# Patient Record
Sex: MALE | Race: WHITE | NOT HISPANIC OR LATINO | Employment: OTHER | URBAN - NONMETROPOLITAN AREA
[De-identification: names, ages, dates, MRNs, and addresses within clinical notes are randomized per-mention and may not be internally consistent; named-entity substitution may affect disease eponyms.]

---

## 2018-01-19 ENCOUNTER — HOSPITAL ENCOUNTER (INPATIENT)
Facility: HOSPITAL | Age: 53
LOS: 14 days | Discharge: HOME | End: 2018-02-02
Attending: PSYCHIATRY & NEUROLOGY | Admitting: PSYCHIATRY & NEUROLOGY

## 2018-01-19 ENCOUNTER — HOSPITAL ENCOUNTER (EMERGENCY)
Facility: HOSPITAL | Age: 53
Discharge: PSYCHIATRIC OR SUBSTANCE USE FACILITY | End: 2018-01-19
Attending: EMERGENCY MEDICINE | Admitting: EMERGENCY MEDICINE

## 2018-01-19 VITALS
WEIGHT: 200 LBS | OXYGEN SATURATION: 96 % | HEIGHT: 73 IN | TEMPERATURE: 98 F | SYSTOLIC BLOOD PRESSURE: 161 MMHG | RESPIRATION RATE: 18 BRPM | DIASTOLIC BLOOD PRESSURE: 87 MMHG | BODY MASS INDEX: 26.51 KG/M2 | HEART RATE: 97 BPM

## 2018-01-19 DIAGNOSIS — R45.851 SUICIDAL THOUGHTS: ICD-10-CM

## 2018-01-19 DIAGNOSIS — E86.0 DEHYDRATION: ICD-10-CM

## 2018-01-19 DIAGNOSIS — R44.3 HALLUCINATIONS: Primary | ICD-10-CM

## 2018-01-19 PROBLEM — F39 MOOD DISORDER (HCC): Status: ACTIVE | Noted: 2018-01-19

## 2018-01-19 PROBLEM — F29 PSYCHOTIC DISORDER (HCC): Status: ACTIVE | Noted: 2018-01-19

## 2018-01-19 PROBLEM — F02.80 DEMENTIA IN ALZHEIMER'S DISEASE WITH EARLY ONSET (HCC): Status: ACTIVE | Noted: 2018-01-19

## 2018-01-19 PROBLEM — G30.0 DEMENTIA IN ALZHEIMER'S DISEASE WITH EARLY ONSET (HCC): Status: ACTIVE | Noted: 2018-01-19

## 2018-01-19 LAB
ALBUMIN SERPL-MCNC: 4.1 G/DL (ref 3.4–4.8)
ALBUMIN/GLOB SERPL: 1.4 G/DL (ref 1.1–1.8)
ALP SERPL-CCNC: 85 U/L (ref 38–126)
ALT SERPL W P-5'-P-CCNC: 52 U/L (ref 21–72)
AMPHET+METHAMPHET UR QL: POSITIVE
ANION GAP SERPL CALCULATED.3IONS-SCNC: 11 MMOL/L (ref 5–15)
APAP SERPL-MCNC: <10 MCG/ML (ref 10–30)
AST SERPL-CCNC: 44 U/L (ref 17–59)
BARBITURATES UR QL SCN: NEGATIVE
BASOPHILS # BLD AUTO: 0 10*3/MM3 (ref 0–0.2)
BASOPHILS NFR BLD AUTO: 0 % (ref 0–2)
BENZODIAZ UR QL SCN: POSITIVE
BILIRUB SERPL-MCNC: 0.6 MG/DL (ref 0.2–1.3)
BILIRUB UR QL STRIP: ABNORMAL
BUN BLD-MCNC: 15 MG/DL (ref 7–21)
BUN/CREAT SERPL: 20.5 (ref 7–25)
CALCIUM SPEC-SCNC: 10.5 MG/DL (ref 8.4–10.2)
CANNABINOIDS SERPL QL: NEGATIVE
CHLORIDE SERPL-SCNC: 102 MMOL/L (ref 95–110)
CLARITY UR: CLEAR
CO2 SERPL-SCNC: 23 MMOL/L (ref 22–31)
COCAINE UR QL: NEGATIVE
COLOR UR: YELLOW
CREAT BLD-MCNC: 0.73 MG/DL (ref 0.7–1.3)
DEPRECATED RDW RBC AUTO: 39.7 FL (ref 35.1–43.9)
EOSINOPHIL # BLD AUTO: 0.03 10*3/MM3 (ref 0–0.7)
EOSINOPHIL NFR BLD AUTO: 0.4 % (ref 0–7)
ERYTHROCYTE [DISTWIDTH] IN BLOOD BY AUTOMATED COUNT: 12.6 % (ref 11.5–14.5)
ETHANOL BLD-MCNC: <10 MG/DL (ref 0–10)
ETHANOL UR QL: <0.01 %
GFR SERPL CREATININE-BSD FRML MDRD: 113 ML/MIN/1.73 (ref 56–130)
GLOBULIN UR ELPH-MCNC: 2.9 GM/DL (ref 2.3–3.5)
GLUCOSE BLD-MCNC: 104 MG/DL (ref 60–100)
GLUCOSE BLDC GLUCOMTR-MCNC: 100 MG/DL (ref 70–130)
GLUCOSE UR STRIP-MCNC: NEGATIVE MG/DL
HCT VFR BLD AUTO: 47.8 % (ref 39–49)
HGB BLD-MCNC: 17.3 G/DL (ref 13.7–17.3)
HGB UR QL STRIP.AUTO: NEGATIVE
HOLD SPECIMEN: NORMAL
HOLD SPECIMEN: NORMAL
IMM GRANULOCYTES # BLD: 0.02 10*3/MM3 (ref 0–0.02)
IMM GRANULOCYTES NFR BLD: 0.3 % (ref 0–0.5)
KETONES UR QL STRIP: ABNORMAL
LEUKOCYTE ESTERASE UR QL STRIP.AUTO: NEGATIVE
LYMPHOCYTES # BLD AUTO: 1.1 10*3/MM3 (ref 0.6–4.2)
LYMPHOCYTES NFR BLD AUTO: 13.9 % (ref 10–50)
MCH RBC QN AUTO: 32.1 PG (ref 26.5–34)
MCHC RBC AUTO-ENTMCNC: 36.2 G/DL (ref 31.5–36.3)
MCV RBC AUTO: 88.7 FL (ref 80–98)
METHADONE UR QL SCN: NEGATIVE
MONOCYTES # BLD AUTO: 0.45 10*3/MM3 (ref 0–0.9)
MONOCYTES NFR BLD AUTO: 5.7 % (ref 0–12)
NEUTROPHILS # BLD AUTO: 6.31 10*3/MM3 (ref 2–8.6)
NEUTROPHILS NFR BLD AUTO: 79.7 % (ref 37–80)
NITRITE UR QL STRIP: NEGATIVE
OPIATES UR QL: NEGATIVE
OXYCODONE UR QL SCN: NEGATIVE
PH UR STRIP.AUTO: 6 [PH] (ref 5–9)
PLATELET # BLD AUTO: 149 10*3/MM3 (ref 150–450)
PMV BLD AUTO: 10 FL (ref 8–12)
POTASSIUM BLD-SCNC: 3.3 MMOL/L (ref 3.5–5.1)
PROT SERPL-MCNC: 7 G/DL (ref 6.3–8.6)
PROT UR QL STRIP: ABNORMAL
RBC # BLD AUTO: 5.39 10*6/MM3 (ref 4.37–5.74)
SALICYLATES SERPL-MCNC: <1 MG/DL (ref 10–20)
SODIUM BLD-SCNC: 136 MMOL/L (ref 137–145)
SP GR UR STRIP: 1.02 (ref 1–1.03)
TSH SERPL DL<=0.05 MIU/L-ACNC: 0.66 MIU/ML (ref 0.46–4.68)
UROBILINOGEN UR QL STRIP: ABNORMAL
WBC NRBC COR # BLD: 7.91 10*3/MM3 (ref 3.2–9.8)
WHOLE BLOOD HOLD SPECIMEN: NORMAL
WHOLE BLOOD HOLD SPECIMEN: NORMAL

## 2018-01-19 PROCEDURE — 80053 COMPREHEN METABOLIC PANEL: CPT | Performed by: PHYSICIAN ASSISTANT

## 2018-01-19 PROCEDURE — 80307 DRUG TEST PRSMV CHEM ANLYZR: CPT | Performed by: PHYSICIAN ASSISTANT

## 2018-01-19 PROCEDURE — 96360 HYDRATION IV INFUSION INIT: CPT

## 2018-01-19 PROCEDURE — 81003 URINALYSIS AUTO W/O SCOPE: CPT | Performed by: PHYSICIAN ASSISTANT

## 2018-01-19 PROCEDURE — 93005 ELECTROCARDIOGRAM TRACING: CPT | Performed by: PHYSICIAN ASSISTANT

## 2018-01-19 PROCEDURE — 85025 COMPLETE CBC W/AUTO DIFF WBC: CPT | Performed by: PHYSICIAN ASSISTANT

## 2018-01-19 PROCEDURE — 82962 GLUCOSE BLOOD TEST: CPT

## 2018-01-19 PROCEDURE — 90791 PSYCH DIAGNOSTIC EVALUATION: CPT | Performed by: PSYCHIATRY & NEUROLOGY

## 2018-01-19 PROCEDURE — 84443 ASSAY THYROID STIM HORMONE: CPT | Performed by: PHYSICIAN ASSISTANT

## 2018-01-19 PROCEDURE — 93010 ELECTROCARDIOGRAM REPORT: CPT | Performed by: INTERNAL MEDICINE

## 2018-01-19 PROCEDURE — 99284 EMERGENCY DEPT VISIT MOD MDM: CPT

## 2018-01-19 RX ORDER — ATORVASTATIN CALCIUM 20 MG/1
20 TABLET, FILM COATED ORAL NIGHTLY
Status: ON HOLD | COMMUNITY
End: 2018-03-08

## 2018-01-19 RX ORDER — OLANZAPINE 5 MG/1
5 TABLET, ORALLY DISINTEGRATING ORAL 2 TIMES DAILY PRN
Status: DISCONTINUED | OUTPATIENT
Start: 2018-01-19 | End: 2018-01-29

## 2018-01-19 RX ORDER — MECLIZINE HCL 12.5 MG/1
12.5 TABLET ORAL 2 TIMES DAILY PRN
COMMUNITY
End: 2018-03-08 | Stop reason: HOSPADM

## 2018-01-19 RX ORDER — LOSARTAN POTASSIUM AND HYDROCHLOROTHIAZIDE 25; 100 MG/1; MG/1
1 TABLET ORAL DAILY
COMMUNITY
End: 2018-02-02 | Stop reason: HOSPADM

## 2018-01-19 RX ORDER — LORAZEPAM 1 MG/1
1 TABLET ORAL 3 TIMES DAILY
Status: COMPLETED | OUTPATIENT
Start: 2018-01-19 | End: 2018-01-20

## 2018-01-19 RX ORDER — HYDROXYZINE PAMOATE 50 MG/1
50 CAPSULE ORAL EVERY 6 HOURS PRN
Status: DISCONTINUED | OUTPATIENT
Start: 2018-01-19 | End: 2018-01-22

## 2018-01-19 RX ORDER — DONEPEZIL HYDROCHLORIDE 10 MG/1
10 TABLET, FILM COATED ORAL NIGHTLY
Status: ON HOLD | COMMUNITY
End: 2018-03-08

## 2018-01-19 RX ORDER — ACETAMINOPHEN 325 MG/1
650 TABLET ORAL EVERY 4 HOURS PRN
Status: DISCONTINUED | OUTPATIENT
Start: 2018-01-19 | End: 2018-02-02 | Stop reason: HOSPADM

## 2018-01-19 RX ORDER — ASPIRIN 81 MG/1
81 TABLET, CHEWABLE ORAL DAILY
Status: DISCONTINUED | OUTPATIENT
Start: 2018-01-19 | End: 2018-02-02 | Stop reason: HOSPADM

## 2018-01-19 RX ORDER — ASPIRIN 81 MG/1
81 TABLET, CHEWABLE ORAL DAILY
Status: ON HOLD | COMMUNITY
End: 2018-03-08

## 2018-01-19 RX ORDER — RISPERIDONE 1 MG/1
3 TABLET ORAL NIGHTLY
Status: DISCONTINUED | OUTPATIENT
Start: 2018-01-19 | End: 2018-01-20

## 2018-01-19 RX ORDER — LORAZEPAM 0.5 MG/1
0.5 TABLET ORAL 3 TIMES DAILY
Status: COMPLETED | OUTPATIENT
Start: 2018-01-20 | End: 2018-01-21

## 2018-01-19 RX ORDER — CLOPIDOGREL BISULFATE 75 MG/1
75 TABLET ORAL DAILY
Status: DISCONTINUED | OUTPATIENT
Start: 2018-01-19 | End: 2018-02-02 | Stop reason: HOSPADM

## 2018-01-19 RX ORDER — LORAZEPAM 0.5 MG/1
0.5 TABLET ORAL DAILY
Status: COMPLETED | OUTPATIENT
Start: 2018-01-23 | End: 2018-01-23

## 2018-01-19 RX ORDER — METOPROLOL SUCCINATE 50 MG/1
50 TABLET, EXTENDED RELEASE ORAL DAILY
Status: DISCONTINUED | OUTPATIENT
Start: 2018-01-19 | End: 2018-02-02 | Stop reason: HOSPADM

## 2018-01-19 RX ORDER — ALPRAZOLAM 2 MG/1
2 TABLET ORAL 3 TIMES DAILY PRN
Status: ON HOLD | COMMUNITY
End: 2018-01-26

## 2018-01-19 RX ORDER — CLOPIDOGREL BISULFATE 75 MG/1
75 TABLET ORAL DAILY
Status: ON HOLD | COMMUNITY
End: 2018-03-08

## 2018-01-19 RX ORDER — PREDNISONE 20 MG/1
20 TABLET ORAL DAILY
Status: ON HOLD | COMMUNITY
End: 2018-01-26

## 2018-01-19 RX ORDER — POTASSIUM CHLORIDE 1.5 G/1.77G
20 POWDER, FOR SOLUTION ORAL 2 TIMES DAILY WITH MEALS
COMMUNITY
End: 2018-02-02 | Stop reason: HOSPADM

## 2018-01-19 RX ORDER — TRAZODONE HYDROCHLORIDE 50 MG/1
50 TABLET ORAL NIGHTLY PRN
Status: DISCONTINUED | OUTPATIENT
Start: 2018-01-19 | End: 2018-01-20

## 2018-01-19 RX ORDER — TERAZOSIN 2 MG/1
2 CAPSULE ORAL NIGHTLY
Status: DISCONTINUED | OUTPATIENT
Start: 2018-01-19 | End: 2018-02-02 | Stop reason: HOSPADM

## 2018-01-19 RX ORDER — HYDROCHLOROTHIAZIDE 25 MG/1
25 TABLET ORAL DAILY
Status: DISCONTINUED | OUTPATIENT
Start: 2018-01-19 | End: 2018-02-02 | Stop reason: HOSPADM

## 2018-01-19 RX ORDER — PANTOPRAZOLE SODIUM 40 MG/1
40 TABLET, DELAYED RELEASE ORAL DAILY
Status: DISCONTINUED | OUTPATIENT
Start: 2018-01-19 | End: 2018-02-02 | Stop reason: HOSPADM

## 2018-01-19 RX ORDER — ONDANSETRON 4 MG/1
4 TABLET, FILM COATED ORAL EVERY 6 HOURS PRN
Status: DISCONTINUED | OUTPATIENT
Start: 2018-01-19 | End: 2018-02-02 | Stop reason: HOSPADM

## 2018-01-19 RX ORDER — LORAZEPAM 0.5 MG/1
0.5 TABLET ORAL 2 TIMES DAILY
Status: COMPLETED | OUTPATIENT
Start: 2018-01-21 | End: 2018-01-22

## 2018-01-19 RX ORDER — POTASSIUM CHLORIDE 1.5 G/1.77G
20 POWDER, FOR SOLUTION ORAL 2 TIMES DAILY WITH MEALS
Status: DISCONTINUED | OUTPATIENT
Start: 2018-01-19 | End: 2018-01-20

## 2018-01-19 RX ORDER — HYDROCODONE BITARTRATE AND ACETAMINOPHEN 7.5; 325 MG/1; MG/1
1 TABLET ORAL 3 TIMES DAILY PRN
Status: ON HOLD | COMMUNITY
End: 2018-01-19

## 2018-01-19 RX ORDER — ESCITALOPRAM OXALATE 10 MG/1
20 TABLET ORAL DAILY
Status: DISCONTINUED | OUTPATIENT
Start: 2018-01-19 | End: 2018-01-24

## 2018-01-19 RX ORDER — DOXAZOSIN 2 MG/1
2 TABLET ORAL NIGHTLY
COMMUNITY
End: 2018-03-08 | Stop reason: HOSPADM

## 2018-01-19 RX ORDER — DONEPEZIL HYDROCHLORIDE 10 MG/1
10 TABLET, FILM COATED ORAL NIGHTLY
Status: DISCONTINUED | OUTPATIENT
Start: 2018-01-19 | End: 2018-02-02 | Stop reason: HOSPADM

## 2018-01-19 RX ORDER — SODIUM CHLORIDE 0.9 % (FLUSH) 0.9 %
10 SYRINGE (ML) INJECTION AS NEEDED
Status: DISCONTINUED | OUTPATIENT
Start: 2018-01-19 | End: 2018-01-19 | Stop reason: HOSPADM

## 2018-01-19 RX ORDER — TEMAZEPAM 30 MG/1
30 CAPSULE ORAL NIGHTLY
Status: ON HOLD | COMMUNITY
End: 2018-01-26

## 2018-01-19 RX ORDER — MECLIZINE HCL 12.5 MG/1
12.5 TABLET ORAL 2 TIMES DAILY PRN
Status: DISCONTINUED | OUTPATIENT
Start: 2018-01-19 | End: 2018-02-02 | Stop reason: HOSPADM

## 2018-01-19 RX ORDER — METOPROLOL SUCCINATE 50 MG/1
50 TABLET, EXTENDED RELEASE ORAL DAILY
Status: ON HOLD | COMMUNITY
End: 2018-03-08

## 2018-01-19 RX ORDER — CLONIDINE HYDROCHLORIDE 0.1 MG/1
0.1 TABLET ORAL EVERY 4 HOURS PRN
Status: DISCONTINUED | OUTPATIENT
Start: 2018-01-19 | End: 2018-02-02 | Stop reason: HOSPADM

## 2018-01-19 RX ORDER — PANTOPRAZOLE SODIUM 40 MG/1
40 TABLET, DELAYED RELEASE ORAL DAILY
Status: ON HOLD | COMMUNITY
End: 2018-03-08

## 2018-01-19 RX ORDER — CITALOPRAM 40 MG/1
40 TABLET ORAL DAILY
Status: ON HOLD | COMMUNITY
End: 2018-01-26

## 2018-01-19 RX ORDER — LOPERAMIDE HYDROCHLORIDE 2 MG/1
2 CAPSULE ORAL 4 TIMES DAILY PRN
Status: DISCONTINUED | OUTPATIENT
Start: 2018-01-19 | End: 2018-02-02 | Stop reason: HOSPADM

## 2018-01-19 RX ORDER — LOSARTAN POTASSIUM 50 MG/1
100 TABLET ORAL
Status: DISCONTINUED | OUTPATIENT
Start: 2018-01-19 | End: 2018-01-22

## 2018-01-19 RX ORDER — ATORVASTATIN CALCIUM 20 MG/1
20 TABLET, FILM COATED ORAL NIGHTLY
Status: DISCONTINUED | OUTPATIENT
Start: 2018-01-19 | End: 2018-02-02 | Stop reason: HOSPADM

## 2018-01-19 RX ORDER — DOCUSATE SODIUM 100 MG/1
100 CAPSULE, LIQUID FILLED ORAL 2 TIMES DAILY PRN
Status: DISCONTINUED | OUTPATIENT
Start: 2018-01-19 | End: 2018-02-02 | Stop reason: HOSPADM

## 2018-01-19 RX ADMIN — RISPERIDONE 3 MG: 1 TABLET ORAL at 20:53

## 2018-01-19 RX ADMIN — ATORVASTATIN CALCIUM 20 MG: 20 TABLET, FILM COATED ORAL at 20:53

## 2018-01-19 RX ADMIN — CLOPIDOGREL BISULFATE 75 MG: 75 TABLET ORAL at 17:21

## 2018-01-19 RX ADMIN — POTASSIUM CHLORIDE 20 MEQ: 1.5 POWDER, FOR SOLUTION ORAL at 17:39

## 2018-01-19 RX ADMIN — DONEPEZIL HYDROCHLORIDE 10 MG: 10 TABLET, FILM COATED ORAL at 20:53

## 2018-01-19 RX ADMIN — PANTOPRAZOLE SODIUM 40 MG: 40 TABLET, DELAYED RELEASE ORAL at 17:39

## 2018-01-19 RX ADMIN — ASPIRIN 81 MG 81 MG: 81 TABLET ORAL at 17:21

## 2018-01-19 RX ADMIN — Medication 10 ML: at 12:02

## 2018-01-19 RX ADMIN — SODIUM CHLORIDE 1000 ML: 900 INJECTION, SOLUTION INTRAVENOUS at 12:04

## 2018-01-19 RX ADMIN — LORAZEPAM 1 MG: 1 TABLET ORAL at 20:53

## 2018-01-19 RX ADMIN — METOPROLOL SUCCINATE 50 MG: 50 TABLET, EXTENDED RELEASE ORAL at 17:40

## 2018-01-20 PROCEDURE — 99232 SBSQ HOSP IP/OBS MODERATE 35: CPT | Performed by: PSYCHIATRY & NEUROLOGY

## 2018-01-20 PROCEDURE — 99232 SBSQ HOSP IP/OBS MODERATE 35: CPT | Performed by: FAMILY MEDICINE

## 2018-01-20 RX ORDER — QUETIAPINE FUMARATE 300 MG/1
300 TABLET, FILM COATED ORAL NIGHTLY
Status: DISCONTINUED | OUTPATIENT
Start: 2018-01-20 | End: 2018-01-21

## 2018-01-20 RX ORDER — RISPERIDONE 1 MG/1
3 TABLET ORAL ONCE
Status: COMPLETED | OUTPATIENT
Start: 2018-01-20 | End: 2018-01-20

## 2018-01-20 RX ORDER — POTASSIUM CHLORIDE 750 MG/1
30 CAPSULE, EXTENDED RELEASE ORAL
Status: COMPLETED | OUTPATIENT
Start: 2018-01-20 | End: 2018-01-22

## 2018-01-20 RX ADMIN — ONDANSETRON 4 MG: 4 TABLET, FILM COATED ORAL at 08:28

## 2018-01-20 RX ADMIN — ATORVASTATIN CALCIUM 20 MG: 20 TABLET, FILM COATED ORAL at 20:21

## 2018-01-20 RX ADMIN — POTASSIUM CHLORIDE 30 MEQ: 750 CAPSULE, EXTENDED RELEASE ORAL at 18:11

## 2018-01-20 RX ADMIN — ESCITALOPRAM OXALATE 20 MG: 10 TABLET ORAL at 09:19

## 2018-01-20 RX ADMIN — RISPERIDONE 3 MG: 1 TABLET ORAL at 16:41

## 2018-01-20 RX ADMIN — ASPIRIN 81 MG 81 MG: 81 TABLET ORAL at 09:19

## 2018-01-20 RX ADMIN — PANTOPRAZOLE SODIUM 40 MG: 40 TABLET, DELAYED RELEASE ORAL at 09:20

## 2018-01-20 RX ADMIN — TERAZOSIN HYDROCHLORIDE 2 MG: 1 CAPSULE ORAL at 20:21

## 2018-01-20 RX ADMIN — LOSARTAN POTASSIUM 100 MG: 50 TABLET, FILM COATED ORAL at 09:19

## 2018-01-20 RX ADMIN — CLOPIDOGREL BISULFATE 75 MG: 75 TABLET ORAL at 09:19

## 2018-01-20 RX ADMIN — POTASSIUM CHLORIDE 30 MEQ: 750 CAPSULE, EXTENDED RELEASE ORAL at 09:00

## 2018-01-20 RX ADMIN — LORAZEPAM 1 MG: 1 TABLET ORAL at 16:38

## 2018-01-20 RX ADMIN — QUETIAPINE FUMARATE 300 MG: 300 TABLET ORAL at 20:21

## 2018-01-20 RX ADMIN — POTASSIUM CHLORIDE 30 MEQ: 750 CAPSULE, EXTENDED RELEASE ORAL at 12:43

## 2018-01-20 RX ADMIN — METOPROLOL SUCCINATE 50 MG: 50 TABLET, EXTENDED RELEASE ORAL at 09:18

## 2018-01-20 RX ADMIN — LORAZEPAM 0.5 MG: 0.5 TABLET ORAL at 20:23

## 2018-01-20 RX ADMIN — LORAZEPAM 1 MG: 1 TABLET ORAL at 09:20

## 2018-01-20 RX ADMIN — DONEPEZIL HYDROCHLORIDE 10 MG: 10 TABLET, FILM COATED ORAL at 20:21

## 2018-01-20 RX ADMIN — ONDANSETRON 4 MG: 4 TABLET, FILM COATED ORAL at 15:12

## 2018-01-20 RX ADMIN — HYDROCHLOROTHIAZIDE 25 MG: 25 TABLET ORAL at 09:19

## 2018-01-21 PROCEDURE — 99232 SBSQ HOSP IP/OBS MODERATE 35: CPT | Performed by: PSYCHIATRY & NEUROLOGY

## 2018-01-21 PROCEDURE — 99232 SBSQ HOSP IP/OBS MODERATE 35: CPT | Performed by: FAMILY MEDICINE

## 2018-01-21 RX ORDER — QUETIAPINE FUMARATE 300 MG/1
600 TABLET, FILM COATED ORAL NIGHTLY
Status: DISCONTINUED | OUTPATIENT
Start: 2018-01-21 | End: 2018-01-22

## 2018-01-21 RX ADMIN — CLOPIDOGREL BISULFATE 75 MG: 75 TABLET ORAL at 08:17

## 2018-01-21 RX ADMIN — QUETIAPINE FUMARATE 600 MG: 300 TABLET ORAL at 22:18

## 2018-01-21 RX ADMIN — ATORVASTATIN CALCIUM 20 MG: 20 TABLET, FILM COATED ORAL at 22:17

## 2018-01-21 RX ADMIN — POTASSIUM CHLORIDE 30 MEQ: 750 CAPSULE, EXTENDED RELEASE ORAL at 08:17

## 2018-01-21 RX ADMIN — ESCITALOPRAM OXALATE 20 MG: 10 TABLET ORAL at 08:17

## 2018-01-21 RX ADMIN — LORAZEPAM 0.5 MG: 0.5 TABLET ORAL at 08:17

## 2018-01-21 RX ADMIN — POTASSIUM CHLORIDE 30 MEQ: 750 CAPSULE, EXTENDED RELEASE ORAL at 11:34

## 2018-01-21 RX ADMIN — DONEPEZIL HYDROCHLORIDE 10 MG: 10 TABLET, FILM COATED ORAL at 22:18

## 2018-01-21 RX ADMIN — POTASSIUM CHLORIDE 30 MEQ: 750 CAPSULE, EXTENDED RELEASE ORAL at 17:07

## 2018-01-21 RX ADMIN — ASPIRIN 81 MG 81 MG: 81 TABLET ORAL at 08:17

## 2018-01-21 RX ADMIN — PANTOPRAZOLE SODIUM 40 MG: 40 TABLET, DELAYED RELEASE ORAL at 08:17

## 2018-01-21 RX ADMIN — LORAZEPAM 0.5 MG: 0.5 TABLET ORAL at 16:44

## 2018-01-21 RX ADMIN — LORAZEPAM 0.5 MG: 0.5 TABLET ORAL at 22:18

## 2018-01-21 RX ADMIN — TERAZOSIN HYDROCHLORIDE 2 MG: 1 CAPSULE ORAL at 22:18

## 2018-01-22 PROCEDURE — 99232 SBSQ HOSP IP/OBS MODERATE 35: CPT | Performed by: PSYCHIATRY & NEUROLOGY

## 2018-01-22 RX ORDER — DIAPER,BRIEF,INFANT-TODD,DISP
EACH MISCELLANEOUS 4 TIMES DAILY PRN
Status: DISCONTINUED | OUTPATIENT
Start: 2018-01-22 | End: 2018-02-02 | Stop reason: HOSPADM

## 2018-01-22 RX ORDER — LURASIDONE HYDROCHLORIDE 40 MG/1
60 TABLET, FILM COATED ORAL
Status: DISCONTINUED | OUTPATIENT
Start: 2018-01-22 | End: 2018-01-23

## 2018-01-22 RX ORDER — TRAZODONE HYDROCHLORIDE 100 MG/1
100 TABLET ORAL NIGHTLY PRN
Status: DISCONTINUED | OUTPATIENT
Start: 2018-01-22 | End: 2018-01-27

## 2018-01-22 RX ORDER — HYDROXYZINE PAMOATE 25 MG/1
25 CAPSULE ORAL 4 TIMES DAILY PRN
Status: DISCONTINUED | OUTPATIENT
Start: 2018-01-22 | End: 2018-01-30

## 2018-01-22 RX ADMIN — METOPROLOL SUCCINATE 50 MG: 50 TABLET, EXTENDED RELEASE ORAL at 08:12

## 2018-01-22 RX ADMIN — TERAZOSIN HYDROCHLORIDE 2 MG: 1 CAPSULE ORAL at 21:02

## 2018-01-22 RX ADMIN — POTASSIUM CHLORIDE 30 MEQ: 750 CAPSULE, EXTENDED RELEASE ORAL at 08:11

## 2018-01-22 RX ADMIN — LOSARTAN POTASSIUM 100 MG: 50 TABLET, FILM COATED ORAL at 08:11

## 2018-01-22 RX ADMIN — CLOPIDOGREL BISULFATE 75 MG: 75 TABLET ORAL at 08:12

## 2018-01-22 RX ADMIN — HYDROCORTISONE: 1 CREAM TOPICAL at 11:36

## 2018-01-22 RX ADMIN — POTASSIUM CHLORIDE 30 MEQ: 750 CAPSULE, EXTENDED RELEASE ORAL at 11:48

## 2018-01-22 RX ADMIN — TRAZODONE HYDROCHLORIDE 100 MG: 100 TABLET ORAL at 21:06

## 2018-01-22 RX ADMIN — ASPIRIN 81 MG 81 MG: 81 TABLET ORAL at 08:11

## 2018-01-22 RX ADMIN — ATORVASTATIN CALCIUM 20 MG: 20 TABLET, FILM COATED ORAL at 21:02

## 2018-01-22 RX ADMIN — POTASSIUM CHLORIDE 30 MEQ: 750 CAPSULE, EXTENDED RELEASE ORAL at 18:32

## 2018-01-22 RX ADMIN — HYDROXYZINE PAMOATE 25 MG: 25 CAPSULE ORAL at 18:32

## 2018-01-22 RX ADMIN — HYDROXYZINE PAMOATE 25 MG: 25 CAPSULE ORAL at 11:36

## 2018-01-22 RX ADMIN — HYDROCHLOROTHIAZIDE 25 MG: 25 TABLET ORAL at 08:12

## 2018-01-22 RX ADMIN — ESCITALOPRAM OXALATE 20 MG: 10 TABLET ORAL at 08:11

## 2018-01-22 RX ADMIN — LORAZEPAM 0.5 MG: 0.5 TABLET ORAL at 08:11

## 2018-01-22 RX ADMIN — LURASIDONE HYDROCHLORIDE 60 MG: 40 TABLET, FILM COATED ORAL at 18:31

## 2018-01-22 RX ADMIN — DONEPEZIL HYDROCHLORIDE 10 MG: 10 TABLET, FILM COATED ORAL at 21:02

## 2018-01-22 RX ADMIN — PANTOPRAZOLE SODIUM 40 MG: 40 TABLET, DELAYED RELEASE ORAL at 08:12

## 2018-01-23 PROCEDURE — G0480 DRUG TEST DEF 1-7 CLASSES: HCPCS | Performed by: PSYCHIATRY & NEUROLOGY

## 2018-01-23 PROCEDURE — 99232 SBSQ HOSP IP/OBS MODERATE 35: CPT | Performed by: PSYCHIATRY & NEUROLOGY

## 2018-01-23 RX ORDER — LURASIDONE HYDROCHLORIDE 40 MG/1
80 TABLET, FILM COATED ORAL
Status: DISCONTINUED | OUTPATIENT
Start: 2018-01-23 | End: 2018-01-24

## 2018-01-23 RX ADMIN — PANTOPRAZOLE SODIUM 40 MG: 40 TABLET, DELAYED RELEASE ORAL at 08:30

## 2018-01-23 RX ADMIN — TERAZOSIN HYDROCHLORIDE 2 MG: 1 CAPSULE ORAL at 20:58

## 2018-01-23 RX ADMIN — LURASIDONE HYDROCHLORIDE 80 MG: 40 TABLET, FILM COATED ORAL at 17:14

## 2018-01-23 RX ADMIN — DONEPEZIL HYDROCHLORIDE 10 MG: 10 TABLET, FILM COATED ORAL at 20:58

## 2018-01-23 RX ADMIN — HYDROCHLOROTHIAZIDE 25 MG: 25 TABLET ORAL at 08:30

## 2018-01-23 RX ADMIN — HYDROCORTISONE: 1 CREAM TOPICAL at 08:30

## 2018-01-23 RX ADMIN — ATORVASTATIN CALCIUM 20 MG: 20 TABLET, FILM COATED ORAL at 20:58

## 2018-01-23 RX ADMIN — LORAZEPAM 0.5 MG: 0.5 TABLET ORAL at 10:04

## 2018-01-23 RX ADMIN — TRAZODONE HYDROCHLORIDE 100 MG: 100 TABLET ORAL at 20:58

## 2018-01-23 RX ADMIN — ASPIRIN 81 MG 81 MG: 81 TABLET ORAL at 08:30

## 2018-01-23 RX ADMIN — METOPROLOL SUCCINATE 50 MG: 50 TABLET, EXTENDED RELEASE ORAL at 08:30

## 2018-01-23 RX ADMIN — CLOPIDOGREL BISULFATE 75 MG: 75 TABLET ORAL at 08:30

## 2018-01-23 RX ADMIN — ESCITALOPRAM OXALATE 20 MG: 10 TABLET ORAL at 08:30

## 2018-01-24 LAB
ARTICHOKE IGE QN: 62 MG/DL (ref 1–129)
CHOLEST SERPL-MCNC: 121 MG/DL (ref 0–199)
GLUCOSE P FAST SERPL-MCNC: 101 MG/DL (ref 60–110)
HDLC SERPL-MCNC: 42 MG/DL (ref 60–200)
LDLC/HDLC SERPL: 1.36 {RATIO} (ref 0–3.55)
TRIGL SERPL-MCNC: 109 MG/DL (ref 20–199)
WHOLE BLOOD HOLD SPECIMEN: NORMAL

## 2018-01-24 PROCEDURE — 80061 LIPID PANEL: CPT | Performed by: PSYCHIATRY & NEUROLOGY

## 2018-01-24 PROCEDURE — 99232 SBSQ HOSP IP/OBS MODERATE 35: CPT | Performed by: PSYCHIATRY & NEUROLOGY

## 2018-01-24 PROCEDURE — 82947 ASSAY GLUCOSE BLOOD QUANT: CPT | Performed by: PSYCHIATRY & NEUROLOGY

## 2018-01-24 RX ORDER — DULOXETIN HYDROCHLORIDE 60 MG/1
60 CAPSULE, DELAYED RELEASE ORAL DAILY
Status: DISCONTINUED | OUTPATIENT
Start: 2018-01-25 | End: 2018-02-02 | Stop reason: HOSPADM

## 2018-01-24 RX ORDER — DULOXETIN HYDROCHLORIDE 30 MG/1
30 CAPSULE, DELAYED RELEASE ORAL DAILY
Status: COMPLETED | OUTPATIENT
Start: 2018-01-24 | End: 2018-01-24

## 2018-01-24 RX ORDER — ESCITALOPRAM OXALATE 10 MG/1
10 TABLET ORAL DAILY
Status: COMPLETED | OUTPATIENT
Start: 2018-01-25 | End: 2018-01-25

## 2018-01-24 RX ORDER — LURASIDONE HYDROCHLORIDE 40 MG/1
120 TABLET, FILM COATED ORAL
Status: DISCONTINUED | OUTPATIENT
Start: 2018-01-24 | End: 2018-01-25

## 2018-01-24 RX ADMIN — ASPIRIN 81 MG 81 MG: 81 TABLET ORAL at 08:15

## 2018-01-24 RX ADMIN — TRAZODONE HYDROCHLORIDE 100 MG: 100 TABLET ORAL at 20:00

## 2018-01-24 RX ADMIN — PANTOPRAZOLE SODIUM 40 MG: 40 TABLET, DELAYED RELEASE ORAL at 08:14

## 2018-01-24 RX ADMIN — HYDROCHLOROTHIAZIDE 25 MG: 25 TABLET ORAL at 08:14

## 2018-01-24 RX ADMIN — CLOPIDOGREL BISULFATE 75 MG: 75 TABLET ORAL at 08:14

## 2018-01-24 RX ADMIN — DULOXETINE HYDROCHLORIDE 30 MG: 30 CAPSULE, DELAYED RELEASE ORAL at 15:06

## 2018-01-24 RX ADMIN — LURASIDONE HYDROCHLORIDE 120 MG: 40 TABLET, FILM COATED ORAL at 17:27

## 2018-01-24 RX ADMIN — TERAZOSIN HYDROCHLORIDE 2 MG: 1 CAPSULE ORAL at 20:00

## 2018-01-24 RX ADMIN — DONEPEZIL HYDROCHLORIDE 10 MG: 10 TABLET, FILM COATED ORAL at 20:00

## 2018-01-24 RX ADMIN — METOPROLOL SUCCINATE 50 MG: 50 TABLET, EXTENDED RELEASE ORAL at 08:15

## 2018-01-24 RX ADMIN — ESCITALOPRAM OXALATE 20 MG: 10 TABLET ORAL at 08:14

## 2018-01-24 RX ADMIN — ATORVASTATIN CALCIUM 20 MG: 20 TABLET, FILM COATED ORAL at 20:00

## 2018-01-25 PROCEDURE — 99232 SBSQ HOSP IP/OBS MODERATE 35: CPT | Performed by: PSYCHIATRY & NEUROLOGY

## 2018-01-25 RX ORDER — RISPERIDONE 1 MG/1
2 TABLET ORAL EVERY 12 HOURS SCHEDULED
Status: DISCONTINUED | OUTPATIENT
Start: 2018-01-25 | End: 2018-01-26

## 2018-01-25 RX ORDER — LITHIUM CARBONATE 300 MG
300 TABLET ORAL 2 TIMES DAILY WITH MEALS
Status: DISCONTINUED | OUTPATIENT
Start: 2018-01-25 | End: 2018-01-27

## 2018-01-25 RX ADMIN — HYDROXYZINE PAMOATE 25 MG: 25 CAPSULE ORAL at 17:27

## 2018-01-25 RX ADMIN — ATORVASTATIN CALCIUM 20 MG: 20 TABLET, FILM COATED ORAL at 21:25

## 2018-01-25 RX ADMIN — ASPIRIN 81 MG 81 MG: 81 TABLET ORAL at 08:33

## 2018-01-25 RX ADMIN — DONEPEZIL HYDROCHLORIDE 10 MG: 10 TABLET, FILM COATED ORAL at 21:25

## 2018-01-25 RX ADMIN — RISPERIDONE 2 MG: 1 TABLET ORAL at 21:25

## 2018-01-25 RX ADMIN — LITHIUM CARBONATE 300 MG: 300 TABLET ORAL at 21:25

## 2018-01-25 RX ADMIN — PANTOPRAZOLE SODIUM 40 MG: 40 TABLET, DELAYED RELEASE ORAL at 08:34

## 2018-01-25 RX ADMIN — METOPROLOL SUCCINATE 50 MG: 50 TABLET, EXTENDED RELEASE ORAL at 08:33

## 2018-01-25 RX ADMIN — TERAZOSIN HYDROCHLORIDE 2 MG: 1 CAPSULE ORAL at 21:25

## 2018-01-25 RX ADMIN — CLOPIDOGREL BISULFATE 75 MG: 75 TABLET ORAL at 08:34

## 2018-01-25 RX ADMIN — ESCITALOPRAM OXALATE 10 MG: 10 TABLET ORAL at 08:34

## 2018-01-25 RX ADMIN — RISPERIDONE 2 MG: 1 TABLET ORAL at 14:47

## 2018-01-25 RX ADMIN — TRAZODONE HYDROCHLORIDE 100 MG: 100 TABLET ORAL at 21:25

## 2018-01-25 RX ADMIN — HYDROXYZINE PAMOATE 25 MG: 25 CAPSULE ORAL at 09:43

## 2018-01-25 RX ADMIN — DULOXETINE 60 MG: 60 CAPSULE, DELAYED RELEASE ORAL at 08:34

## 2018-01-25 RX ADMIN — HYDROCHLOROTHIAZIDE 25 MG: 25 TABLET ORAL at 08:34

## 2018-01-26 PROCEDURE — 99232 SBSQ HOSP IP/OBS MODERATE 35: CPT | Performed by: PSYCHIATRY & NEUROLOGY

## 2018-01-26 RX ORDER — RISPERIDONE 1 MG/1
4 TABLET ORAL NIGHTLY
Status: DISCONTINUED | OUTPATIENT
Start: 2018-01-26 | End: 2018-01-29

## 2018-01-26 RX ORDER — GUAIFENESIN 600 MG/1
600 TABLET, EXTENDED RELEASE ORAL EVERY 12 HOURS SCHEDULED
Status: DISCONTINUED | OUTPATIENT
Start: 2018-01-26 | End: 2018-01-29

## 2018-01-26 RX ADMIN — TRAZODONE HYDROCHLORIDE 100 MG: 100 TABLET ORAL at 20:38

## 2018-01-26 RX ADMIN — HYDROCORTISONE: 1 CREAM TOPICAL at 08:23

## 2018-01-26 RX ADMIN — LITHIUM CARBONATE 300 MG: 300 TABLET ORAL at 08:23

## 2018-01-26 RX ADMIN — PANTOPRAZOLE SODIUM 40 MG: 40 TABLET, DELAYED RELEASE ORAL at 08:22

## 2018-01-26 RX ADMIN — GUAIFENESIN 600 MG: 600 TABLET, EXTENDED RELEASE ORAL at 20:38

## 2018-01-26 RX ADMIN — CLOPIDOGREL BISULFATE 75 MG: 75 TABLET ORAL at 08:22

## 2018-01-26 RX ADMIN — RISPERIDONE 2 MG: 1 TABLET ORAL at 08:22

## 2018-01-26 RX ADMIN — ATORVASTATIN CALCIUM 20 MG: 20 TABLET, FILM COATED ORAL at 20:38

## 2018-01-26 RX ADMIN — RISPERIDONE 4 MG: 1 TABLET ORAL at 20:38

## 2018-01-26 RX ADMIN — GUAIFENESIN 600 MG: 600 TABLET, EXTENDED RELEASE ORAL at 16:14

## 2018-01-26 RX ADMIN — METOPROLOL SUCCINATE 50 MG: 50 TABLET, EXTENDED RELEASE ORAL at 08:22

## 2018-01-26 RX ADMIN — HYDROCHLOROTHIAZIDE 25 MG: 25 TABLET ORAL at 08:22

## 2018-01-26 RX ADMIN — DULOXETINE 60 MG: 60 CAPSULE, DELAYED RELEASE ORAL at 08:22

## 2018-01-26 RX ADMIN — ASPIRIN 81 MG 81 MG: 81 TABLET ORAL at 08:22

## 2018-01-26 RX ADMIN — LITHIUM CARBONATE 300 MG: 300 TABLET ORAL at 17:35

## 2018-01-26 RX ADMIN — OLANZAPINE 5 MG: 5 TABLET, ORALLY DISINTEGRATING ORAL at 22:29

## 2018-01-26 RX ADMIN — DONEPEZIL HYDROCHLORIDE 10 MG: 10 TABLET, FILM COATED ORAL at 20:37

## 2018-01-26 RX ADMIN — TERAZOSIN HYDROCHLORIDE 2 MG: 1 CAPSULE ORAL at 20:38

## 2018-01-27 PROCEDURE — 99232 SBSQ HOSP IP/OBS MODERATE 35: CPT | Performed by: PSYCHIATRY & NEUROLOGY

## 2018-01-27 RX ORDER — TRAZODONE HYDROCHLORIDE 150 MG/1
150 TABLET ORAL NIGHTLY PRN
Status: DISCONTINUED | OUTPATIENT
Start: 2018-01-27 | End: 2018-02-01

## 2018-01-27 RX ORDER — LITHIUM CARBONATE 300 MG
600 TABLET ORAL NIGHTLY
Status: DISCONTINUED | OUTPATIENT
Start: 2018-01-27 | End: 2018-02-02 | Stop reason: HOSPADM

## 2018-01-27 RX ORDER — LITHIUM CARBONATE 300 MG
300 TABLET ORAL DAILY
Status: DISCONTINUED | OUTPATIENT
Start: 2018-01-28 | End: 2018-02-02 | Stop reason: HOSPADM

## 2018-01-27 RX ADMIN — ATORVASTATIN CALCIUM 20 MG: 20 TABLET, FILM COATED ORAL at 20:35

## 2018-01-27 RX ADMIN — PANTOPRAZOLE SODIUM 40 MG: 40 TABLET, DELAYED RELEASE ORAL at 08:56

## 2018-01-27 RX ADMIN — TERAZOSIN HYDROCHLORIDE 2 MG: 1 CAPSULE ORAL at 20:35

## 2018-01-27 RX ADMIN — GUAIFENESIN 600 MG: 600 TABLET, EXTENDED RELEASE ORAL at 08:56

## 2018-01-27 RX ADMIN — DULOXETINE 60 MG: 60 CAPSULE, DELAYED RELEASE ORAL at 08:56

## 2018-01-27 RX ADMIN — DONEPEZIL HYDROCHLORIDE 10 MG: 10 TABLET, FILM COATED ORAL at 20:35

## 2018-01-27 RX ADMIN — TRAZODONE HYDROCHLORIDE 150 MG: 150 TABLET ORAL at 20:35

## 2018-01-27 RX ADMIN — CLOPIDOGREL BISULFATE 75 MG: 75 TABLET ORAL at 08:56

## 2018-01-27 RX ADMIN — GUAIFENESIN 600 MG: 600 TABLET, EXTENDED RELEASE ORAL at 20:36

## 2018-01-27 RX ADMIN — RISPERIDONE 4 MG: 1 TABLET ORAL at 20:35

## 2018-01-27 RX ADMIN — LITHIUM CARBONATE 300 MG: 300 TABLET ORAL at 08:55

## 2018-01-27 RX ADMIN — LITHIUM CARBONATE 600 MG: 300 TABLET ORAL at 20:36

## 2018-01-27 RX ADMIN — OLANZAPINE 5 MG: 5 TABLET, ORALLY DISINTEGRATING ORAL at 20:35

## 2018-01-27 RX ADMIN — HYDROCHLOROTHIAZIDE 25 MG: 25 TABLET ORAL at 08:55

## 2018-01-27 RX ADMIN — ASPIRIN 81 MG 81 MG: 81 TABLET ORAL at 08:55

## 2018-01-27 RX ADMIN — METOPROLOL SUCCINATE 50 MG: 50 TABLET, EXTENDED RELEASE ORAL at 08:55

## 2018-01-28 LAB
ALBUMIN SERPL-MCNC: 4 G/DL (ref 3.4–4.8)
ALBUMIN/GLOB SERPL: 1.4 G/DL (ref 1.1–1.8)
ALP SERPL-CCNC: 90 U/L (ref 38–126)
ALT SERPL W P-5'-P-CCNC: 33 U/L (ref 21–72)
ANION GAP SERPL CALCULATED.3IONS-SCNC: 9 MMOL/L (ref 5–15)
AST SERPL-CCNC: 21 U/L (ref 17–59)
BILIRUB SERPL-MCNC: 1 MG/DL (ref 0.2–1.3)
BUN BLD-MCNC: 20 MG/DL (ref 7–21)
BUN/CREAT SERPL: 20.2 (ref 7–25)
CALCIUM SPEC-SCNC: 11 MG/DL (ref 8.4–10.2)
CHLORIDE SERPL-SCNC: 102 MMOL/L (ref 95–110)
CO2 SERPL-SCNC: 27 MMOL/L (ref 22–31)
CREAT BLD-MCNC: 0.99 MG/DL (ref 0.7–1.3)
DEPRECATED RDW RBC AUTO: 41.6 FL (ref 35.1–43.9)
ERYTHROCYTE [DISTWIDTH] IN BLOOD BY AUTOMATED COUNT: 12.6 % (ref 11.5–14.5)
GFR SERPL CREATININE-BSD FRML MDRD: 79 ML/MIN/1.73 (ref 56–130)
GLOBULIN UR ELPH-MCNC: 2.9 GM/DL (ref 2.3–3.5)
GLUCOSE BLD-MCNC: 92 MG/DL (ref 60–100)
HCT VFR BLD AUTO: 49 % (ref 39–49)
HGB BLD-MCNC: 17.3 G/DL (ref 13.7–17.3)
LITHIUM SERPL-SCNC: 0.5 MMOL/L (ref 0.6–1.2)
MCH RBC QN AUTO: 32 PG (ref 26.5–34)
MCHC RBC AUTO-ENTMCNC: 35.3 G/DL (ref 31.5–36.3)
MCV RBC AUTO: 90.7 FL (ref 80–98)
PLATELET # BLD AUTO: 170 10*3/MM3 (ref 150–450)
PMV BLD AUTO: 10.8 FL (ref 8–12)
POTASSIUM BLD-SCNC: 3.6 MMOL/L (ref 3.5–5.1)
PROT SERPL-MCNC: 6.9 G/DL (ref 6.3–8.6)
RBC # BLD AUTO: 5.4 10*6/MM3 (ref 4.37–5.74)
SODIUM BLD-SCNC: 138 MMOL/L (ref 137–145)
WBC NRBC COR # BLD: 8.52 10*3/MM3 (ref 3.2–9.8)

## 2018-01-28 PROCEDURE — 99232 SBSQ HOSP IP/OBS MODERATE 35: CPT | Performed by: PSYCHIATRY & NEUROLOGY

## 2018-01-28 PROCEDURE — 80053 COMPREHEN METABOLIC PANEL: CPT | Performed by: PSYCHIATRY & NEUROLOGY

## 2018-01-28 PROCEDURE — 85027 COMPLETE CBC AUTOMATED: CPT | Performed by: PSYCHIATRY & NEUROLOGY

## 2018-01-28 PROCEDURE — 80178 ASSAY OF LITHIUM: CPT | Performed by: PSYCHIATRY & NEUROLOGY

## 2018-01-28 RX ADMIN — HYDROXYZINE PAMOATE 25 MG: 25 CAPSULE ORAL at 08:43

## 2018-01-28 RX ADMIN — DONEPEZIL HYDROCHLORIDE 10 MG: 10 TABLET, FILM COATED ORAL at 20:29

## 2018-01-28 RX ADMIN — DULOXETINE 60 MG: 60 CAPSULE, DELAYED RELEASE ORAL at 08:45

## 2018-01-28 RX ADMIN — GUAIFENESIN 600 MG: 600 TABLET, EXTENDED RELEASE ORAL at 20:29

## 2018-01-28 RX ADMIN — CLOPIDOGREL BISULFATE 75 MG: 75 TABLET ORAL at 08:43

## 2018-01-28 RX ADMIN — ASPIRIN 81 MG 81 MG: 81 TABLET ORAL at 08:45

## 2018-01-28 RX ADMIN — RISPERIDONE 4 MG: 1 TABLET ORAL at 20:29

## 2018-01-28 RX ADMIN — PANTOPRAZOLE SODIUM 40 MG: 40 TABLET, DELAYED RELEASE ORAL at 08:45

## 2018-01-28 RX ADMIN — OLANZAPINE 5 MG: 5 TABLET, ORALLY DISINTEGRATING ORAL at 20:39

## 2018-01-28 RX ADMIN — HYDROCHLOROTHIAZIDE 25 MG: 25 TABLET ORAL at 08:43

## 2018-01-28 RX ADMIN — ATORVASTATIN CALCIUM 20 MG: 20 TABLET, FILM COATED ORAL at 20:29

## 2018-01-28 RX ADMIN — GUAIFENESIN 600 MG: 600 TABLET, EXTENDED RELEASE ORAL at 08:45

## 2018-01-28 RX ADMIN — LITHIUM CARBONATE 600 MG: 300 TABLET ORAL at 20:29

## 2018-01-28 RX ADMIN — LITHIUM CARBONATE 300 MG: 300 TABLET ORAL at 08:44

## 2018-01-28 RX ADMIN — METOPROLOL SUCCINATE 50 MG: 50 TABLET, EXTENDED RELEASE ORAL at 08:44

## 2018-01-28 RX ADMIN — TERAZOSIN HYDROCHLORIDE 2 MG: 1 CAPSULE ORAL at 20:29

## 2018-01-29 PROCEDURE — 99232 SBSQ HOSP IP/OBS MODERATE 35: CPT | Performed by: PSYCHIATRY & NEUROLOGY

## 2018-01-29 RX ORDER — OLANZAPINE 20 MG/1
20 TABLET, ORALLY DISINTEGRATING ORAL NIGHTLY
Status: DISCONTINUED | OUTPATIENT
Start: 2018-01-29 | End: 2018-01-30

## 2018-01-29 RX ORDER — HYDROCORTISONE 5 MG/1
5 TABLET ORAL 2 TIMES DAILY WITH MEALS
Status: DISCONTINUED | OUTPATIENT
Start: 2018-01-29 | End: 2018-01-30

## 2018-01-29 RX ADMIN — METOPROLOL SUCCINATE 50 MG: 50 TABLET, EXTENDED RELEASE ORAL at 08:13

## 2018-01-29 RX ADMIN — DONEPEZIL HYDROCHLORIDE 10 MG: 10 TABLET, FILM COATED ORAL at 21:07

## 2018-01-29 RX ADMIN — LITHIUM CARBONATE 300 MG: 300 TABLET ORAL at 08:12

## 2018-01-29 RX ADMIN — TRAZODONE HYDROCHLORIDE 150 MG: 150 TABLET ORAL at 21:07

## 2018-01-29 RX ADMIN — OLANZAPINE 20 MG: 20 TABLET, ORALLY DISINTEGRATING ORAL at 21:06

## 2018-01-29 RX ADMIN — DULOXETINE 60 MG: 60 CAPSULE, DELAYED RELEASE ORAL at 08:12

## 2018-01-29 RX ADMIN — CLOPIDOGREL BISULFATE 75 MG: 75 TABLET ORAL at 08:12

## 2018-01-29 RX ADMIN — ATORVASTATIN CALCIUM 20 MG: 20 TABLET, FILM COATED ORAL at 21:11

## 2018-01-29 RX ADMIN — HYDROCHLOROTHIAZIDE 25 MG: 25 TABLET ORAL at 08:12

## 2018-01-29 RX ADMIN — LITHIUM CARBONATE 600 MG: 300 TABLET ORAL at 21:06

## 2018-01-29 RX ADMIN — PANTOPRAZOLE SODIUM 40 MG: 40 TABLET, DELAYED RELEASE ORAL at 08:12

## 2018-01-29 RX ADMIN — HYDROXYZINE PAMOATE 25 MG: 25 CAPSULE ORAL at 15:23

## 2018-01-29 RX ADMIN — HYDROCORTISONE 5 MG: 5 TABLET ORAL at 17:01

## 2018-01-29 RX ADMIN — GUAIFENESIN 600 MG: 600 TABLET, EXTENDED RELEASE ORAL at 08:12

## 2018-01-29 RX ADMIN — ASPIRIN 81 MG 81 MG: 81 TABLET ORAL at 08:12

## 2018-01-29 RX ADMIN — TERAZOSIN HYDROCHLORIDE 2 MG: 1 CAPSULE ORAL at 21:07

## 2018-01-30 LAB
AMPHETAMINES UR QL: NEGATIVE
LITHIUM SERPL-SCNC: 0.7 MMOL/L (ref 0.6–1.2)
Lab: NORMAL

## 2018-01-30 PROCEDURE — 63710000001 DIPHENHYDRAMINE PER 50 MG: Performed by: PSYCHIATRY & NEUROLOGY

## 2018-01-30 PROCEDURE — 99232 SBSQ HOSP IP/OBS MODERATE 35: CPT | Performed by: PSYCHIATRY & NEUROLOGY

## 2018-01-30 PROCEDURE — 80178 ASSAY OF LITHIUM: CPT | Performed by: PSYCHIATRY & NEUROLOGY

## 2018-01-30 PROCEDURE — 25010000002 ZIPRASIDONE MESYLATE PER 10 MG: Performed by: PSYCHIATRY & NEUROLOGY

## 2018-01-30 RX ORDER — HYDROCORTISONE 10 MG/1
10 TABLET ORAL 2 TIMES DAILY WITH MEALS
Status: DISCONTINUED | OUTPATIENT
Start: 2018-01-30 | End: 2018-01-31

## 2018-01-30 RX ORDER — ZIPRASIDONE MESYLATE 20 MG/ML
10 INJECTION, POWDER, LYOPHILIZED, FOR SOLUTION INTRAMUSCULAR ONCE
Status: COMPLETED | OUTPATIENT
Start: 2018-01-30 | End: 2018-01-30

## 2018-01-30 RX ORDER — DIPHENHYDRAMINE HCL 50 MG
50 CAPSULE ORAL EVERY 6 HOURS
Status: DISCONTINUED | OUTPATIENT
Start: 2018-01-30 | End: 2018-02-02 | Stop reason: HOSPADM

## 2018-01-30 RX ADMIN — TERAZOSIN HYDROCHLORIDE 2 MG: 1 CAPSULE ORAL at 20:36

## 2018-01-30 RX ADMIN — METOPROLOL SUCCINATE 50 MG: 50 TABLET, EXTENDED RELEASE ORAL at 08:14

## 2018-01-30 RX ADMIN — DULOXETINE 60 MG: 60 CAPSULE, DELAYED RELEASE ORAL at 08:13

## 2018-01-30 RX ADMIN — HYDROXYZINE PAMOATE 25 MG: 25 CAPSULE ORAL at 04:46

## 2018-01-30 RX ADMIN — HYDROCHLOROTHIAZIDE 25 MG: 25 TABLET ORAL at 08:47

## 2018-01-30 RX ADMIN — HYDROCORTISONE 5 MG: 5 TABLET ORAL at 08:13

## 2018-01-30 RX ADMIN — HYDROCORTISONE: 1 CREAM TOPICAL at 07:51

## 2018-01-30 RX ADMIN — LITHIUM CARBONATE 300 MG: 300 TABLET ORAL at 08:14

## 2018-01-30 RX ADMIN — ATORVASTATIN CALCIUM 20 MG: 20 TABLET, FILM COATED ORAL at 20:36

## 2018-01-30 RX ADMIN — LITHIUM CARBONATE 600 MG: 300 TABLET ORAL at 20:37

## 2018-01-30 RX ADMIN — DONEPEZIL HYDROCHLORIDE 10 MG: 10 TABLET, FILM COATED ORAL at 20:36

## 2018-01-30 RX ADMIN — PANTOPRAZOLE SODIUM 40 MG: 40 TABLET, DELAYED RELEASE ORAL at 08:14

## 2018-01-30 RX ADMIN — CLOPIDOGREL BISULFATE 75 MG: 75 TABLET ORAL at 08:13

## 2018-01-30 RX ADMIN — DIPHENHYDRAMINE HYDROCHLORIDE 50 MG: 50 CAPSULE ORAL at 12:53

## 2018-01-30 RX ADMIN — DIPHENHYDRAMINE HYDROCHLORIDE 50 MG: 50 CAPSULE ORAL at 20:37

## 2018-01-30 RX ADMIN — QUETIAPINE 800 MG: 100 TABLET ORAL at 20:36

## 2018-01-30 RX ADMIN — TRAZODONE HYDROCHLORIDE 150 MG: 150 TABLET ORAL at 20:37

## 2018-01-30 RX ADMIN — ZIPRASIDONE MESYLATE 10 MG: 20 INJECTION, POWDER, LYOPHILIZED, FOR SOLUTION INTRAMUSCULAR at 16:59

## 2018-01-30 RX ADMIN — ASPIRIN 81 MG 81 MG: 81 TABLET ORAL at 08:14

## 2018-01-31 PROCEDURE — 99232 SBSQ HOSP IP/OBS MODERATE 35: CPT | Performed by: PSYCHIATRY & NEUROLOGY

## 2018-01-31 PROCEDURE — 63710000001 DIPHENHYDRAMINE PER 50 MG: Performed by: PSYCHIATRY & NEUROLOGY

## 2018-01-31 RX ORDER — HALOPERIDOL 5 MG/1
10 TABLET ORAL 2 TIMES DAILY
Status: DISCONTINUED | OUTPATIENT
Start: 2018-01-31 | End: 2018-02-02 | Stop reason: HOSPADM

## 2018-01-31 RX ADMIN — HYDROCHLOROTHIAZIDE 25 MG: 25 TABLET ORAL at 08:52

## 2018-01-31 RX ADMIN — LITHIUM CARBONATE 600 MG: 300 TABLET ORAL at 20:52

## 2018-01-31 RX ADMIN — TERAZOSIN HYDROCHLORIDE 2 MG: 1 CAPSULE ORAL at 20:52

## 2018-01-31 RX ADMIN — METOPROLOL SUCCINATE 50 MG: 50 TABLET, EXTENDED RELEASE ORAL at 08:52

## 2018-01-31 RX ADMIN — PANTOPRAZOLE SODIUM 40 MG: 40 TABLET, DELAYED RELEASE ORAL at 08:52

## 2018-01-31 RX ADMIN — DIPHENHYDRAMINE HYDROCHLORIDE 50 MG: 50 CAPSULE ORAL at 18:28

## 2018-01-31 RX ADMIN — TRAZODONE HYDROCHLORIDE 150 MG: 150 TABLET ORAL at 20:52

## 2018-01-31 RX ADMIN — DONEPEZIL HYDROCHLORIDE 10 MG: 10 TABLET, FILM COATED ORAL at 20:52

## 2018-01-31 RX ADMIN — ATORVASTATIN CALCIUM 20 MG: 20 TABLET, FILM COATED ORAL at 20:52

## 2018-01-31 RX ADMIN — DIPHENHYDRAMINE HYDROCHLORIDE 50 MG: 50 CAPSULE ORAL at 04:25

## 2018-01-31 RX ADMIN — LITHIUM CARBONATE 300 MG: 300 TABLET ORAL at 08:52

## 2018-01-31 RX ADMIN — HALOPERIDOL 10 MG: 5 TABLET ORAL at 11:09

## 2018-01-31 RX ADMIN — HYDROCORTISONE: 1 CREAM TOPICAL at 10:20

## 2018-01-31 RX ADMIN — HALOPERIDOL 10 MG: 5 TABLET ORAL at 20:52

## 2018-01-31 RX ADMIN — HYDROCORTISONE: 1 CREAM TOPICAL at 13:08

## 2018-01-31 RX ADMIN — HYDROCORTISONE 10 MG: 10 TABLET ORAL at 08:52

## 2018-01-31 RX ADMIN — DIPHENHYDRAMINE HYDROCHLORIDE 50 MG: 50 CAPSULE ORAL at 11:16

## 2018-01-31 RX ADMIN — CLOPIDOGREL BISULFATE 75 MG: 75 TABLET ORAL at 08:51

## 2018-01-31 RX ADMIN — ASPIRIN 81 MG 81 MG: 81 TABLET ORAL at 08:51

## 2018-01-31 RX ADMIN — DULOXETINE 60 MG: 60 CAPSULE, DELAYED RELEASE ORAL at 08:51

## 2018-02-01 PROCEDURE — 63710000001 DIPHENHYDRAMINE PER 50 MG: Performed by: PSYCHIATRY & NEUROLOGY

## 2018-02-01 PROCEDURE — 99232 SBSQ HOSP IP/OBS MODERATE 35: CPT | Performed by: PSYCHIATRY & NEUROLOGY

## 2018-02-01 RX ORDER — TEMAZEPAM 15 MG/1
15 CAPSULE ORAL NIGHTLY PRN
Status: DISCONTINUED | OUTPATIENT
Start: 2018-02-01 | End: 2018-02-02 | Stop reason: HOSPADM

## 2018-02-01 RX ADMIN — DIPHENHYDRAMINE HYDROCHLORIDE 50 MG: 50 CAPSULE ORAL at 06:16

## 2018-02-01 RX ADMIN — HALOPERIDOL 10 MG: 5 TABLET ORAL at 08:05

## 2018-02-01 RX ADMIN — ASPIRIN 81 MG 81 MG: 81 TABLET ORAL at 08:04

## 2018-02-01 RX ADMIN — DIPHENHYDRAMINE HYDROCHLORIDE 50 MG: 50 CAPSULE ORAL at 01:02

## 2018-02-01 RX ADMIN — DULOXETINE 60 MG: 60 CAPSULE, DELAYED RELEASE ORAL at 08:05

## 2018-02-01 RX ADMIN — TEMAZEPAM 15 MG: 15 CAPSULE ORAL at 21:05

## 2018-02-01 RX ADMIN — DONEPEZIL HYDROCHLORIDE 10 MG: 10 TABLET, FILM COATED ORAL at 21:05

## 2018-02-01 RX ADMIN — PANTOPRAZOLE SODIUM 40 MG: 40 TABLET, DELAYED RELEASE ORAL at 08:05

## 2018-02-01 RX ADMIN — HALOPERIDOL 10 MG: 5 TABLET ORAL at 21:05

## 2018-02-01 RX ADMIN — METOPROLOL SUCCINATE 50 MG: 50 TABLET, EXTENDED RELEASE ORAL at 08:05

## 2018-02-01 RX ADMIN — TERAZOSIN HYDROCHLORIDE 2 MG: 1 CAPSULE ORAL at 21:05

## 2018-02-01 RX ADMIN — DIPHENHYDRAMINE HYDROCHLORIDE 50 MG: 50 CAPSULE ORAL at 12:15

## 2018-02-01 RX ADMIN — LITHIUM CARBONATE 300 MG: 300 TABLET ORAL at 08:05

## 2018-02-01 RX ADMIN — DIPHENHYDRAMINE HYDROCHLORIDE 50 MG: 50 CAPSULE ORAL at 17:16

## 2018-02-01 RX ADMIN — ATORVASTATIN CALCIUM 20 MG: 20 TABLET, FILM COATED ORAL at 21:05

## 2018-02-01 RX ADMIN — HYDROCHLOROTHIAZIDE 25 MG: 25 TABLET ORAL at 08:05

## 2018-02-01 RX ADMIN — LITHIUM CARBONATE 600 MG: 300 TABLET ORAL at 21:05

## 2018-02-01 RX ADMIN — CLOPIDOGREL BISULFATE 75 MG: 75 TABLET ORAL at 08:04

## 2018-02-01 NOTE — NURSING NOTE
"Behavior   Anxiety: Feeling anxious  Depression: anxiety  Pain  0  AVH   no  S/I   no  H/I   no    Affect   flat    Noted:  Pt is alert, oriented x3 verbal and ambulatory. He reports not sleeping well last night and being up since \"midnight\". He reports some anxiety and depression. Appropriate interaction with staff and peers.       Intervention  Instructed in medication usage and effects  Medications administered as ordered  Encouraged to verbalize needs      Response  Verbalized understanding   Did patient take medications as ordered yes   Did patient interact with assessment?  yes     Plan  Will monitor for safety  Will monitor every 15 minutes as ordered  Will evaluate and promote the plan of care  "

## 2018-02-01 NOTE — PLAN OF CARE
Problem: BH Overarching Goals  Goal: Optimized Coping Skills in Response to Life Stressors  Outcome: Ongoing (interventions implemented as appropriate)    Goal: Develops/Participates in Therapeutic [place] to Support Successful Transition  Outcome: Ongoing (interventions implemented as appropriate)      Problem: BH Patient Care Overview (Adult)  Goal: Plan of Care Review  Outcome: Ongoing (interventions implemented as appropriate)    Goal: Interdisciplinary Rounds/Family Conference  Outcome: Ongoing (interventions implemented as appropriate)    Goal: Individualization and Mutuality  Outcome: Ongoing (interventions implemented as appropriate)    Goal: Discharge Needs Assessment  Outcome: Ongoing (interventions implemented as appropriate)

## 2018-02-01 NOTE — NURSING NOTE
"Pt came to desk and appeared frightened and was staring at the floor while he walked. Pt stated \"Im going crazy.\" Pt complains that \"the voices\" are keeping him from going back to sleep, but stated \"they aren't saying anything bad. They just won't shut up.\" Pt got a drink and returned to his room.   "

## 2018-02-01 NOTE — NURSING NOTE
"Pt has been up multiple times throughout the night and pacing the halls. Pt appears anxious and restless. Pt has complained of lack of sleep and stated \"the Seroquel helped me sleep better, but still not how I needed to.\" Pt has been complaining about \"the voices\". Pt has returned to his room.     "

## 2018-02-01 NOTE — NURSING NOTE
Behavior   Anxiety: Feeling anxious  Depression: depressed mood and anxiety  Pain  0  AVH   yes   S/I   no  H/I   no    Affect   anxious    Note:  Pt has appeared more relaxed than last night. Pt does still appear anxious. Pt appears to be hopeful with discontinuation of Seroquel and beginning of new medication, Haldol. Pt has been interacting well tonight and sat at table with other patients. Pt is currently resting at this time.   Intervention  Instructed in medication usage and effects  Medications administered as ordered  Encouraged to verbalize needs      Response  Verbalized understanding   Did patient take medications as ordered yes   Did patient interact with assessment?  yes     Plan  Will monitor for safety  Will monitor every 15 minutes as ordered  Will evaluate and promote the plan of care

## 2018-02-01 NOTE — PROGRESS NOTES
2/1/2018    Chief Complaint: suicidal ideation, hallucinations and depression    Subjective:  Patient is a 52 y.o. male who was hospitalized for suicidal ideation, hallucinations and depression.   Since yesterday the patient has been less anxious and agitated.  He notes that haldol has been more helpful for his AH.  He notes his SI is now becoming intermittent instead of constant.  He seems calmer.  His rash is getting better on the benadryl.  The hydrocortisone tablet appears to have made him more agitated.  He continues to complain that he could not sleep at all last night.  He has failed trazodone, seroquel and remeron for sleep.  He has found ambien helpful in the past but had some sleep behaviors with finding himself with potato chip bags.  He has been on temazepam and that did help.    Objective     Vital Signs    Temp:  [97.8 °F (36.6 °C)-98.7 °F (37.1 °C)] 97.8 °F (36.6 °C)  Heart Rate:  [76-87] 76  Resp:  [16-18] 16  BP: (100-128)/(65-74) 128/74    Physical Exam:   General Appearance: alert, appears stated age and cooperative,  Hygiene:   good  Gait & Station: Normal  Musculoskeletal: No tremors or abnormal involuntary movements    Mental Status Exam:   Cooperation:  Cooperative  Eye Contact:  Good  Psychomotor Behavior:  Appropriate  Mood: Sad/Depressed  Affect:  flat  Speech:  Normal  Thought Process:  Coherent  Associations: Goal Directed  Thought Content:     Normal   Suicidal:  intermittent passive SI   Homicidal:  None   Hallucinations:  None   Delusion:  None  Cognitive Functioning:   Consciousness: awake, alert and oriented  Reliability:  fair  Insight:  Fair  Judgement:  Fair  Impulse Control:  Fair    Lab Results (last 24 hours)     ** No results found for the last 24 hours. **        Imaging Results (last 24 hours)     ** No results found for the last 24 hours. **          Medicine:   Current Facility-Administered Medications:   •  acetaminophen (TYLENOL) tablet 650 mg, 650 mg, Oral, Q4H PRN,  Gerry Trinidad MD  •  aspirin chewable tablet 81 mg, 81 mg, Oral, Daily, Gerry Trinidad MD, 81 mg at 02/01/18 0804  •  atorvastatin (LIPITOR) tablet 20 mg, 20 mg, Oral, Nightly, Gerry Trinidad MD, 20 mg at 01/31/18 2052  •  CloNIDine (CATAPRES) tablet 0.1 mg, 0.1 mg, Oral, Q4H PRN, Gerry Trinidad MD  •  clopidogrel (PLAVIX) tablet 75 mg, 75 mg, Oral, Daily, Gerry Trinidad MD, 75 mg at 02/01/18 0804  •  diphenhydrAMINE (BENADRYL) capsule 50 mg, 50 mg, Oral, Q6H, Gerry Trinidad MD, 50 mg at 02/01/18 1215  •  docusate sodium (COLACE) capsule 100 mg, 100 mg, Oral, BID PRN, Gerry Trinidad MD  •  donepezil (ARICEPT) tablet 10 mg, 10 mg, Oral, Nightly, Gerry Trinidad MD, 10 mg at 01/31/18 2052  •  [COMPLETED] DULoxetine (CYMBALTA) DR capsule 30 mg, 30 mg, Oral, Daily, 30 mg at 01/24/18 1506 **FOLLOWED BY** DULoxetine (CYMBALTA) DR capsule 60 mg, 60 mg, Oral, Daily, Gerry Trinidad MD, 60 mg at 02/01/18 0805  •  haloperidol (HALDOL) tablet 10 mg, 10 mg, Oral, BID, Gerry Trinidad MD, 10 mg at 02/01/18 0805  •  [DISCONTINUED] losartan (COZAAR) tablet 100 mg, 100 mg, Oral, Q24H, 100 mg at 01/22/18 0811 **AND** hydrochlorothiazide (HYDRODIURIL) tablet 25 mg, 25 mg, Oral, Daily, Gerry Trinidad MD, 25 mg at 02/01/18 0805  •  hydrocortisone 1 % cream, , Topical, 4x Daily PRN, Isidro Murdock MD  •  lithium tablet 300 mg, 300 mg, Oral, Daily, Johan Casas III, MD, 300 mg at 02/01/18 0805  •  lithium tablet 600 mg, 600 mg, Oral, Nightly, Johan Casas III, MD, 600 mg at 01/31/18 2052  •  loperamide (IMODIUM) capsule 2 mg, 2 mg, Oral, 4x Daily PRN, Gerry Trinidad MD  •  magnesium hydroxide (MILK OF MAGNESIA) suspension 2400 mg/10mL 10 mL, 10 mL, Oral, Daily PRN, Gerry Trinidad MD  •  meclizine (ANTIVERT) tablet 12.5 mg, 12.5 mg, Oral, BID PRN, Gerry Trinidad MD  •  metoprolol succinate XL (TOPROL-XL) 24 hr tablet 50 mg, 50 mg, Oral, Daily, Gerry Trinidad MD, 50 mg at  02/01/18 0805  •  ondansetron (ZOFRAN) tablet 4 mg, 4 mg, Oral, Q6H PRN, Gerry Trinidad MD, 4 mg at 01/20/18 1512  •  pantoprazole (PROTONIX) EC tablet 40 mg, 40 mg, Oral, Daily, Gerry Trinidad MD, 40 mg at 02/01/18 0805  •  terazosin (HYTRIN) capsule 2 mg, 2 mg, Oral, Nightly, Gerry Trinidad MD, 2 mg at 01/31/18 2052  •  traZODone (DESYREL) tablet 150 mg, 150 mg, Oral, Nightly PRN, Johan Casas III, MD, 150 mg at 01/31/18 2052    Diagnoses/Assessment:   Active Problems:    Psychotic disorder    Dementia in Alzheimer's disease with early onset    Mood disorder      Treatment Plan:    1) Will continue care for the patient on the behavioral health unit at [facility] to ensure patient safety.  2) Will continue to provide treatment with the unit milieu, activities, therapies and psychopharmacological management.  3) Patient to be placed on or continued on  Q15 minute checks  and Suicide, Elopement and Aggression precautions.  4) Will continue medical management by Hospitalist.  5) Will order following labs: none  6) Will make the following medication changes: Will continue the haldol, lithium, cymbalta and aricept.  Will stop trazodone and start restoril 15mg qhs.  7) Will continue discharge planning as appropriate for patient.  8) Psychotherapy provided for less than 16 minutes.    Treatment plan and medication risks and benefits discussed with: Patient    Gerry Trinidad MD  02/01/18  2:15 PM

## 2018-02-01 NOTE — PLAN OF CARE
Problem: Alteration in Thoughts and Perception  Goal: Treatment Goal: Gain control of psychotic behaviors/thinking, reduce/eliminate presenting symptoms and demonstrate improved reality functioning upon discharge  Outcome: Ongoing (interventions implemented as appropriate)    Goal: Refrain from acting on delusional thinking/internal stimuli  Outcome: Ongoing (interventions implemented as appropriate)    Goal: Attend and participate in unit activities, including therapeutic, recreational, and educational groups  Outcome: Ongoing (interventions implemented as appropriate)      Problem: Risk for Self Injury/Neglect  Goal: Treatment Goal: Remain safe during length of stay, learn and adopt new coping skills, and be free of self-injurious ideation, impulses and acts at the time of discharge  Outcome: Ongoing (interventions implemented as appropriate)    Goal: Refrain from harming self  Outcome: Ongoing (interventions implemented as appropriate)    Goal: Attend and participate in unit activities, including therapeutic, recreational, and educational groups  Outcome: Ongoing (interventions implemented as appropriate)    Goal: Recognize maladaptive responses and adopt new coping mechanisms  Outcome: Ongoing (interventions implemented as appropriate)

## 2018-02-02 VITALS
HEIGHT: 73 IN | BODY MASS INDEX: 26.97 KG/M2 | RESPIRATION RATE: 14 BRPM | HEART RATE: 79 BPM | SYSTOLIC BLOOD PRESSURE: 122 MMHG | TEMPERATURE: 98 F | OXYGEN SATURATION: 94 % | DIASTOLIC BLOOD PRESSURE: 68 MMHG | WEIGHT: 203.48 LBS

## 2018-02-02 PROCEDURE — 63710000001 DIPHENHYDRAMINE PER 50 MG: Performed by: PSYCHIATRY & NEUROLOGY

## 2018-02-02 PROCEDURE — 99238 HOSP IP/OBS DSCHRG MGMT 30/<: CPT | Performed by: PSYCHIATRY & NEUROLOGY

## 2018-02-02 RX ORDER — DIPHENHYDRAMINE HCL 50 MG
50 CAPSULE ORAL EVERY 6 HOURS PRN
Qty: 30 CAPSULE | Refills: 0 | Status: ON HOLD | OUTPATIENT
Start: 2018-02-02 | End: 2018-03-08

## 2018-02-02 RX ORDER — HYDROCHLOROTHIAZIDE 25 MG/1
25 TABLET ORAL DAILY
Qty: 30 TABLET | Refills: 0 | Status: ON HOLD | OUTPATIENT
Start: 2018-02-03 | End: 2018-03-08

## 2018-02-02 RX ORDER — DULOXETIN HYDROCHLORIDE 60 MG/1
60 CAPSULE, DELAYED RELEASE ORAL DAILY
Qty: 30 CAPSULE | Refills: 0 | Status: ON HOLD | OUTPATIENT
Start: 2018-02-03 | End: 2018-03-02

## 2018-02-02 RX ORDER — HALOPERIDOL 10 MG/1
10 TABLET ORAL 2 TIMES DAILY
Qty: 60 TABLET | Refills: 0 | Status: SHIPPED | OUTPATIENT
Start: 2018-02-02 | End: 2018-03-02 | Stop reason: HOSPADM

## 2018-02-02 RX ORDER — TEMAZEPAM 15 MG/1
15 CAPSULE ORAL NIGHTLY PRN
Qty: 30 CAPSULE | Refills: 0 | Status: SHIPPED | OUTPATIENT
Start: 2018-02-02 | End: 2018-02-11

## 2018-02-02 RX ORDER — LITHIUM CARBONATE 450 MG
450 TABLET, EXTENDED RELEASE ORAL EVERY 12 HOURS SCHEDULED
Qty: 60 TABLET | Refills: 0 | Status: SHIPPED | OUTPATIENT
Start: 2018-02-02 | End: 2018-03-02 | Stop reason: HOSPADM

## 2018-02-02 RX ADMIN — PANTOPRAZOLE SODIUM 40 MG: 40 TABLET, DELAYED RELEASE ORAL at 08:13

## 2018-02-02 RX ADMIN — ASPIRIN 81 MG 81 MG: 81 TABLET ORAL at 08:12

## 2018-02-02 RX ADMIN — DIPHENHYDRAMINE HYDROCHLORIDE 50 MG: 50 CAPSULE ORAL at 11:57

## 2018-02-02 RX ADMIN — DULOXETINE 60 MG: 60 CAPSULE, DELAYED RELEASE ORAL at 08:13

## 2018-02-02 RX ADMIN — DIPHENHYDRAMINE HYDROCHLORIDE 50 MG: 50 CAPSULE ORAL at 06:25

## 2018-02-02 RX ADMIN — HYDROCHLOROTHIAZIDE 25 MG: 25 TABLET ORAL at 08:13

## 2018-02-02 RX ADMIN — LITHIUM CARBONATE 300 MG: 300 TABLET ORAL at 08:12

## 2018-02-02 RX ADMIN — METOPROLOL SUCCINATE 50 MG: 50 TABLET, EXTENDED RELEASE ORAL at 08:12

## 2018-02-02 RX ADMIN — CLOPIDOGREL BISULFATE 75 MG: 75 TABLET ORAL at 08:13

## 2018-02-02 RX ADMIN — HALOPERIDOL 10 MG: 5 TABLET ORAL at 08:13

## 2018-02-02 NOTE — PLAN OF CARE
Problem: BH Overarching Goals  Goal: Adheres to Safety Considerations for Self and Others  Outcome: Ongoing (interventions implemented as appropriate)    Goal: Optimized Coping Skills in Response to Life Stressors  Outcome: Ongoing (interventions implemented as appropriate)    Goal: Develops/Participates in Therapeutic [place] to Support Successful Transition  Outcome: Ongoing (interventions implemented as appropriate)      Problem: BH Patient Care Overview (Adult)  Goal: Individualization and Mutuality  Outcome: Ongoing (interventions implemented as appropriate)    Goal: Discharge Needs Assessment  Outcome: Ongoing (interventions implemented as appropriate)

## 2018-02-02 NOTE — DISCHARGE SUMMARY
Pt presented in interview room dressed appropriately. Mood was calm, affect was bright, Follow up appointment was made with [facility]. Pt. was given information on [employer] meal- home delivery as he cannot cook for himself. Pt denies SI/Hi, AVH. CSW educated pt on Crisis Hotline. Pt. stated he is comfortable living on his own for next couple of days.BPRS was completed upon dc.

## 2018-02-02 NOTE — PLAN OF CARE
Problem:  Overarching Goals  Goal: Adheres to Safety Considerations for Self and Others  Outcome: Outcome(s) achieved Date Met: 02/02/18    Goal: Optimized Coping Skills in Response to Life Stressors  Outcome: Outcome(s) achieved Date Met: 02/02/18    Goal: Develops/Participates in Therapeutic [place] to Support Successful Transition  Outcome: Outcome(s) achieved Date Met: 02/02/18      Problem:  Patient Care Overview (Adult)  Goal: Plan of Care Review  Outcome: Outcome(s) achieved Date Met: 02/02/18    Goal: Interdisciplinary Rounds/Family Conference  Outcome: Outcome(s) achieved Date Met: 02/02/18    Goal: Individualization and Mutuality  Outcome: Outcome(s) achieved Date Met: 02/02/18    Goal: Discharge Needs Assessment  Outcome: Outcome(s) achieved Date Met: 02/02/18      Problem: Alteration in Thoughts and Perception  Goal: Treatment Goal: Gain control of psychotic behaviors/thinking, reduce/eliminate presenting symptoms and demonstrate improved reality functioning upon discharge  Outcome: Outcome(s) achieved Date Met: 02/02/18    Goal: Refrain from acting on delusional thinking/internal stimuli  Outcome: Outcome(s) achieved Date Met: 02/02/18    Goal: Agree to be compliant with medication regime, as prescribed and report medication side effects  Outcome: Outcome(s) achieved Date Met: 02/02/18    Goal: Attend and participate in unit activities, including therapeutic, recreational, and educational groups  Outcome: Outcome(s) achieved Date Met: 02/02/18      Problem: Risk for Self Injury/Neglect  Goal: Treatment Goal: Remain safe during length of stay, learn and adopt new coping skills, and be free of self-injurious ideation, impulses and acts at the time of discharge  Outcome: Outcome(s) achieved Date Met: 02/02/18    Goal: Refrain from harming self  Outcome: Outcome(s) achieved Date Met: 02/02/18    Goal: Attend and participate in unit activities, including therapeutic, recreational, and educational  groups  Outcome: Outcome(s) achieved Date Met: 02/02/18    Goal: Recognize maladaptive responses and adopt new coping mechanisms  Outcome: Outcome(s) achieved Date Met: 02/02/18
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
## 2018-02-02 NOTE — NURSING NOTE
Behavior   Anxiety: Difficulty concentrating  Depression: difficulty concentrating  Pain  0  AVH   no  S/I   no  H/I   no    Affect   calm and pleasant    Note:Pt is alert, oriented x3 verbal and ambulatory. Denies hearing voices, denies anxiety or depression.  States he slept much better last night. Appropriate interaction with staff and peers. Will continue to monitor for safety.       Intervention  Instructed in medication usage and effects  Medications administered as ordered  Encouraged to verbalize needs      Response  Verbalized understanding   Did patient take medications as ordered yes   Did patient interact with assessment?  yes     Plan  Will monitor for safety  Will monitor every 15 minutes as ordered  Will evaluate and promote the plan of care

## 2018-02-02 NOTE — NURSING NOTE
Pt discharged per 's orders. Aftercare discharge plan reviewed with pt and copy given. Pt was alert, oriented x3 verbal and ambulatory upon leaving. He had all belongings upon discharge.

## 2018-02-02 NOTE — CONSULTS
"Adult Nutrition  Assessment    Patient Name:  Nick Abreu  YOB: 1965  MRN: [id number]  Admit Date:  1/19/2018    Assessment Date:  2/1/2018    Comments:  Pt indicates his appetite is alright.  Voiced no food preferences.  Intake 100% - 9x, 100% - 1x.  Meds and labs reviewed.          Reason for Assessment       02/01/18 1717    Reason for Assessment    Reason For Assessment/Visit follow up protocol                  Anthropometrics       02/01/18 1717    Anthropometrics    Height 185.4 cm (72.99\")    Weight 92.3 kg (203 lb 7.8 oz)    Ideal Body Weight (IBW)    Ideal Body Weight (IBW), Male (kg) 84.84    % Ideal Body Weight 109.02    Body Mass Index (BMI)    BMI (kg/m2) 26.91    BMI Grade 25 - 29.9 - overweight            Labs/Tests/Procedures/Meds       02/01/18 1717    Labs/Tests/Procedures/Meds    Labs/Tests Review Glucose    Medication Review Reviewed, pertinent                Nutrition Prescription Ordered       02/01/18 1717    Nutrition Prescription PO    Current PO Diet Regular            Evaluation of Received Nutrient/Fluid Intake       02/01/18 1717    PO Evaluation    Number of Meals 9   plus 1 snack    % PO Intake 100% - 9x plus 100% for 1 snack            Electronically signed by:  Graciela Brown RD  02/01/18 7:26 PM  "

## 2018-02-14 ENCOUNTER — HOSPITAL ENCOUNTER (EMERGENCY)
Facility: HOSPITAL | Age: 53
Discharge: PSYCHIATRIC OR SUBSTANCE USE FACILITY | End: 2018-02-14
Attending: EMERGENCY MEDICINE | Admitting: EMERGENCY MEDICINE

## 2018-02-14 ENCOUNTER — HOSPITAL ENCOUNTER (INPATIENT)
Facility: HOSPITAL | Age: 53
LOS: 16 days | Discharge: HOME | End: 2018-03-02
Attending: PSYCHIATRY & NEUROLOGY | Admitting: PSYCHIATRY & NEUROLOGY

## 2018-02-14 VITALS
SYSTOLIC BLOOD PRESSURE: 155 MMHG | BODY MASS INDEX: 26.51 KG/M2 | TEMPERATURE: 97.7 F | HEIGHT: 73 IN | HEART RATE: 98 BPM | OXYGEN SATURATION: 98 % | DIASTOLIC BLOOD PRESSURE: 97 MMHG | WEIGHT: 200 LBS | RESPIRATION RATE: 18 BRPM

## 2018-02-14 DIAGNOSIS — F29 PSYCHOSIS, UNSPECIFIED PSYCHOSIS TYPE (HCC): ICD-10-CM

## 2018-02-14 DIAGNOSIS — R45.851 SUICIDAL IDEATIONS: Primary | ICD-10-CM

## 2018-02-14 DIAGNOSIS — R44.0 AUDITORY HALLUCINATIONS: ICD-10-CM

## 2018-02-14 LAB
ALBUMIN SERPL-MCNC: 4.2 G/DL (ref 3.4–4.8)
ALBUMIN/GLOB SERPL: 1.3 G/DL (ref 1.1–1.8)
ALP SERPL-CCNC: 105 U/L (ref 38–126)
ALT SERPL W P-5'-P-CCNC: 34 U/L (ref 21–72)
AMPHET+METHAMPHET UR QL: POSITIVE
ANION GAP SERPL CALCULATED.3IONS-SCNC: 14 MMOL/L (ref 5–15)
APAP SERPL-MCNC: <10 MCG/ML (ref 10–30)
AST SERPL-CCNC: 23 U/L (ref 17–59)
BARBITURATES UR QL SCN: NEGATIVE
BASOPHILS # BLD AUTO: 0.01 10*3/MM3 (ref 0–0.2)
BASOPHILS NFR BLD AUTO: 0.1 % (ref 0–2)
BENZODIAZ UR QL SCN: POSITIVE
BILIRUB SERPL-MCNC: 0.7 MG/DL (ref 0.2–1.3)
BILIRUB UR QL STRIP: ABNORMAL
BUN BLD-MCNC: 19 MG/DL (ref 7–21)
BUN/CREAT SERPL: 19.4 (ref 7–25)
CALCIUM SPEC-SCNC: 10.3 MG/DL (ref 8.4–10.2)
CANNABINOIDS SERPL QL: NEGATIVE
CHLORIDE SERPL-SCNC: 100 MMOL/L (ref 95–110)
CLARITY UR: CLEAR
CO2 SERPL-SCNC: 24 MMOL/L (ref 22–31)
COCAINE UR QL: NEGATIVE
COLOR UR: ABNORMAL
CREAT BLD-MCNC: 0.98 MG/DL (ref 0.7–1.3)
DEPRECATED RDW RBC AUTO: 39.1 FL (ref 35.1–43.9)
EOSINOPHIL # BLD AUTO: 0.25 10*3/MM3 (ref 0–0.7)
EOSINOPHIL NFR BLD AUTO: 3.3 % (ref 0–7)
ERYTHROCYTE [DISTWIDTH] IN BLOOD BY AUTOMATED COUNT: 12.2 % (ref 11.5–14.5)
ETHANOL BLD-MCNC: <10 MG/DL (ref 0–10)
ETHANOL UR QL: <0.01 %
GFR SERPL CREATININE-BSD FRML MDRD: 80 ML/MIN/1.73 (ref 56–130)
GLOBULIN UR ELPH-MCNC: 3.2 GM/DL (ref 2.3–3.5)
GLUCOSE BLD-MCNC: 106 MG/DL (ref 60–100)
GLUCOSE UR STRIP-MCNC: NEGATIVE MG/DL
HCT VFR BLD AUTO: 49.6 % (ref 39–49)
HGB BLD-MCNC: 17.8 G/DL (ref 13.7–17.3)
HGB UR QL STRIP.AUTO: NEGATIVE
HOLD SPECIMEN: NORMAL
HOLD SPECIMEN: NORMAL
IMM GRANULOCYTES # BLD: 0.02 10*3/MM3 (ref 0–0.02)
IMM GRANULOCYTES NFR BLD: 0.3 % (ref 0–0.5)
KETONES UR QL STRIP: ABNORMAL
LEUKOCYTE ESTERASE UR QL STRIP.AUTO: NEGATIVE
LITHIUM SERPL-SCNC: <0.2 MMOL/L (ref 0.6–1.2)
LYMPHOCYTES # BLD AUTO: 1.55 10*3/MM3 (ref 0.6–4.2)
LYMPHOCYTES NFR BLD AUTO: 20.6 % (ref 10–50)
MCH RBC QN AUTO: 32 PG (ref 26.5–34)
MCHC RBC AUTO-ENTMCNC: 35.9 G/DL (ref 31.5–36.3)
MCV RBC AUTO: 89 FL (ref 80–98)
METHADONE UR QL SCN: NEGATIVE
MONOCYTES # BLD AUTO: 0.51 10*3/MM3 (ref 0–0.9)
MONOCYTES NFR BLD AUTO: 6.8 % (ref 0–12)
NEUTROPHILS # BLD AUTO: 5.2 10*3/MM3 (ref 2–8.6)
NEUTROPHILS NFR BLD AUTO: 68.9 % (ref 37–80)
NITRITE UR QL STRIP: NEGATIVE
OPIATES UR QL: NEGATIVE
OXYCODONE UR QL SCN: NEGATIVE
PH UR STRIP.AUTO: 5.5 [PH] (ref 5–9)
PLATELET # BLD AUTO: 214 10*3/MM3 (ref 150–450)
PMV BLD AUTO: 10.2 FL (ref 8–12)
POTASSIUM BLD-SCNC: 3.8 MMOL/L (ref 3.5–5.1)
PROT SERPL-MCNC: 7.4 G/DL (ref 6.3–8.6)
PROT UR QL STRIP: NEGATIVE
RBC # BLD AUTO: 5.57 10*6/MM3 (ref 4.37–5.74)
SALICYLATES SERPL-MCNC: <1 MG/DL (ref 10–20)
SODIUM BLD-SCNC: 138 MMOL/L (ref 137–145)
SP GR UR STRIP: 1.02 (ref 1–1.03)
UROBILINOGEN UR QL STRIP: ABNORMAL
WBC NRBC COR # BLD: 7.54 10*3/MM3 (ref 3.2–9.8)
WHOLE BLOOD HOLD SPECIMEN: NORMAL
WHOLE BLOOD HOLD SPECIMEN: NORMAL

## 2018-02-14 PROCEDURE — 80307 DRUG TEST PRSMV CHEM ANLYZR: CPT | Performed by: EMERGENCY MEDICINE

## 2018-02-14 PROCEDURE — 99283 EMERGENCY DEPT VISIT LOW MDM: CPT

## 2018-02-14 PROCEDURE — 81003 URINALYSIS AUTO W/O SCOPE: CPT | Performed by: EMERGENCY MEDICINE

## 2018-02-14 PROCEDURE — 85025 COMPLETE CBC W/AUTO DIFF WBC: CPT | Performed by: EMERGENCY MEDICINE

## 2018-02-14 PROCEDURE — 93005 ELECTROCARDIOGRAM TRACING: CPT | Performed by: PSYCHIATRY & NEUROLOGY

## 2018-02-14 PROCEDURE — G0480 DRUG TEST DEF 1-7 CLASSES: HCPCS | Performed by: PSYCHIATRY & NEUROLOGY

## 2018-02-14 PROCEDURE — 93010 ELECTROCARDIOGRAM REPORT: CPT | Performed by: INTERNAL MEDICINE

## 2018-02-14 PROCEDURE — 80053 COMPREHEN METABOLIC PANEL: CPT | Performed by: EMERGENCY MEDICINE

## 2018-02-14 PROCEDURE — 80178 ASSAY OF LITHIUM: CPT | Performed by: EMERGENCY MEDICINE

## 2018-02-14 RX ORDER — CLOPIDOGREL BISULFATE 75 MG/1
75 TABLET ORAL DAILY
Status: DISCONTINUED | OUTPATIENT
Start: 2018-02-14 | End: 2018-03-02 | Stop reason: HOSPADM

## 2018-02-14 RX ORDER — CLONIDINE HYDROCHLORIDE 0.1 MG/1
0.1 TABLET ORAL EVERY 4 HOURS PRN
Status: DISCONTINUED | OUTPATIENT
Start: 2018-02-14 | End: 2018-03-02 | Stop reason: HOSPADM

## 2018-02-14 RX ORDER — MECLIZINE HCL 12.5 MG/1
12.5 TABLET ORAL 2 TIMES DAILY PRN
Status: DISCONTINUED | OUTPATIENT
Start: 2018-02-14 | End: 2018-03-02 | Stop reason: HOSPADM

## 2018-02-14 RX ORDER — HALOPERIDOL 5 MG/1
10 TABLET ORAL 2 TIMES DAILY
Status: DISCONTINUED | OUTPATIENT
Start: 2018-02-14 | End: 2018-02-16

## 2018-02-14 RX ORDER — LOPERAMIDE HYDROCHLORIDE 2 MG/1
2 CAPSULE ORAL 4 TIMES DAILY PRN
Status: DISCONTINUED | OUTPATIENT
Start: 2018-02-14 | End: 2018-03-02 | Stop reason: HOSPADM

## 2018-02-14 RX ORDER — HYDROXYZINE PAMOATE 50 MG/1
50 CAPSULE ORAL EVERY 6 HOURS PRN
Status: DISCONTINUED | OUTPATIENT
Start: 2018-02-14 | End: 2018-02-21

## 2018-02-14 RX ORDER — LITHIUM CARBONATE 450 MG
450 TABLET, EXTENDED RELEASE ORAL EVERY 12 HOURS SCHEDULED
Status: DISCONTINUED | OUTPATIENT
Start: 2018-02-14 | End: 2018-02-22

## 2018-02-14 RX ORDER — TRAZODONE HYDROCHLORIDE 50 MG/1
50 TABLET ORAL NIGHTLY PRN
Status: DISCONTINUED | OUTPATIENT
Start: 2018-02-14 | End: 2018-02-19

## 2018-02-14 RX ORDER — PANTOPRAZOLE SODIUM 40 MG/1
40 TABLET, DELAYED RELEASE ORAL DAILY
Status: DISCONTINUED | OUTPATIENT
Start: 2018-02-14 | End: 2018-03-02 | Stop reason: HOSPADM

## 2018-02-14 RX ORDER — DULOXETIN HYDROCHLORIDE 60 MG/1
60 CAPSULE, DELAYED RELEASE ORAL DAILY
Status: DISCONTINUED | OUTPATIENT
Start: 2018-02-14 | End: 2018-02-22

## 2018-02-14 RX ORDER — DOCUSATE SODIUM 100 MG/1
100 CAPSULE, LIQUID FILLED ORAL 2 TIMES DAILY PRN
Status: DISCONTINUED | OUTPATIENT
Start: 2018-02-14 | End: 2018-03-02 | Stop reason: HOSPADM

## 2018-02-14 RX ORDER — ONDANSETRON 4 MG/1
4 TABLET, FILM COATED ORAL EVERY 6 HOURS PRN
Status: DISCONTINUED | OUTPATIENT
Start: 2018-02-14 | End: 2018-03-02 | Stop reason: HOSPADM

## 2018-02-14 RX ORDER — HYDROCHLOROTHIAZIDE 25 MG/1
25 TABLET ORAL DAILY
Status: DISCONTINUED | OUTPATIENT
Start: 2018-02-14 | End: 2018-03-02 | Stop reason: HOSPADM

## 2018-02-14 RX ORDER — ATORVASTATIN CALCIUM 20 MG/1
20 TABLET, FILM COATED ORAL NIGHTLY
Status: DISCONTINUED | OUTPATIENT
Start: 2018-02-14 | End: 2018-03-02 | Stop reason: HOSPADM

## 2018-02-14 RX ORDER — NICOTINE 21 MG/24HR
1 PATCH, TRANSDERMAL 24 HOURS TRANSDERMAL EVERY 24 HOURS
Status: DISCONTINUED | OUTPATIENT
Start: 2018-02-14 | End: 2018-02-16

## 2018-02-14 RX ORDER — TERAZOSIN 2 MG/1
2 CAPSULE ORAL NIGHTLY
Status: DISCONTINUED | OUTPATIENT
Start: 2018-02-14 | End: 2018-03-02 | Stop reason: HOSPADM

## 2018-02-14 RX ORDER — ASPIRIN 81 MG/1
81 TABLET, CHEWABLE ORAL DAILY
Status: DISCONTINUED | OUTPATIENT
Start: 2018-02-14 | End: 2018-03-02 | Stop reason: HOSPADM

## 2018-02-14 RX ORDER — ACETAMINOPHEN 325 MG/1
650 TABLET ORAL EVERY 4 HOURS PRN
Status: DISCONTINUED | OUTPATIENT
Start: 2018-02-14 | End: 2018-03-02 | Stop reason: HOSPADM

## 2018-02-14 RX ORDER — METOPROLOL SUCCINATE 50 MG/1
50 TABLET, EXTENDED RELEASE ORAL DAILY
Status: DISCONTINUED | OUTPATIENT
Start: 2018-02-14 | End: 2018-03-02 | Stop reason: HOSPADM

## 2018-02-14 RX ORDER — DIPHENHYDRAMINE HCL 50 MG
50 CAPSULE ORAL EVERY 6 HOURS PRN
Status: DISCONTINUED | OUTPATIENT
Start: 2018-02-14 | End: 2018-02-21

## 2018-02-14 RX ORDER — DONEPEZIL HYDROCHLORIDE 10 MG/1
10 TABLET, FILM COATED ORAL NIGHTLY
Status: DISCONTINUED | OUTPATIENT
Start: 2018-02-14 | End: 2018-03-02 | Stop reason: HOSPADM

## 2018-02-14 RX ADMIN — DULOXETINE 60 MG: 60 CAPSULE, DELAYED RELEASE ORAL at 17:45

## 2018-02-14 RX ADMIN — METOPROLOL SUCCINATE 50 MG: 50 TABLET, EXTENDED RELEASE ORAL at 17:45

## 2018-02-14 RX ADMIN — PANTOPRAZOLE SODIUM 40 MG: 40 TABLET, DELAYED RELEASE ORAL at 17:53

## 2018-02-14 RX ADMIN — LITHIUM CARBONATE 450 MG: 450 TABLET, EXTENDED RELEASE ORAL at 20:42

## 2018-02-14 RX ADMIN — ATORVASTATIN CALCIUM 20 MG: 20 TABLET, FILM COATED ORAL at 20:42

## 2018-02-14 RX ADMIN — CLOPIDOGREL BISULFATE 75 MG: 75 TABLET ORAL at 17:52

## 2018-02-14 RX ADMIN — HYDROCHLOROTHIAZIDE 25 MG: 25 TABLET ORAL at 17:52

## 2018-02-14 RX ADMIN — ASPIRIN 81 MG 81 MG: 81 TABLET ORAL at 17:52

## 2018-02-14 RX ADMIN — TERAZOSIN HYDROCHLORIDE 2 MG: 1 CAPSULE ORAL at 20:42

## 2018-02-14 RX ADMIN — DONEPEZIL HYDROCHLORIDE 10 MG: 10 TABLET ORAL at 20:42

## 2018-02-14 RX ADMIN — HALOPERIDOL 10 MG: 5 TABLET ORAL at 20:42

## 2018-02-15 PROBLEM — F39 MOOD DISORDER (HCC): Status: RESOLVED | Noted: 2018-01-19 | Resolved: 2018-02-15

## 2018-02-15 PROBLEM — F29 PSYCHOTIC DISORDER (HCC): Status: RESOLVED | Noted: 2018-01-19 | Resolved: 2018-02-15

## 2018-02-15 PROBLEM — F33.3 SEVERE EPISODE OF RECURRENT MAJOR DEPRESSIVE DISORDER, WITH PSYCHOTIC FEATURES (HCC): Status: ACTIVE | Noted: 2018-02-15

## 2018-02-15 PROCEDURE — 99232 SBSQ HOSP IP/OBS MODERATE 35: CPT | Performed by: FAMILY MEDICINE

## 2018-02-15 PROCEDURE — 90791 PSYCH DIAGNOSTIC EVALUATION: CPT | Performed by: PSYCHIATRY & NEUROLOGY

## 2018-02-15 RX ORDER — TRIAMCINOLONE ACETONIDE 1 MG/G
CREAM TOPICAL EVERY 12 HOURS SCHEDULED
Status: DISCONTINUED | OUTPATIENT
Start: 2018-02-15 | End: 2018-03-02 | Stop reason: HOSPADM

## 2018-02-15 RX ADMIN — DONEPEZIL HYDROCHLORIDE 10 MG: 10 TABLET ORAL at 20:32

## 2018-02-15 RX ADMIN — METOPROLOL SUCCINATE 50 MG: 50 TABLET, EXTENDED RELEASE ORAL at 08:19

## 2018-02-15 RX ADMIN — HALOPERIDOL 10 MG: 5 TABLET ORAL at 08:18

## 2018-02-15 RX ADMIN — TERAZOSIN HYDROCHLORIDE 2 MG: 1 CAPSULE ORAL at 20:32

## 2018-02-15 RX ADMIN — LITHIUM CARBONATE 450 MG: 450 TABLET, EXTENDED RELEASE ORAL at 20:32

## 2018-02-15 RX ADMIN — TRIAMCINOLONE ACETONIDE: 1 CREAM TOPICAL at 22:24

## 2018-02-15 RX ADMIN — DULOXETINE 60 MG: 60 CAPSULE, DELAYED RELEASE ORAL at 08:18

## 2018-02-15 RX ADMIN — ASPIRIN 81 MG 81 MG: 81 TABLET ORAL at 08:19

## 2018-02-15 RX ADMIN — HYDROXYZINE PAMOATE 50 MG: 50 CAPSULE ORAL at 20:31

## 2018-02-15 RX ADMIN — CLOPIDOGREL BISULFATE 75 MG: 75 TABLET ORAL at 08:18

## 2018-02-15 RX ADMIN — ACETAMINOPHEN 650 MG: 325 TABLET ORAL at 01:19

## 2018-02-15 RX ADMIN — HALOPERIDOL 10 MG: 5 TABLET ORAL at 20:31

## 2018-02-15 RX ADMIN — ATORVASTATIN CALCIUM 20 MG: 20 TABLET, FILM COATED ORAL at 20:32

## 2018-02-15 RX ADMIN — PANTOPRAZOLE SODIUM 40 MG: 40 TABLET, DELAYED RELEASE ORAL at 08:18

## 2018-02-15 RX ADMIN — HYDROCHLOROTHIAZIDE 25 MG: 25 TABLET ORAL at 08:18

## 2018-02-15 RX ADMIN — LITHIUM CARBONATE 450 MG: 450 TABLET, EXTENDED RELEASE ORAL at 08:19

## 2018-02-15 RX ADMIN — TRIAMCINOLONE ACETONIDE: 1 CREAM TOPICAL at 10:30

## 2018-02-15 RX ADMIN — TRAZODONE HYDROCHLORIDE 50 MG: 50 TABLET ORAL at 20:32

## 2018-02-16 PROBLEM — F15.20 AMPHETAMINE USE DISORDER, SEVERE (HCC): Status: ACTIVE | Noted: 2018-02-16

## 2018-02-16 PROCEDURE — 25010000002 DIPHENHYDRAMINE PER 50 MG

## 2018-02-16 PROCEDURE — 99232 SBSQ HOSP IP/OBS MODERATE 35: CPT | Performed by: PSYCHIATRY & NEUROLOGY

## 2018-02-16 RX ORDER — DIPHENHYDRAMINE HYDROCHLORIDE 50 MG/ML
50 INJECTION INTRAMUSCULAR; INTRAVENOUS ONCE
Status: COMPLETED | OUTPATIENT
Start: 2018-02-16 | End: 2018-02-16

## 2018-02-16 RX ORDER — BENZTROPINE MESYLATE 1 MG/1
2 TABLET ORAL 2 TIMES DAILY
Status: DISCONTINUED | OUTPATIENT
Start: 2018-02-16 | End: 2018-02-19

## 2018-02-16 RX ORDER — NICOTINE 21 MG/24HR
1 PATCH, TRANSDERMAL 24 HOURS TRANSDERMAL EVERY 24 HOURS
Status: DISCONTINUED | OUTPATIENT
Start: 2018-02-17 | End: 2018-02-18

## 2018-02-16 RX ORDER — DIPHENHYDRAMINE HYDROCHLORIDE 50 MG/ML
INJECTION INTRAMUSCULAR; INTRAVENOUS
Status: COMPLETED
Start: 2018-02-16 | End: 2018-02-16

## 2018-02-16 RX ADMIN — HYDROCHLOROTHIAZIDE 25 MG: 25 TABLET ORAL at 08:36

## 2018-02-16 RX ADMIN — DIPHENHYDRAMINE HYDROCHLORIDE 50 MG: 50 INJECTION INTRAMUSCULAR; INTRAVENOUS at 15:59

## 2018-02-16 RX ADMIN — TERAZOSIN HYDROCHLORIDE 2 MG: 1 CAPSULE ORAL at 21:08

## 2018-02-16 RX ADMIN — BENZTROPINE MESYLATE 2 MG: 1 TABLET ORAL at 21:23

## 2018-02-16 RX ADMIN — ASPIRIN 81 MG 81 MG: 81 TABLET ORAL at 08:35

## 2018-02-16 RX ADMIN — HALOPERIDOL 10 MG: 5 TABLET ORAL at 08:35

## 2018-02-16 RX ADMIN — ATORVASTATIN CALCIUM 20 MG: 20 TABLET, FILM COATED ORAL at 21:08

## 2018-02-16 RX ADMIN — DONEPEZIL HYDROCHLORIDE 10 MG: 10 TABLET ORAL at 21:08

## 2018-02-16 RX ADMIN — TRIAMCINOLONE ACETONIDE: 1 CREAM TOPICAL at 08:35

## 2018-02-16 RX ADMIN — LITHIUM CARBONATE 450 MG: 450 TABLET, EXTENDED RELEASE ORAL at 21:08

## 2018-02-16 RX ADMIN — METOPROLOL SUCCINATE 50 MG: 50 TABLET, EXTENDED RELEASE ORAL at 08:36

## 2018-02-16 RX ADMIN — CLOPIDOGREL BISULFATE 75 MG: 75 TABLET ORAL at 08:36

## 2018-02-16 RX ADMIN — DULOXETINE 60 MG: 60 CAPSULE, DELAYED RELEASE ORAL at 08:36

## 2018-02-16 RX ADMIN — LITHIUM CARBONATE 450 MG: 450 TABLET, EXTENDED RELEASE ORAL at 09:10

## 2018-02-16 RX ADMIN — PANTOPRAZOLE SODIUM 40 MG: 40 TABLET, DELAYED RELEASE ORAL at 08:36

## 2018-02-17 PROCEDURE — 99232 SBSQ HOSP IP/OBS MODERATE 35: CPT | Performed by: PSYCHIATRY & NEUROLOGY

## 2018-02-17 RX ADMIN — DULOXETINE 60 MG: 60 CAPSULE, DELAYED RELEASE ORAL at 08:35

## 2018-02-17 RX ADMIN — LITHIUM CARBONATE 450 MG: 450 TABLET, EXTENDED RELEASE ORAL at 08:35

## 2018-02-17 RX ADMIN — LITHIUM CARBONATE 450 MG: 450 TABLET, EXTENDED RELEASE ORAL at 20:29

## 2018-02-17 RX ADMIN — ATORVASTATIN CALCIUM 20 MG: 20 TABLET, FILM COATED ORAL at 20:29

## 2018-02-17 RX ADMIN — BENZTROPINE MESYLATE 2 MG: 1 TABLET ORAL at 20:29

## 2018-02-17 RX ADMIN — PANTOPRAZOLE SODIUM 40 MG: 40 TABLET, DELAYED RELEASE ORAL at 08:35

## 2018-02-17 RX ADMIN — TRAZODONE HYDROCHLORIDE 50 MG: 50 TABLET ORAL at 20:29

## 2018-02-17 RX ADMIN — BENZTROPINE MESYLATE 2 MG: 1 TABLET ORAL at 08:35

## 2018-02-17 RX ADMIN — TRIAMCINOLONE ACETONIDE: 1 CREAM TOPICAL at 21:00

## 2018-02-17 RX ADMIN — METOPROLOL SUCCINATE 50 MG: 50 TABLET, EXTENDED RELEASE ORAL at 08:35

## 2018-02-17 RX ADMIN — HYDROCHLOROTHIAZIDE 25 MG: 25 TABLET ORAL at 08:35

## 2018-02-17 RX ADMIN — TRIAMCINOLONE ACETONIDE: 1 CREAM TOPICAL at 09:00

## 2018-02-17 RX ADMIN — TERAZOSIN HYDROCHLORIDE 2 MG: 1 CAPSULE ORAL at 20:29

## 2018-02-17 RX ADMIN — ASPIRIN 81 MG 81 MG: 81 TABLET ORAL at 08:35

## 2018-02-17 RX ADMIN — DONEPEZIL HYDROCHLORIDE 10 MG: 10 TABLET ORAL at 20:29

## 2018-02-17 RX ADMIN — CLOPIDOGREL BISULFATE 75 MG: 75 TABLET ORAL at 08:36

## 2018-02-18 PROCEDURE — 99232 SBSQ HOSP IP/OBS MODERATE 35: CPT | Performed by: PSYCHIATRY & NEUROLOGY

## 2018-02-18 PROCEDURE — 63710000001 DIPHENHYDRAMINE PER 50 MG: Performed by: PSYCHIATRY & NEUROLOGY

## 2018-02-18 RX ORDER — OLANZAPINE 5 MG/1
5 TABLET ORAL NIGHTLY
Status: DISCONTINUED | OUTPATIENT
Start: 2018-02-18 | End: 2018-02-21

## 2018-02-18 RX ADMIN — ATORVASTATIN CALCIUM 20 MG: 20 TABLET, FILM COATED ORAL at 21:20

## 2018-02-18 RX ADMIN — DULOXETINE 60 MG: 60 CAPSULE, DELAYED RELEASE ORAL at 08:54

## 2018-02-18 RX ADMIN — ACETAMINOPHEN 650 MG: 325 TABLET ORAL at 09:00

## 2018-02-18 RX ADMIN — LITHIUM CARBONATE 450 MG: 450 TABLET, EXTENDED RELEASE ORAL at 21:20

## 2018-02-18 RX ADMIN — OLANZAPINE 5 MG: 5 TABLET, FILM COATED ORAL at 21:20

## 2018-02-18 RX ADMIN — METOPROLOL SUCCINATE 50 MG: 50 TABLET, EXTENDED RELEASE ORAL at 08:53

## 2018-02-18 RX ADMIN — PANTOPRAZOLE SODIUM 40 MG: 40 TABLET, DELAYED RELEASE ORAL at 08:53

## 2018-02-18 RX ADMIN — TERAZOSIN HYDROCHLORIDE 2 MG: 1 CAPSULE ORAL at 21:20

## 2018-02-18 RX ADMIN — HYDROCHLOROTHIAZIDE 25 MG: 25 TABLET ORAL at 08:53

## 2018-02-18 RX ADMIN — BENZTROPINE MESYLATE 2 MG: 1 TABLET ORAL at 08:53

## 2018-02-18 RX ADMIN — CLOPIDOGREL BISULFATE 75 MG: 75 TABLET ORAL at 08:53

## 2018-02-18 RX ADMIN — DIPHENHYDRAMINE HYDROCHLORIDE 50 MG: 50 CAPSULE ORAL at 23:47

## 2018-02-18 RX ADMIN — TRIAMCINOLONE ACETONIDE: 1 CREAM TOPICAL at 21:20

## 2018-02-18 RX ADMIN — BENZTROPINE MESYLATE 2 MG: 1 TABLET ORAL at 21:20

## 2018-02-18 RX ADMIN — ASPIRIN 81 MG 81 MG: 81 TABLET ORAL at 08:53

## 2018-02-18 RX ADMIN — HYDROXYZINE PAMOATE 50 MG: 50 CAPSULE ORAL at 09:01

## 2018-02-18 RX ADMIN — DONEPEZIL HYDROCHLORIDE 10 MG: 10 TABLET ORAL at 21:20

## 2018-02-18 RX ADMIN — LITHIUM CARBONATE 450 MG: 450 TABLET, EXTENDED RELEASE ORAL at 08:54

## 2018-02-18 RX ADMIN — TRAZODONE HYDROCHLORIDE 50 MG: 50 TABLET ORAL at 21:20

## 2018-02-18 RX ADMIN — TRIAMCINOLONE ACETONIDE: 1 CREAM TOPICAL at 08:57

## 2018-02-19 PROCEDURE — 99232 SBSQ HOSP IP/OBS MODERATE 35: CPT | Performed by: PSYCHIATRY & NEUROLOGY

## 2018-02-19 RX ORDER — BENZTROPINE MESYLATE 1 MG/1
1 TABLET ORAL 2 TIMES DAILY
Status: DISCONTINUED | OUTPATIENT
Start: 2018-02-19 | End: 2018-02-20

## 2018-02-19 RX ORDER — TRAZODONE HYDROCHLORIDE 100 MG/1
100 TABLET ORAL NIGHTLY PRN
Status: DISCONTINUED | OUTPATIENT
Start: 2018-02-19 | End: 2018-02-21

## 2018-02-19 RX ADMIN — ATORVASTATIN CALCIUM 20 MG: 20 TABLET, FILM COATED ORAL at 20:30

## 2018-02-19 RX ADMIN — DONEPEZIL HYDROCHLORIDE 10 MG: 10 TABLET ORAL at 20:31

## 2018-02-19 RX ADMIN — TRAZODONE HYDROCHLORIDE 100 MG: 100 TABLET ORAL at 20:31

## 2018-02-19 RX ADMIN — ASPIRIN 81 MG 81 MG: 81 TABLET ORAL at 08:14

## 2018-02-19 RX ADMIN — OLANZAPINE 5 MG: 5 TABLET, FILM COATED ORAL at 20:30

## 2018-02-19 RX ADMIN — METOPROLOL SUCCINATE 50 MG: 50 TABLET, EXTENDED RELEASE ORAL at 08:13

## 2018-02-19 RX ADMIN — TERAZOSIN HYDROCHLORIDE 2 MG: 1 CAPSULE ORAL at 20:32

## 2018-02-19 RX ADMIN — CLOPIDOGREL BISULFATE 75 MG: 75 TABLET ORAL at 08:13

## 2018-02-19 RX ADMIN — BENZTROPINE MESYLATE 2 MG: 1 TABLET ORAL at 08:14

## 2018-02-19 RX ADMIN — LITHIUM CARBONATE 450 MG: 450 TABLET, EXTENDED RELEASE ORAL at 20:31

## 2018-02-19 RX ADMIN — BENZTROPINE MESYLATE 1 MG: 1 TABLET ORAL at 20:31

## 2018-02-19 RX ADMIN — HYDROCHLOROTHIAZIDE 25 MG: 25 TABLET ORAL at 08:14

## 2018-02-19 RX ADMIN — PANTOPRAZOLE SODIUM 40 MG: 40 TABLET, DELAYED RELEASE ORAL at 08:13

## 2018-02-19 RX ADMIN — LITHIUM CARBONATE 450 MG: 450 TABLET, EXTENDED RELEASE ORAL at 08:13

## 2018-02-19 RX ADMIN — DULOXETINE 60 MG: 60 CAPSULE, DELAYED RELEASE ORAL at 08:14

## 2018-02-20 LAB — LITHIUM SERPL-SCNC: 0.7 MMOL/L (ref 0.6–1.2)

## 2018-02-20 PROCEDURE — 99232 SBSQ HOSP IP/OBS MODERATE 35: CPT | Performed by: PSYCHIATRY & NEUROLOGY

## 2018-02-20 PROCEDURE — 80178 ASSAY OF LITHIUM: CPT | Performed by: PSYCHIATRY & NEUROLOGY

## 2018-02-20 RX ORDER — BENZTROPINE MESYLATE 0.5 MG/1
0.5 TABLET ORAL 2 TIMES DAILY
Status: DISCONTINUED | OUTPATIENT
Start: 2018-02-20 | End: 2018-02-21

## 2018-02-20 RX ADMIN — TERAZOSIN HYDROCHLORIDE 2 MG: 1 CAPSULE ORAL at 20:09

## 2018-02-20 RX ADMIN — METOPROLOL SUCCINATE 50 MG: 50 TABLET, EXTENDED RELEASE ORAL at 08:07

## 2018-02-20 RX ADMIN — OLANZAPINE 5 MG: 5 TABLET, FILM COATED ORAL at 20:09

## 2018-02-20 RX ADMIN — ATORVASTATIN CALCIUM 20 MG: 20 TABLET, FILM COATED ORAL at 20:09

## 2018-02-20 RX ADMIN — DONEPEZIL HYDROCHLORIDE 10 MG: 10 TABLET ORAL at 20:09

## 2018-02-20 RX ADMIN — CLOPIDOGREL BISULFATE 75 MG: 75 TABLET ORAL at 08:07

## 2018-02-20 RX ADMIN — TRAZODONE HYDROCHLORIDE 100 MG: 100 TABLET ORAL at 20:09

## 2018-02-20 RX ADMIN — BENZTROPINE MESYLATE 1 MG: 1 TABLET ORAL at 08:07

## 2018-02-20 RX ADMIN — TRIAMCINOLONE ACETONIDE: 1 CREAM TOPICAL at 20:00

## 2018-02-20 RX ADMIN — DULOXETINE 60 MG: 60 CAPSULE, DELAYED RELEASE ORAL at 08:07

## 2018-02-20 RX ADMIN — LITHIUM CARBONATE 450 MG: 450 TABLET, EXTENDED RELEASE ORAL at 08:07

## 2018-02-20 RX ADMIN — LITHIUM CARBONATE 450 MG: 450 TABLET, EXTENDED RELEASE ORAL at 20:09

## 2018-02-20 RX ADMIN — ASPIRIN 81 MG 81 MG: 81 TABLET ORAL at 08:07

## 2018-02-20 RX ADMIN — TRIAMCINOLONE ACETONIDE: 1 CREAM TOPICAL at 08:07

## 2018-02-20 RX ADMIN — HYDROCHLOROTHIAZIDE 25 MG: 25 TABLET ORAL at 08:07

## 2018-02-20 RX ADMIN — PANTOPRAZOLE SODIUM 40 MG: 40 TABLET, DELAYED RELEASE ORAL at 08:07

## 2018-02-20 RX ADMIN — BENZTROPINE MESYLATE 0.5 MG: 0.5 TABLET ORAL at 20:09

## 2018-02-21 PROCEDURE — 25010000002 HALOPERIDOL LACTATE PER 5 MG: Performed by: PSYCHIATRY & NEUROLOGY

## 2018-02-21 PROCEDURE — 99232 SBSQ HOSP IP/OBS MODERATE 35: CPT | Performed by: PSYCHIATRY & NEUROLOGY

## 2018-02-21 PROCEDURE — 63710000001 DIPHENHYDRAMINE PER 50 MG: Performed by: PSYCHIATRY & NEUROLOGY

## 2018-02-21 PROCEDURE — 25010000002 DIPHENHYDRAMINE PER 50 MG: Performed by: PSYCHIATRY & NEUROLOGY

## 2018-02-21 RX ORDER — LANOLIN ALCOHOL/MO/W.PET/CERES
6 CREAM (GRAM) TOPICAL NIGHTLY
Status: DISCONTINUED | OUTPATIENT
Start: 2018-02-21 | End: 2018-02-23

## 2018-02-21 RX ORDER — DIPHENHYDRAMINE HYDROCHLORIDE 50 MG/ML
50 INJECTION INTRAMUSCULAR; INTRAVENOUS NIGHTLY PRN
Status: DISCONTINUED | OUTPATIENT
Start: 2018-02-21 | End: 2018-02-21

## 2018-02-21 RX ORDER — DIPHENHYDRAMINE HYDROCHLORIDE 50 MG/ML
50 INJECTION INTRAMUSCULAR; INTRAVENOUS NIGHTLY PRN
Status: DISCONTINUED | OUTPATIENT
Start: 2018-02-21 | End: 2018-03-02 | Stop reason: HOSPADM

## 2018-02-21 RX ORDER — DIPHENHYDRAMINE HCL 50 MG
50 CAPSULE ORAL EVERY 6 HOURS PRN
Status: DISCONTINUED | OUTPATIENT
Start: 2018-02-21 | End: 2018-03-02 | Stop reason: HOSPADM

## 2018-02-21 RX ORDER — HALOPERIDOL 5 MG/ML
5 INJECTION INTRAMUSCULAR NIGHTLY PRN
Status: DISCONTINUED | OUTPATIENT
Start: 2018-02-21 | End: 2018-03-02 | Stop reason: HOSPADM

## 2018-02-21 RX ORDER — HALOPERIDOL 5 MG/ML
5 INJECTION INTRAMUSCULAR NIGHTLY PRN
Status: DISCONTINUED | OUTPATIENT
Start: 2018-02-21 | End: 2018-02-21

## 2018-02-21 RX ORDER — MIRTAZAPINE 15 MG/1
15 TABLET, FILM COATED ORAL NIGHTLY
Status: DISCONTINUED | OUTPATIENT
Start: 2018-02-21 | End: 2018-02-22

## 2018-02-21 RX ORDER — RISPERIDONE 1 MG/1
2 TABLET ORAL EVERY 12 HOURS SCHEDULED
Status: DISCONTINUED | OUTPATIENT
Start: 2018-02-21 | End: 2018-02-25

## 2018-02-21 RX ADMIN — LITHIUM CARBONATE 450 MG: 450 TABLET, EXTENDED RELEASE ORAL at 08:21

## 2018-02-21 RX ADMIN — DULOXETINE 60 MG: 60 CAPSULE, DELAYED RELEASE ORAL at 08:21

## 2018-02-21 RX ADMIN — METOPROLOL SUCCINATE 50 MG: 50 TABLET, EXTENDED RELEASE ORAL at 08:21

## 2018-02-21 RX ADMIN — TERAZOSIN HYDROCHLORIDE 2 MG: 1 CAPSULE ORAL at 20:16

## 2018-02-21 RX ADMIN — HALOPERIDOL LACTATE 5 MG: 5 INJECTION, SOLUTION INTRAMUSCULAR at 19:56

## 2018-02-21 RX ADMIN — ASPIRIN 81 MG 81 MG: 81 TABLET ORAL at 08:21

## 2018-02-21 RX ADMIN — BENZTROPINE MESYLATE 0.5 MG: 0.5 TABLET ORAL at 08:21

## 2018-02-21 RX ADMIN — HYDROCHLOROTHIAZIDE 25 MG: 25 TABLET ORAL at 08:21

## 2018-02-21 RX ADMIN — DONEPEZIL HYDROCHLORIDE 10 MG: 10 TABLET ORAL at 20:16

## 2018-02-21 RX ADMIN — PANTOPRAZOLE SODIUM 40 MG: 40 TABLET, DELAYED RELEASE ORAL at 08:21

## 2018-02-21 RX ADMIN — DIPHENHYDRAMINE HYDROCHLORIDE 50 MG: 50 CAPSULE ORAL at 07:33

## 2018-02-21 RX ADMIN — RISPERIDONE 2 MG: 1 TABLET ORAL at 12:44

## 2018-02-21 RX ADMIN — RISPERIDONE 2 MG: 1 TABLET ORAL at 20:16

## 2018-02-21 RX ADMIN — MELATONIN TAB 3 MG 6 MG: 3 TAB at 20:15

## 2018-02-21 RX ADMIN — LITHIUM CARBONATE 450 MG: 450 TABLET, EXTENDED RELEASE ORAL at 20:16

## 2018-02-21 RX ADMIN — HYDROXYZINE PAMOATE 50 MG: 50 CAPSULE ORAL at 13:58

## 2018-02-21 RX ADMIN — ATORVASTATIN CALCIUM 20 MG: 20 TABLET, FILM COATED ORAL at 20:16

## 2018-02-21 RX ADMIN — ACETAMINOPHEN 650 MG: 325 TABLET ORAL at 13:58

## 2018-02-21 RX ADMIN — CLOPIDOGREL BISULFATE 75 MG: 75 TABLET ORAL at 08:21

## 2018-02-21 RX ADMIN — HYDROXYZINE PAMOATE 50 MG: 50 CAPSULE ORAL at 07:33

## 2018-02-21 RX ADMIN — MIRTAZAPINE 15 MG: 15 TABLET, FILM COATED ORAL at 20:16

## 2018-02-21 RX ADMIN — TRIAMCINOLONE ACETONIDE: 1 CREAM TOPICAL at 08:24

## 2018-02-21 RX ADMIN — DIPHENHYDRAMINE HYDROCHLORIDE 50 MG: 50 INJECTION INTRAMUSCULAR; INTRAVENOUS at 19:56

## 2018-02-21 RX ADMIN — DIPHENHYDRAMINE HYDROCHLORIDE 50 MG: 50 CAPSULE ORAL at 13:59

## 2018-02-22 ENCOUNTER — APPOINTMENT (OUTPATIENT)
Dept: MRI IMAGING | Facility: HOSPITAL | Age: 53
End: 2018-02-22

## 2018-02-22 LAB
ALBUMIN SERPL-MCNC: 4.4 G/DL (ref 3.4–4.8)
ALBUMIN/GLOB SERPL: 1.4 G/DL (ref 1.1–1.8)
ALP SERPL-CCNC: 89 U/L (ref 38–126)
ALT SERPL W P-5'-P-CCNC: 39 U/L (ref 21–72)
ANION GAP SERPL CALCULATED.3IONS-SCNC: 9 MMOL/L (ref 5–15)
AST SERPL-CCNC: 29 U/L (ref 17–59)
BASOPHILS # BLD AUTO: 0.03 10*3/MM3 (ref 0–0.2)
BASOPHILS NFR BLD AUTO: 0.3 % (ref 0–2)
BILIRUB SERPL-MCNC: 1 MG/DL (ref 0.2–1.3)
BUN BLD-MCNC: 19 MG/DL (ref 7–21)
BUN/CREAT SERPL: 20 (ref 7–25)
CALCIUM SPEC-SCNC: 11.9 MG/DL (ref 8.4–10.2)
CHLORIDE SERPL-SCNC: 98 MMOL/L (ref 95–110)
CO2 SERPL-SCNC: 35 MMOL/L (ref 22–31)
CREAT BLD-MCNC: 0.95 MG/DL (ref 0.7–1.3)
DEPRECATED RDW RBC AUTO: 42.1 FL (ref 35.1–43.9)
EOSINOPHIL # BLD AUTO: 0.31 10*3/MM3 (ref 0–0.7)
EOSINOPHIL NFR BLD AUTO: 3.2 % (ref 0–7)
ERYTHROCYTE [DISTWIDTH] IN BLOOD BY AUTOMATED COUNT: 12.7 % (ref 11.5–14.5)
GFR SERPL CREATININE-BSD FRML MDRD: 83 ML/MIN/1.73 (ref 60–130)
GLOBULIN UR ELPH-MCNC: 3.1 GM/DL (ref 2.3–3.5)
GLUCOSE BLD-MCNC: 89 MG/DL (ref 60–100)
HCT VFR BLD AUTO: 47.7 % (ref 39–49)
HGB BLD-MCNC: 16.6 G/DL (ref 13.7–17.3)
IMM GRANULOCYTES # BLD: 0.02 10*3/MM3 (ref 0–0.02)
IMM GRANULOCYTES NFR BLD: 0.2 % (ref 0–0.5)
LITHIUM SERPL-SCNC: 0.6 MMOL/L (ref 0.6–1.2)
LYMPHOCYTES # BLD AUTO: 1.64 10*3/MM3 (ref 0.6–4.2)
LYMPHOCYTES NFR BLD AUTO: 16.9 % (ref 10–50)
MCH RBC QN AUTO: 31.6 PG (ref 26.5–34)
MCHC RBC AUTO-ENTMCNC: 34.8 G/DL (ref 31.5–36.3)
MCV RBC AUTO: 90.9 FL (ref 80–98)
MONOCYTES # BLD AUTO: 0.63 10*3/MM3 (ref 0–0.9)
MONOCYTES NFR BLD AUTO: 6.5 % (ref 0–12)
NEUTROPHILS # BLD AUTO: 7.09 10*3/MM3 (ref 2–8.6)
NEUTROPHILS NFR BLD AUTO: 72.9 % (ref 37–80)
NRBC BLD MANUAL-RTO: 0 /100 WBC (ref 0–0)
PLATELET # BLD AUTO: 152 10*3/MM3 (ref 150–450)
PMV BLD AUTO: 10.4 FL (ref 8–12)
POTASSIUM BLD-SCNC: 3.8 MMOL/L (ref 3.5–5.1)
PROT SERPL-MCNC: 7.5 G/DL (ref 6.3–8.6)
RBC # BLD AUTO: 5.25 10*6/MM3 (ref 4.37–5.74)
SODIUM BLD-SCNC: 142 MMOL/L (ref 137–145)
WBC NRBC COR # BLD: 9.72 10*3/MM3 (ref 3.2–9.8)

## 2018-02-22 PROCEDURE — 99232 SBSQ HOSP IP/OBS MODERATE 35: CPT | Performed by: PSYCHIATRY & NEUROLOGY

## 2018-02-22 PROCEDURE — 80053 COMPREHEN METABOLIC PANEL: CPT | Performed by: PSYCHIATRY & NEUROLOGY

## 2018-02-22 PROCEDURE — 80178 ASSAY OF LITHIUM: CPT | Performed by: PSYCHIATRY & NEUROLOGY

## 2018-02-22 PROCEDURE — 25010000002 GADOTERIDOL PER 1 ML: Performed by: PSYCHIATRY & NEUROLOGY

## 2018-02-22 PROCEDURE — 70553 MRI BRAIN STEM W/O & W/DYE: CPT

## 2018-02-22 PROCEDURE — 85025 COMPLETE CBC W/AUTO DIFF WBC: CPT | Performed by: PSYCHIATRY & NEUROLOGY

## 2018-02-22 PROCEDURE — A9576 INJ PROHANCE MULTIPACK: HCPCS | Performed by: PSYCHIATRY & NEUROLOGY

## 2018-02-22 PROCEDURE — 63710000001 DIPHENHYDRAMINE PER 50 MG: Performed by: PSYCHIATRY & NEUROLOGY

## 2018-02-22 RX ORDER — DULOXETIN HYDROCHLORIDE 30 MG/1
30 CAPSULE, DELAYED RELEASE ORAL DAILY
Status: DISCONTINUED | OUTPATIENT
Start: 2018-02-23 | End: 2018-02-25

## 2018-02-22 RX ORDER — LITHIUM CARBONATE 300 MG/1
300 TABLET, FILM COATED, EXTENDED RELEASE ORAL EVERY 12 HOURS SCHEDULED
Status: DISCONTINUED | OUTPATIENT
Start: 2018-02-22 | End: 2018-02-23

## 2018-02-22 RX ORDER — MIRTAZAPINE 15 MG/1
15 TABLET, FILM COATED ORAL NIGHTLY PRN
Status: DISCONTINUED | OUTPATIENT
Start: 2018-02-22 | End: 2018-02-23

## 2018-02-22 RX ADMIN — PANTOPRAZOLE SODIUM 40 MG: 40 TABLET, DELAYED RELEASE ORAL at 08:03

## 2018-02-22 RX ADMIN — ASPIRIN 81 MG 81 MG: 81 TABLET ORAL at 08:03

## 2018-02-22 RX ADMIN — MIRTAZAPINE 15 MG: 15 TABLET, FILM COATED ORAL at 20:01

## 2018-02-22 RX ADMIN — METOPROLOL SUCCINATE 50 MG: 50 TABLET, EXTENDED RELEASE ORAL at 08:04

## 2018-02-22 RX ADMIN — DULOXETINE 60 MG: 60 CAPSULE, DELAYED RELEASE ORAL at 08:03

## 2018-02-22 RX ADMIN — DIPHENHYDRAMINE HYDROCHLORIDE 50 MG: 50 CAPSULE ORAL at 20:18

## 2018-02-22 RX ADMIN — RISPERIDONE 2 MG: 1 TABLET ORAL at 08:04

## 2018-02-22 RX ADMIN — MELATONIN TAB 3 MG 6 MG: 3 TAB at 20:01

## 2018-02-22 RX ADMIN — TERAZOSIN HYDROCHLORIDE 2 MG: 1 CAPSULE ORAL at 20:01

## 2018-02-22 RX ADMIN — RISPERIDONE 2 MG: 1 TABLET ORAL at 20:00

## 2018-02-22 RX ADMIN — GADOTERIDOL 20 ML: 279.3 INJECTION, SOLUTION INTRAVENOUS at 17:00

## 2018-02-22 RX ADMIN — DONEPEZIL HYDROCHLORIDE 10 MG: 10 TABLET ORAL at 20:02

## 2018-02-22 RX ADMIN — TRIAMCINOLONE ACETONIDE: 1 CREAM TOPICAL at 20:02

## 2018-02-22 RX ADMIN — CLOPIDOGREL BISULFATE 75 MG: 75 TABLET ORAL at 08:03

## 2018-02-22 RX ADMIN — LITHIUM CARBONATE 300 MG: 300 TABLET, FILM COATED, EXTENDED RELEASE ORAL at 20:02

## 2018-02-22 RX ADMIN — HYDROCHLOROTHIAZIDE 25 MG: 25 TABLET ORAL at 08:04

## 2018-02-22 RX ADMIN — LITHIUM CARBONATE 450 MG: 450 TABLET, EXTENDED RELEASE ORAL at 08:03

## 2018-02-22 RX ADMIN — ATORVASTATIN CALCIUM 20 MG: 20 TABLET, FILM COATED ORAL at 20:01

## 2018-02-23 PROCEDURE — 63710000001 DIPHENHYDRAMINE PER 50 MG: Performed by: PSYCHIATRY & NEUROLOGY

## 2018-02-23 PROCEDURE — 99232 SBSQ HOSP IP/OBS MODERATE 35: CPT | Performed by: PSYCHIATRY & NEUROLOGY

## 2018-02-23 RX ORDER — QUETIAPINE FUMARATE 25 MG/1
50 TABLET, FILM COATED ORAL NIGHTLY
Status: DISCONTINUED | OUTPATIENT
Start: 2018-02-23 | End: 2018-02-25

## 2018-02-23 RX ADMIN — ATORVASTATIN CALCIUM 20 MG: 20 TABLET, FILM COATED ORAL at 20:42

## 2018-02-23 RX ADMIN — QUETIAPINE FUMARATE 50 MG: 25 TABLET ORAL at 20:42

## 2018-02-23 RX ADMIN — ASPIRIN 81 MG 81 MG: 81 TABLET ORAL at 08:28

## 2018-02-23 RX ADMIN — TRIAMCINOLONE ACETONIDE: 1 CREAM TOPICAL at 21:00

## 2018-02-23 RX ADMIN — TERAZOSIN HYDROCHLORIDE 2 MG: 1 CAPSULE ORAL at 20:42

## 2018-02-23 RX ADMIN — DIPHENHYDRAMINE HYDROCHLORIDE 50 MG: 50 CAPSULE ORAL at 20:41

## 2018-02-23 RX ADMIN — RISPERIDONE 2 MG: 1 TABLET ORAL at 08:28

## 2018-02-23 RX ADMIN — RISPERIDONE 2 MG: 1 TABLET ORAL at 20:42

## 2018-02-23 RX ADMIN — DONEPEZIL HYDROCHLORIDE 10 MG: 10 TABLET ORAL at 20:42

## 2018-02-23 RX ADMIN — CLOPIDOGREL BISULFATE 75 MG: 75 TABLET ORAL at 08:28

## 2018-02-23 RX ADMIN — HYDROCHLOROTHIAZIDE 25 MG: 25 TABLET ORAL at 08:28

## 2018-02-23 RX ADMIN — DULOXETINE HYDROCHLORIDE 30 MG: 30 CAPSULE, DELAYED RELEASE ORAL at 08:28

## 2018-02-23 RX ADMIN — PANTOPRAZOLE SODIUM 40 MG: 40 TABLET, DELAYED RELEASE ORAL at 08:29

## 2018-02-23 RX ADMIN — TRIAMCINOLONE ACETONIDE 1 APPLICATION: 1 CREAM TOPICAL at 08:28

## 2018-02-23 RX ADMIN — LITHIUM CARBONATE 300 MG: 300 TABLET, FILM COATED, EXTENDED RELEASE ORAL at 08:28

## 2018-02-23 RX ADMIN — METOPROLOL SUCCINATE 50 MG: 50 TABLET, EXTENDED RELEASE ORAL at 08:28

## 2018-02-24 PROCEDURE — 99232 SBSQ HOSP IP/OBS MODERATE 35: CPT | Performed by: PSYCHIATRY & NEUROLOGY

## 2018-02-24 RX ADMIN — PANTOPRAZOLE SODIUM 40 MG: 40 TABLET, DELAYED RELEASE ORAL at 08:12

## 2018-02-24 RX ADMIN — RISPERIDONE 2 MG: 1 TABLET ORAL at 08:12

## 2018-02-24 RX ADMIN — TERAZOSIN HYDROCHLORIDE 2 MG: 1 CAPSULE ORAL at 21:10

## 2018-02-24 RX ADMIN — ASPIRIN 81 MG 81 MG: 81 TABLET ORAL at 08:12

## 2018-02-24 RX ADMIN — DULOXETINE HYDROCHLORIDE 30 MG: 30 CAPSULE, DELAYED RELEASE ORAL at 08:12

## 2018-02-24 RX ADMIN — DONEPEZIL HYDROCHLORIDE 10 MG: 10 TABLET ORAL at 21:11

## 2018-02-24 RX ADMIN — ATORVASTATIN CALCIUM 20 MG: 20 TABLET, FILM COATED ORAL at 21:10

## 2018-02-24 RX ADMIN — RISPERIDONE 2 MG: 1 TABLET ORAL at 21:10

## 2018-02-24 RX ADMIN — HYDROCHLOROTHIAZIDE 25 MG: 25 TABLET ORAL at 08:12

## 2018-02-24 RX ADMIN — METOPROLOL SUCCINATE 50 MG: 50 TABLET, EXTENDED RELEASE ORAL at 08:12

## 2018-02-24 RX ADMIN — TRIAMCINOLONE ACETONIDE 1 APPLICATION: 1 CREAM TOPICAL at 08:12

## 2018-02-24 RX ADMIN — CLOPIDOGREL BISULFATE 75 MG: 75 TABLET ORAL at 08:12

## 2018-02-24 RX ADMIN — QUETIAPINE FUMARATE 50 MG: 25 TABLET ORAL at 21:10

## 2018-02-25 PROCEDURE — 99232 SBSQ HOSP IP/OBS MODERATE 35: CPT | Performed by: PSYCHIATRY & NEUROLOGY

## 2018-02-25 RX ORDER — HALOPERIDOL 5 MG/1
5 TABLET ORAL 2 TIMES DAILY
Status: DISCONTINUED | OUTPATIENT
Start: 2018-02-25 | End: 2018-02-27

## 2018-02-25 RX ORDER — DULOXETIN HYDROCHLORIDE 60 MG/1
60 CAPSULE, DELAYED RELEASE ORAL DAILY
Status: DISCONTINUED | OUTPATIENT
Start: 2018-02-26 | End: 2018-02-27

## 2018-02-25 RX ORDER — QUETIAPINE FUMARATE 100 MG/1
100 TABLET, FILM COATED ORAL NIGHTLY
Status: DISCONTINUED | OUTPATIENT
Start: 2018-02-25 | End: 2018-02-27

## 2018-02-25 RX ORDER — BENZTROPINE MESYLATE 0.5 MG/1
0.5 TABLET ORAL EVERY 12 HOURS SCHEDULED
Status: DISCONTINUED | OUTPATIENT
Start: 2018-02-25 | End: 2018-02-27

## 2018-02-25 RX ADMIN — ATORVASTATIN CALCIUM 20 MG: 20 TABLET, FILM COATED ORAL at 21:16

## 2018-02-25 RX ADMIN — DULOXETINE HYDROCHLORIDE 30 MG: 30 CAPSULE, DELAYED RELEASE ORAL at 08:12

## 2018-02-25 RX ADMIN — BENZTROPINE MESYLATE 0.5 MG: 0.5 TABLET ORAL at 21:16

## 2018-02-25 RX ADMIN — DONEPEZIL HYDROCHLORIDE 10 MG: 10 TABLET ORAL at 21:16

## 2018-02-25 RX ADMIN — METOPROLOL SUCCINATE 50 MG: 50 TABLET, EXTENDED RELEASE ORAL at 08:13

## 2018-02-25 RX ADMIN — TERAZOSIN HYDROCHLORIDE 2 MG: 1 CAPSULE ORAL at 21:16

## 2018-02-25 RX ADMIN — PANTOPRAZOLE SODIUM 40 MG: 40 TABLET, DELAYED RELEASE ORAL at 08:12

## 2018-02-25 RX ADMIN — ASPIRIN 81 MG 81 MG: 81 TABLET ORAL at 08:12

## 2018-02-25 RX ADMIN — HALOPERIDOL 5 MG: 5 TABLET ORAL at 21:16

## 2018-02-25 RX ADMIN — QUETIAPINE 100 MG: 100 TABLET ORAL at 21:16

## 2018-02-25 RX ADMIN — HYDROCHLOROTHIAZIDE 25 MG: 25 TABLET ORAL at 08:12

## 2018-02-25 RX ADMIN — CLOPIDOGREL BISULFATE 75 MG: 75 TABLET ORAL at 08:12

## 2018-02-25 RX ADMIN — TRIAMCINOLONE ACETONIDE: 1 CREAM TOPICAL at 08:11

## 2018-02-25 RX ADMIN — RISPERIDONE 2 MG: 1 TABLET ORAL at 08:12

## 2018-02-26 LAB — AMPHETAMINES UR QL: POSITIVE

## 2018-02-26 PROCEDURE — 63710000001 DIPHENHYDRAMINE PER 50 MG: Performed by: PSYCHIATRY & NEUROLOGY

## 2018-02-26 PROCEDURE — 99232 SBSQ HOSP IP/OBS MODERATE 35: CPT | Performed by: PSYCHIATRY & NEUROLOGY

## 2018-02-26 RX ADMIN — BENZTROPINE MESYLATE 0.5 MG: 0.5 TABLET ORAL at 20:12

## 2018-02-26 RX ADMIN — DONEPEZIL HYDROCHLORIDE 10 MG: 10 TABLET ORAL at 20:12

## 2018-02-26 RX ADMIN — PANTOPRAZOLE SODIUM 40 MG: 40 TABLET, DELAYED RELEASE ORAL at 08:12

## 2018-02-26 RX ADMIN — HALOPERIDOL 5 MG: 5 TABLET ORAL at 08:12

## 2018-02-26 RX ADMIN — ATORVASTATIN CALCIUM 20 MG: 20 TABLET, FILM COATED ORAL at 20:12

## 2018-02-26 RX ADMIN — CLOPIDOGREL BISULFATE 75 MG: 75 TABLET ORAL at 08:12

## 2018-02-26 RX ADMIN — DIPHENHYDRAMINE HYDROCHLORIDE 50 MG: 50 CAPSULE ORAL at 00:54

## 2018-02-26 RX ADMIN — BENZTROPINE MESYLATE 0.5 MG: 0.5 TABLET ORAL at 08:12

## 2018-02-26 RX ADMIN — TERAZOSIN HYDROCHLORIDE 2 MG: 1 CAPSULE ORAL at 20:12

## 2018-02-26 RX ADMIN — DULOXETINE 60 MG: 60 CAPSULE, DELAYED RELEASE ORAL at 08:12

## 2018-02-26 RX ADMIN — HALOPERIDOL 5 MG: 5 TABLET ORAL at 20:12

## 2018-02-26 RX ADMIN — METOPROLOL SUCCINATE 50 MG: 50 TABLET, EXTENDED RELEASE ORAL at 08:12

## 2018-02-26 RX ADMIN — ASPIRIN 81 MG 81 MG: 81 TABLET ORAL at 08:12

## 2018-02-26 RX ADMIN — QUETIAPINE 100 MG: 100 TABLET ORAL at 20:12

## 2018-02-26 RX ADMIN — HYDROCHLOROTHIAZIDE 25 MG: 25 TABLET ORAL at 08:12

## 2018-02-27 PROCEDURE — 99232 SBSQ HOSP IP/OBS MODERATE 35: CPT | Performed by: PSYCHIATRY & NEUROLOGY

## 2018-02-27 PROCEDURE — 63710000001 DIPHENHYDRAMINE PER 50 MG: Performed by: PSYCHIATRY & NEUROLOGY

## 2018-02-27 RX ORDER — ZOLPIDEM TARTRATE 5 MG/1
5 TABLET ORAL NIGHTLY
Status: DISCONTINUED | OUTPATIENT
Start: 2018-02-27 | End: 2018-02-27

## 2018-02-27 RX ORDER — LURASIDONE HYDROCHLORIDE 40 MG/1
80 TABLET, FILM COATED ORAL
Status: DISCONTINUED | OUTPATIENT
Start: 2018-02-27 | End: 2018-02-28

## 2018-02-27 RX ADMIN — CLOPIDOGREL BISULFATE 75 MG: 75 TABLET ORAL at 09:42

## 2018-02-27 RX ADMIN — LURASIDONE HYDROCHLORIDE 80 MG: 40 TABLET, FILM COATED ORAL at 17:48

## 2018-02-27 RX ADMIN — DULOXETINE 60 MG: 60 CAPSULE, DELAYED RELEASE ORAL at 09:42

## 2018-02-27 RX ADMIN — TERAZOSIN HYDROCHLORIDE 2 MG: 1 CAPSULE ORAL at 20:41

## 2018-02-27 RX ADMIN — HALOPERIDOL 5 MG: 5 TABLET ORAL at 09:43

## 2018-02-27 RX ADMIN — PANTOPRAZOLE SODIUM 40 MG: 40 TABLET, DELAYED RELEASE ORAL at 09:42

## 2018-02-27 RX ADMIN — METOPROLOL SUCCINATE 50 MG: 50 TABLET, EXTENDED RELEASE ORAL at 09:42

## 2018-02-27 RX ADMIN — TRIAMCINOLONE ACETONIDE: 1 CREAM TOPICAL at 16:32

## 2018-02-27 RX ADMIN — ASPIRIN 81 MG 81 MG: 81 TABLET ORAL at 09:42

## 2018-02-27 RX ADMIN — DIPHENHYDRAMINE HYDROCHLORIDE 50 MG: 50 CAPSULE ORAL at 20:41

## 2018-02-27 RX ADMIN — ATORVASTATIN CALCIUM 20 MG: 20 TABLET, FILM COATED ORAL at 20:41

## 2018-02-27 RX ADMIN — DONEPEZIL HYDROCHLORIDE 10 MG: 10 TABLET ORAL at 20:41

## 2018-02-27 RX ADMIN — BENZTROPINE MESYLATE 0.5 MG: 0.5 TABLET ORAL at 09:43

## 2018-02-27 RX ADMIN — HYDROCHLOROTHIAZIDE 25 MG: 25 TABLET ORAL at 09:42

## 2018-02-28 PROCEDURE — 99232 SBSQ HOSP IP/OBS MODERATE 35: CPT | Performed by: PSYCHIATRY & NEUROLOGY

## 2018-02-28 PROCEDURE — 63710000001 DIPHENHYDRAMINE PER 50 MG: Performed by: PSYCHIATRY & NEUROLOGY

## 2018-02-28 RX ORDER — LURASIDONE HYDROCHLORIDE 40 MG/1
120 TABLET, FILM COATED ORAL
Status: DISCONTINUED | OUTPATIENT
Start: 2018-02-28 | End: 2018-03-02 | Stop reason: HOSPADM

## 2018-02-28 RX ADMIN — TERAZOSIN HYDROCHLORIDE 2 MG: 1 CAPSULE ORAL at 20:43

## 2018-02-28 RX ADMIN — DULOXETINE HYDROCHLORIDE 90 MG: 30 CAPSULE, DELAYED RELEASE ORAL at 08:09

## 2018-02-28 RX ADMIN — LURASIDONE HYDROCHLORIDE 120 MG: 40 TABLET, FILM COATED ORAL at 17:13

## 2018-02-28 RX ADMIN — ATORVASTATIN CALCIUM 20 MG: 20 TABLET, FILM COATED ORAL at 20:44

## 2018-02-28 RX ADMIN — PANTOPRAZOLE SODIUM 40 MG: 40 TABLET, DELAYED RELEASE ORAL at 08:09

## 2018-02-28 RX ADMIN — METOPROLOL SUCCINATE 50 MG: 50 TABLET, EXTENDED RELEASE ORAL at 08:09

## 2018-02-28 RX ADMIN — DONEPEZIL HYDROCHLORIDE 10 MG: 10 TABLET ORAL at 20:43

## 2018-02-28 RX ADMIN — DIPHENHYDRAMINE HYDROCHLORIDE 50 MG: 50 CAPSULE ORAL at 20:43

## 2018-02-28 RX ADMIN — CLOPIDOGREL BISULFATE 75 MG: 75 TABLET ORAL at 08:09

## 2018-02-28 RX ADMIN — ASPIRIN 81 MG 81 MG: 81 TABLET ORAL at 08:09

## 2018-02-28 RX ADMIN — HYDROCHLOROTHIAZIDE 25 MG: 25 TABLET ORAL at 08:09

## 2018-03-01 PROCEDURE — 99232 SBSQ HOSP IP/OBS MODERATE 35: CPT | Performed by: PSYCHIATRY & NEUROLOGY

## 2018-03-01 RX ADMIN — LURASIDONE HYDROCHLORIDE 120 MG: 40 TABLET, FILM COATED ORAL at 17:05

## 2018-03-01 RX ADMIN — PANTOPRAZOLE SODIUM 40 MG: 40 TABLET, DELAYED RELEASE ORAL at 08:42

## 2018-03-01 RX ADMIN — ASPIRIN 81 MG 81 MG: 81 TABLET ORAL at 08:42

## 2018-03-01 RX ADMIN — TERAZOSIN HYDROCHLORIDE 2 MG: 1 CAPSULE ORAL at 20:21

## 2018-03-01 RX ADMIN — HYDROCHLOROTHIAZIDE 25 MG: 25 TABLET ORAL at 08:42

## 2018-03-01 RX ADMIN — ATORVASTATIN CALCIUM 20 MG: 20 TABLET, FILM COATED ORAL at 20:21

## 2018-03-01 RX ADMIN — METOPROLOL SUCCINATE 50 MG: 50 TABLET, EXTENDED RELEASE ORAL at 08:42

## 2018-03-01 RX ADMIN — CLOPIDOGREL BISULFATE 75 MG: 75 TABLET ORAL at 08:42

## 2018-03-01 RX ADMIN — DULOXETINE HYDROCHLORIDE 90 MG: 30 CAPSULE, DELAYED RELEASE ORAL at 08:42

## 2018-03-01 RX ADMIN — DONEPEZIL HYDROCHLORIDE 10 MG: 10 TABLET ORAL at 20:21

## 2018-03-01 NOTE — PROGRESS NOTES
3/1/2018    Chief Complaint: suicidal ideation and hallucinations    Subjective:  Patient is a 53 y.o. male who was hospitalized for suicidal ideation and hallucinations.   Patient today was seen in treatment team.  He reported that he felt a bit better.  There appeared to be a slight brightening of his affect.  He spoke about his plans to visit his friends in [place] and [place].  He was more future oriented.  He noted that his AVH were better.  He notes that he does not have any SI and notes that he does not want to go to hell by attempting suicide.    Objective     Vital Signs    Temp:  [97.8 °F (36.6 °C)-98.5 °F (36.9 °C)] 97.8 °F (36.6 °C)  Heart Rate:  [90-91] 91  Resp:  [14-20] 14  BP: (114-126)/(74-78) 114/78    Physical Exam:   General Appearance: alert, appears stated age and cooperative,  Hygiene:   good  Gait & Station: Normal  Musculoskeletal: No tremors or abnormal involuntary movements    Mental Status Exam:   Cooperation:  Cooperative  Eye Contact:  Good  Psychomotor Behavior:  Appropriate  Mood: Improving  Affect:  normal and improving  Speech:  Normal  Thought Process:  Coherent  Associations: Goal Directed  Thought Content:     Normal   Suicidal:  None   Homicidal:  None   Hallucinations:  None   Delusion:  None  Cognitive Functioning:   Consciousness: awake, alert and oriented  Reliability:  fair  Insight:  Fair  Judgement:  Fair  Impulse Control:  Fair    Lab Results (last 24 hours)     ** No results found for the last 24 hours. **        Imaging Results (last 24 hours)     ** No results found for the last 24 hours. **          Medicine:   Current Facility-Administered Medications:   •  acetaminophen (TYLENOL) tablet 650 mg, 650 mg, Oral, Q4H PRN, Gerry Trinidad MD, 650 mg at 02/21/18 1358  •  aspirin chewable tablet 81 mg, 81 mg, Oral, Daily, Gerry Trinidad MD, 81 mg at 03/01/18 0842  •  atorvastatin (LIPITOR) tablet 20 mg, 20 mg, Oral, Nightly, Gerry rTinidad MD, 20 mg at 02/28/18  2044  •  CloNIDine (CATAPRES) tablet 0.1 mg, 0.1 mg, Oral, Q4H PRN, Gerry Trinidad MD  •  clopidogrel (PLAVIX) tablet 75 mg, 75 mg, Oral, Daily, Gerry Trinidad MD, 75 mg at 03/01/18 0842  •  diphenhydrAMINE (BENADRYL) capsule 50 mg, 50 mg, Oral, Q6H PRN, Gerry Trinidad MD, 50 mg at 02/28/18 2043  •  haloperidol lactate (HALDOL) injection 5 mg, 5 mg, Intramuscular, Nightly PRN, 5 mg at 02/21/18 1956 **AND** diphenhydrAMINE (BENADRYL) injection 50 mg, 50 mg, Intramuscular, Nightly PRN, Gerry Trinidad MD, 50 mg at 02/21/18 1956  •  docusate sodium (COLACE) capsule 100 mg, 100 mg, Oral, BID PRN, Gerry Trinidad MD  •  donepezil (ARICEPT) tablet 10 mg, 10 mg, Oral, Nightly, Gerry Trinidad MD, 10 mg at 02/28/18 2043  •  DULoxetine (CYMBALTA) DR capsule 90 mg, 90 mg, Oral, Daily, Gerry Trinidad MD, 90 mg at 03/01/18 0842  •  hydrochlorothiazide (HYDRODIURIL) tablet 25 mg, 25 mg, Oral, Daily, Gerry Trinidad MD, 25 mg at 03/01/18 0842  •  loperamide (IMODIUM) capsule 2 mg, 2 mg, Oral, 4x Daily PRN, Gerry Trinidad MD  •  lurasidone (LATUDA) tablet 120 mg, 120 mg, Oral, Daily With Dinner, Gerry Trinidad MD, 120 mg at 02/28/18 1713  •  magnesium hydroxide (MILK OF MAGNESIA) suspension 2400 mg/10mL 10 mL, 10 mL, Oral, Daily PRN, Gerry Trinidad MD  •  meclizine (ANTIVERT) tablet 12.5 mg, 12.5 mg, Oral, BID PRN, Gerry Trinidad MD  •  metoprolol succinate XL (TOPROL-XL) 24 hr tablet 50 mg, 50 mg, Oral, Daily, Gerry Trinidad MD, 50 mg at 03/01/18 0842  •  ondansetron (ZOFRAN) tablet 4 mg, 4 mg, Oral, Q6H PRN, Gerry Trinidad MD  •  pantoprazole (PROTONIX) EC tablet 40 mg, 40 mg, Oral, Daily, Gerry Trinidad MD, 40 mg at 03/01/18 0842  •  terazosin (HYTRIN) capsule 2 mg, 2 mg, Oral, Nightly, Gerry Trinidad MD, 2 mg at 02/28/18 2043  •  triamcinolone (KENALOG) 0.1 % cream, , Topical, Q12H, Isidro Murdock MD    Diagnoses/Assessment:   Principal Problem:    Severe episode of  recurrent major depressive disorder, with psychotic features  Active Problems:    Dementia in Alzheimer's disease with early onset    Amphetamine use disorder, severe      Treatment Plan:    1) Will continue care for the patient on the behavioral health unit at [facility] to ensure patient safety.  2) Will continue to provide treatment with the unit milieu, activities, therapies and psychopharmacological management.  3) Patient to be placed on or continued on  Q15 minute checks  and Suicide precautions.  4) Pertinent medical issues: No active medical issues.  5) Will order following labs: none  6) Will make the following medication changes: Will continue the latuda and cymbalta and aricept.  7) Will continue discharge planning as appropriate for patient.  8) Psychotherapy provided: none    Treatment plan and medication risks and benefits discussed with: Patient    Gerry Trinidad MD  03/01/18  3:09 PM

## 2018-03-01 NOTE — NURSING NOTE
Behavior   Anxiety: Feeling worried  Depression: difficulty concentrating and hopelessness  Pain  0  AVH   no  S/I   no  H/I   no    Affect   flat    Note: Patient is up in the hallway, he states he did not sleep well.  He says that he doesn't know when he will ever go home.  He remains to be flat affect and quiet spoken.        Intervention  Instructed in medication usage and effects  Medications administered as ordered  Encouraged to verbalize needs      Response  Verbalized understanding   Did patient take medications as ordered yes   Did patient interact with assessment?  yes     Plan  Will monitor for safety  Will monitor every 15 minutes as ordered  Will evaluate and promote the plan of care

## 2018-03-01 NOTE — NURSING NOTE
Behavior   Anxiety: Feeling anxious  Depression: anxiety  Pain   0  AVH   yes   S/I   no  H/I   no  Affect   Flat    Patient found resting in bed awake.  Patient reports little AVH today. Patient continues to have flat affect. Medications administered. Patient refuses snack. Needs met at this time, will continue to monitor.    Intervention  Medications reviewed and administered  Assessment complete    Response  Verbalized understanding   Took medications  Interacted with assessment    Plan  Will promote and reinforce current treatment plan and encourage involvement in care plan goals.   Will provide for safe, calm, quiet environment.  Will promote open communication with staff and foster a trusting/working relationship with patient.   Will promote participation in groups and therapies and independent reflection.

## 2018-03-01 NOTE — PLAN OF CARE
Problem: Risk for Self Injury/Neglect  Goal: Verbalize thoughts and feelings associated with:  Outcome: Ongoing (interventions implemented as appropriate)    Goal: Refrain from harming self  Outcome: Ongoing (interventions implemented as appropriate)    Goal: Attend and participate in unit activities, including therapeutic, recreational, and educational groups  Outcome: Ongoing (interventions implemented as appropriate)

## 2018-03-01 NOTE — PLAN OF CARE
Problem: BH Patient Care Overview (Adult)  Goal: Interdisciplinary Rounds/Family Conference  Outcome: Ongoing (interventions implemented as appropriate)

## 2018-03-01 NOTE — PLAN OF CARE
Problem: BH Patient Care Overview (Adult)  Goal: Individualization and Mutuality  Outcome: Ongoing (interventions implemented as appropriate)    Goal: Discharge Needs Assessment  Outcome: Ongoing (interventions implemented as appropriate)      Problem: BH Overarching Goals  Goal: Adheres to Safety Considerations for Self and Others  Outcome: Ongoing (interventions implemented as appropriate)    Goal: Optimized Coping Skills in Response to Life Stressors  Outcome: Ongoing (interventions implemented as appropriate)    Goal: Develops/Participates in Therapeutic [place]ie to Support Successful Transition  Outcome: Ongoing (interventions implemented as appropriate)      Problem: Depression  Goal: Treatment Goal: Demonstrate behavioral control of depressive symptoms, verbalize feelings of improved mood/affect, and adopt new coping skills prior to discharge  Outcome: Ongoing (interventions implemented as appropriate)    Goal: Verbalize thoughts and feelings associated with:  Outcome: Ongoing (interventions implemented as appropriate)    Goal: Refrain from harming self  Outcome: Ongoing (interventions implemented as appropriate)    Goal: Refrain from isolation  Outcome: Ongoing (interventions implemented as appropriate)    Goal: Refrain from self-neglect  Outcome: Ongoing (interventions implemented as appropriate)    Goal: Attend and participate in unit activities, including therapeutic, recreational, and educational groups  Outcome: Ongoing (interventions implemented as appropriate)      Problem: Risk for Self Injury/Neglect  Goal: Treatment Goal: Remain safe during length of stay, learn and adopt new coping skills, and be free of self-injurious ideation, impulses and acts at the time of discharge  Outcome: Ongoing (interventions implemented as appropriate)    Goal: Verbalize thoughts and feelings associated with:  Outcome: Ongoing (interventions implemented as appropriate)    Goal: Refrain from harming self  Outcome:  Ongoing (interventions implemented as appropriate)    Goal: Attend and participate in unit activities, including therapeutic, recreational, and educational groups  Outcome: Ongoing (interventions implemented as appropriate)    Goal: Recognize maladaptive responses and adopt new coping mechanisms  Outcome: Ongoing (interventions implemented as appropriate)

## 2018-03-02 ENCOUNTER — HOSPITAL ENCOUNTER (EMERGENCY)
Facility: HOSPITAL | Age: 53
Discharge: PSYCHIATRIC OR SUBSTANCE USE FACILITY | End: 2018-03-03
Attending: EMERGENCY MEDICINE

## 2018-03-02 VITALS
DIASTOLIC BLOOD PRESSURE: 78 MMHG | WEIGHT: 199.96 LBS | BODY MASS INDEX: 26.5 KG/M2 | RESPIRATION RATE: 16 BRPM | HEIGHT: 73 IN | SYSTOLIC BLOOD PRESSURE: 115 MMHG | HEART RATE: 103 BPM | OXYGEN SATURATION: 94 % | TEMPERATURE: 97.7 F

## 2018-03-02 VITALS
HEART RATE: 83 BPM | OXYGEN SATURATION: 98 % | TEMPERATURE: 98.2 F | BODY MASS INDEX: 26.51 KG/M2 | HEIGHT: 73 IN | DIASTOLIC BLOOD PRESSURE: 86 MMHG | SYSTOLIC BLOOD PRESSURE: 131 MMHG | WEIGHT: 200 LBS | RESPIRATION RATE: 18 BRPM

## 2018-03-02 DIAGNOSIS — F32.A DEPRESSION, UNSPECIFIED DEPRESSION TYPE: ICD-10-CM

## 2018-03-02 DIAGNOSIS — R45.851 SUICIDAL IDEATIONS: Primary | ICD-10-CM

## 2018-03-02 PROBLEM — F15.10 AMPHETAMINE ABUSE (HCC): Status: ACTIVE | Noted: 2018-02-16

## 2018-03-02 LAB
ALBUMIN SERPL-MCNC: 4.4 G/DL (ref 3.4–4.8)
ALBUMIN/GLOB SERPL: 1.5 G/DL (ref 1.1–1.8)
ALP SERPL-CCNC: 114 U/L (ref 38–126)
ALT SERPL W P-5'-P-CCNC: 52 U/L (ref 21–72)
AMPHET+METHAMPHET UR QL: NEGATIVE
ANION GAP SERPL CALCULATED.3IONS-SCNC: 10 MMOL/L (ref 5–15)
APAP SERPL-MCNC: <10 MCG/ML (ref 10–30)
AST SERPL-CCNC: 30 U/L (ref 17–59)
BARBITURATES UR QL SCN: NEGATIVE
BASOPHILS # BLD AUTO: 0.02 10*3/MM3 (ref 0–0.2)
BASOPHILS NFR BLD AUTO: 0.2 % (ref 0–2)
BENZODIAZ UR QL SCN: NEGATIVE
BILIRUB SERPL-MCNC: 0.8 MG/DL (ref 0.2–1.3)
BUN BLD-MCNC: 12 MG/DL (ref 7–21)
BUN/CREAT SERPL: 14 (ref 7–25)
CALCIUM SPEC-SCNC: 10.9 MG/DL (ref 8.4–10.2)
CANNABINOIDS SERPL QL: NEGATIVE
CHLORIDE SERPL-SCNC: 96 MMOL/L (ref 95–110)
CO2 SERPL-SCNC: 27 MMOL/L (ref 22–31)
COCAINE UR QL: NEGATIVE
CREAT BLD-MCNC: 0.86 MG/DL (ref 0.7–1.3)
DEPRECATED RDW RBC AUTO: 39.9 FL (ref 35.1–43.9)
EOSINOPHIL # BLD AUTO: 0.23 10*3/MM3 (ref 0–0.7)
EOSINOPHIL NFR BLD AUTO: 1.9 % (ref 0–7)
ERYTHROCYTE [DISTWIDTH] IN BLOOD BY AUTOMATED COUNT: 12.5 % (ref 11.5–14.5)
ETHANOL BLD-MCNC: <10 MG/DL (ref 0–10)
ETHANOL UR QL: <0.01 %
GFR SERPL CREATININE-BSD FRML MDRD: 93 ML/MIN/1.73 (ref 56–130)
GLOBULIN UR ELPH-MCNC: 3 GM/DL (ref 2.3–3.5)
GLUCOSE BLD-MCNC: 107 MG/DL (ref 60–100)
HCT VFR BLD AUTO: 46.8 % (ref 39–49)
HGB BLD-MCNC: 17 G/DL (ref 13.7–17.3)
HOLD SPECIMEN: NORMAL
HOLD SPECIMEN: NORMAL
IMM GRANULOCYTES # BLD: 0.02 10*3/MM3 (ref 0–0.02)
IMM GRANULOCYTES NFR BLD: 0.2 % (ref 0–0.5)
LYMPHOCYTES # BLD AUTO: 1.62 10*3/MM3 (ref 0.6–4.2)
LYMPHOCYTES NFR BLD AUTO: 13.7 % (ref 10–50)
MCH RBC QN AUTO: 31.8 PG (ref 26.5–34)
MCHC RBC AUTO-ENTMCNC: 36.3 G/DL (ref 31.5–36.3)
MCV RBC AUTO: 87.6 FL (ref 80–98)
METHADONE UR QL SCN: NEGATIVE
MONOCYTES # BLD AUTO: 0.72 10*3/MM3 (ref 0–0.9)
MONOCYTES NFR BLD AUTO: 6.1 % (ref 0–12)
NEUTROPHILS # BLD AUTO: 9.19 10*3/MM3 (ref 2–8.6)
NEUTROPHILS NFR BLD AUTO: 77.9 % (ref 37–80)
OPIATES UR QL: NEGATIVE
OXYCODONE UR QL SCN: NEGATIVE
PLATELET # BLD AUTO: 178 10*3/MM3 (ref 150–450)
PMV BLD AUTO: 10.6 FL (ref 8–12)
POTASSIUM BLD-SCNC: 3.3 MMOL/L (ref 3.5–5.1)
PROT SERPL-MCNC: 7.4 G/DL (ref 6.3–8.6)
RBC # BLD AUTO: 5.34 10*6/MM3 (ref 4.37–5.74)
SALICYLATES SERPL-MCNC: <1 MG/DL (ref 10–20)
SODIUM BLD-SCNC: 133 MMOL/L (ref 137–145)
T4 FREE SERPL-MCNC: 1.17 NG/DL (ref 0.78–2.19)
TSH SERPL DL<=0.05 MIU/L-ACNC: 2.77 MIU/ML (ref 0.46–4.68)
WBC NRBC COR # BLD: 11.8 10*3/MM3 (ref 3.2–9.8)
WHOLE BLOOD HOLD SPECIMEN: NORMAL
WHOLE BLOOD HOLD SPECIMEN: NORMAL

## 2018-03-02 PROCEDURE — 85025 COMPLETE CBC W/AUTO DIFF WBC: CPT | Performed by: EMERGENCY MEDICINE

## 2018-03-02 PROCEDURE — 80307 DRUG TEST PRSMV CHEM ANLYZR: CPT | Performed by: EMERGENCY MEDICINE

## 2018-03-02 PROCEDURE — 80053 COMPREHEN METABOLIC PANEL: CPT | Performed by: EMERGENCY MEDICINE

## 2018-03-02 PROCEDURE — 99239 HOSP IP/OBS DSCHRG MGMT >30: CPT | Performed by: PSYCHIATRY & NEUROLOGY

## 2018-03-02 PROCEDURE — 84439 ASSAY OF FREE THYROXINE: CPT | Performed by: EMERGENCY MEDICINE

## 2018-03-02 PROCEDURE — 84443 ASSAY THYROID STIM HORMONE: CPT | Performed by: EMERGENCY MEDICINE

## 2018-03-02 RX ORDER — LURASIDONE HYDROCHLORIDE 120 MG/1
120 TABLET, FILM COATED ORAL
Qty: 30 TABLET | Refills: 0 | Status: CANCELLED | OUTPATIENT
Start: 2018-03-02

## 2018-03-02 RX ORDER — DULOXETIN HYDROCHLORIDE 30 MG/1
90 CAPSULE, DELAYED RELEASE ORAL DAILY
Qty: 90 CAPSULE | Refills: 0 | Status: ON HOLD | OUTPATIENT
Start: 2018-03-02 | End: 2018-03-08

## 2018-03-02 RX ORDER — TRIAMCINOLONE ACETONIDE 1 MG/G
CREAM TOPICAL EVERY 12 HOURS SCHEDULED
Qty: 15 G | Refills: 0 | Status: ON HOLD | OUTPATIENT
Start: 2018-03-02 | End: 2018-03-08

## 2018-03-02 RX ORDER — QUETIAPINE FUMARATE 300 MG/1
600 TABLET, FILM COATED ORAL NIGHTLY
Qty: 60 TABLET | Refills: 0 | Status: ON HOLD | OUTPATIENT
Start: 2018-03-02 | End: 2018-03-08

## 2018-03-02 RX ADMIN — ASPIRIN 81 MG 81 MG: 81 TABLET ORAL at 08:09

## 2018-03-02 RX ADMIN — TRIAMCINOLONE ACETONIDE: 1 CREAM TOPICAL at 09:00

## 2018-03-02 RX ADMIN — HYDROCHLOROTHIAZIDE 25 MG: 25 TABLET ORAL at 08:09

## 2018-03-02 RX ADMIN — DULOXETINE HYDROCHLORIDE 90 MG: 30 CAPSULE, DELAYED RELEASE ORAL at 08:09

## 2018-03-02 RX ADMIN — PANTOPRAZOLE SODIUM 40 MG: 40 TABLET, DELAYED RELEASE ORAL at 08:09

## 2018-03-02 RX ADMIN — METOPROLOL SUCCINATE 50 MG: 50 TABLET, EXTENDED RELEASE ORAL at 08:09

## 2018-03-02 RX ADMIN — CLOPIDOGREL BISULFATE 75 MG: 75 TABLET ORAL at 08:09

## 2018-03-02 NOTE — NURSING NOTE
Patient went to bed after med pass at approximately 2020. Patient woke up twice to look at the time then went back to bed. Patient slept the rest of the night.

## 2018-03-02 NOTE — PLAN OF CARE
Problem: Depression  Goal: Refrain from harming self  Outcome: Ongoing (interventions implemented as appropriate)    Goal: Refrain from isolation  Outcome: Ongoing (interventions implemented as appropriate)      Problem: Risk for Self Injury/Neglect  Goal: Verbalize thoughts and feelings associated with:  Outcome: Ongoing (interventions implemented as appropriate)    Goal: Attend and participate in unit activities, including therapeutic, recreational, and educational groups  Outcome: Ongoing (interventions implemented as appropriate)    Goal: Recognize maladaptive responses and adopt new coping mechanisms  Outcome: Ongoing (interventions implemented as appropriate)

## 2018-03-02 NOTE — PLAN OF CARE
Problem: Risk for Self Injury/Neglect  Goal: Attend and participate in unit activities, including therapeutic, recreational, and educational groups  Outcome: Ongoing (interventions implemented as appropriate)    Goal: Recognize maladaptive responses and adopt new coping mechanisms  Outcome: Ongoing (interventions implemented as appropriate)

## 2018-03-02 NOTE — NURSING NOTE
Patient is resting in bed he states he is ready to go today. He has a  Home in [place] to go home to but states he may go to [place] and see his friends.  He is not having any SI/HI/AVH.  He is compliant with medication. He is interactive during the interviewing.  He states he did not sleep well last night.  Although night shift states he is resting at night.    He is alert today.  He is not sure on the date.    He is able to make needs known.  Continue to monitor and provide for needs.

## 2018-03-02 NOTE — NURSING NOTE
"Behavior   Anxiety: no s/s  Depression: impaired memory and disturbed sleep  Pain  0  AVH   no  S/I   no  H/I   no    Affect   calm and pleasant    Note: Patient in room resting. Patient had appropriate eye contact. Patient speech was normal rate, rhythm, and volume. Patient had no visual abnormal movements. Patient reports bowel movement today and ok appetite. Patient states he \"is going to try to get a good nights sleep.\"      Intervention  Instructed in medication usage and effects  Medications administered as ordered  Encouraged to verbalize needs      Response  Verbalized understanding   Did patient take medications as ordered yes   Did patient interact with assessment?  yes     Plan  Will monitor for safety  Will monitor every 15 minutes as ordered  Will evaluate and promote the plan of care    "

## 2018-03-02 NOTE — SIGNIFICANT NOTE
03/02/18 1305   Individual Counseling   Time Session Began 1245   Time Session Ended 1305   Total Time (minutes) 20   Topic Safety/dc plan   Session Detail CSW met with pt 1:1 and reviewed safety/dc plan   Patient Response Pt presented in the dayroom, casually dressed, alert and oriented x3. Mood is fair and cooperative, affect remains somewhat blunted. Pt does appear to be more alert and cognitively present today than in days past. Pt also appears more hopeful, AEB verbalizing plan to dc home and surrond himself with supportive people. Pt denies active thoughts of SI, HI, and denies AVH. Pt does not appear to have any paranoia or be responding to any internal stimuli. Pt appears to have reached his new baseline. Non compliance is major concern for pt at this time. CSW educated pt on importance of remaining compliant with outpt tx and medication. Pt voiced understanding. Follow up scheduled with [facility] for therapy and med management. Pt educated on Crisis Hotline, advised himto call as needed. Pts insight judgement are fair. BPRS was completed upon dc.

## 2018-03-02 NOTE — PLAN OF CARE
Problem: Depression  Goal: Treatment Goal: Demonstrate behavioral control of depressive symptoms, verbalize feelings of improved mood/affect, and adopt new coping skills prior to discharge  Outcome: Ongoing (interventions implemented as appropriate)  Patient reports feeling better today. Patient still avoids social interactions.  Goal: Verbalize thoughts and feelings associated with:  Outcome: Ongoing (interventions implemented as appropriate)    Goal: Refrain from harming self  Outcome: Ongoing (interventions implemented as appropriate)    Goal: Refrain from isolation  Outcome: Ongoing (interventions implemented as appropriate)    Goal: Refrain from self-neglect  Outcome: Ongoing (interventions implemented as appropriate)    Goal: Attend and participate in unit activities, including therapeutic, recreational, and educational groups  Outcome: Ongoing (interventions implemented as appropriate)      Problem: Risk for Self Injury/Neglect  Goal: Treatment Goal: Remain safe during length of stay, learn and adopt new coping skills, and be free of self-injurious ideation, impulses and acts at the time of discharge  Outcome: Ongoing (interventions implemented as appropriate)  Patient denies thoughts of self harm. Patient reports feeling better today.  Goal: Verbalize thoughts and feelings associated with:  Outcome: Ongoing (interventions implemented as appropriate)    Goal: Refrain from harming self  Outcome: Ongoing (interventions implemented as appropriate)    Goal: Attend and participate in unit activities, including therapeutic, recreational, and educational groups  Outcome: Ongoing (interventions implemented as appropriate)    Goal: Recognize maladaptive responses and adopt new coping mechanisms  Outcome: Ongoing (interventions implemented as appropriate)
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
## 2018-03-02 NOTE — NURSING NOTE
Patient is discharged home with on his own.  He has a truck in the parking lot. He is discharged with instructions and appointments.

## 2018-03-03 ENCOUNTER — HOSPITAL ENCOUNTER (INPATIENT)
Facility: HOSPITAL | Age: 53
LOS: 5 days | Discharge: HOME | End: 2018-03-08
Attending: PSYCHIATRY & NEUROLOGY | Admitting: PSYCHIATRY & NEUROLOGY

## 2018-03-03 LAB
ARTICHOKE IGE QN: 66 MG/DL (ref 1–129)
CHOLEST SERPL-MCNC: 128 MG/DL (ref 0–199)
GLUCOSE P FAST SERPL-MCNC: 102 MG/DL (ref 60–110)
HDLC SERPL-MCNC: 41 MG/DL (ref 60–200)
LDLC/HDLC SERPL: 1.69 {RATIO} (ref 0–3.55)
TRIGL SERPL-MCNC: 88 MG/DL (ref 20–199)

## 2018-03-03 PROCEDURE — 99222 1ST HOSP IP/OBS MODERATE 55: CPT | Performed by: FAMILY MEDICINE

## 2018-03-03 PROCEDURE — 82947 ASSAY GLUCOSE BLOOD QUANT: CPT | Performed by: PSYCHIATRY & NEUROLOGY

## 2018-03-03 PROCEDURE — 80061 LIPID PANEL: CPT | Performed by: PSYCHIATRY & NEUROLOGY

## 2018-03-03 RX ORDER — ALUMINA, MAGNESIA, AND SIMETHICONE 2400; 2400; 240 MG/30ML; MG/30ML; MG/30ML
15 SUSPENSION ORAL EVERY 6 HOURS PRN
Status: DISCONTINUED | OUTPATIENT
Start: 2018-03-03 | End: 2018-03-08 | Stop reason: HOSPADM

## 2018-03-03 RX ORDER — MECLIZINE HCL 12.5 MG/1
12.5 TABLET ORAL 3 TIMES DAILY PRN
Status: DISCONTINUED | OUTPATIENT
Start: 2018-03-03 | End: 2018-03-08 | Stop reason: HOSPADM

## 2018-03-03 RX ORDER — ATORVASTATIN CALCIUM 10 MG/1
10 TABLET, FILM COATED ORAL NIGHTLY
Status: CANCELLED | OUTPATIENT
Start: 2018-03-03

## 2018-03-03 RX ORDER — QUETIAPINE FUMARATE 300 MG/1
600 TABLET, FILM COATED ORAL NIGHTLY
Status: CANCELLED | OUTPATIENT
Start: 2018-03-03

## 2018-03-03 RX ORDER — TERAZOSIN 2 MG/1
2 CAPSULE ORAL NIGHTLY
Status: DISCONTINUED | OUTPATIENT
Start: 2018-03-03 | End: 2018-03-08 | Stop reason: HOSPADM

## 2018-03-03 RX ORDER — ATORVASTATIN CALCIUM 10 MG/1
10 TABLET, FILM COATED ORAL NIGHTLY
Status: DISCONTINUED | OUTPATIENT
Start: 2018-03-03 | End: 2018-03-03

## 2018-03-03 RX ORDER — LOPERAMIDE HYDROCHLORIDE 2 MG/1
2 CAPSULE ORAL 4 TIMES DAILY PRN
Status: CANCELLED | OUTPATIENT
Start: 2018-03-03

## 2018-03-03 RX ORDER — CLOPIDOGREL BISULFATE 75 MG/1
75 TABLET ORAL DAILY
Status: CANCELLED | OUTPATIENT
Start: 2018-03-03

## 2018-03-03 RX ORDER — POTASSIUM CHLORIDE 750 MG/1
30 CAPSULE, EXTENDED RELEASE ORAL
Status: DISPENSED | OUTPATIENT
Start: 2018-03-03 | End: 2018-03-06

## 2018-03-03 RX ORDER — ASPIRIN 81 MG/1
81 TABLET, CHEWABLE ORAL DAILY
Status: CANCELLED | OUTPATIENT
Start: 2018-03-03

## 2018-03-03 RX ORDER — PANTOPRAZOLE SODIUM 20 MG/1
20 TABLET, DELAYED RELEASE ORAL
Status: CANCELLED | OUTPATIENT
Start: 2018-03-03

## 2018-03-03 RX ORDER — DIPHENHYDRAMINE HCL 50 MG
50 CAPSULE ORAL EVERY 6 HOURS PRN
Status: CANCELLED | OUTPATIENT
Start: 2018-03-03

## 2018-03-03 RX ORDER — PRAZOSIN HYDROCHLORIDE 1 MG/1
2 CAPSULE ORAL NIGHTLY
Status: CANCELLED | OUTPATIENT
Start: 2018-03-03

## 2018-03-03 RX ORDER — HYDROCHLOROTHIAZIDE 25 MG/1
25 TABLET ORAL DAILY
Status: DISCONTINUED | OUTPATIENT
Start: 2018-03-03 | End: 2018-03-08 | Stop reason: HOSPADM

## 2018-03-03 RX ORDER — ATORVASTATIN CALCIUM 20 MG/1
20 TABLET, FILM COATED ORAL NIGHTLY
Status: DISCONTINUED | OUTPATIENT
Start: 2018-03-03 | End: 2018-03-08 | Stop reason: HOSPADM

## 2018-03-03 RX ORDER — ACETAMINOPHEN 325 MG/1
650 TABLET ORAL EVERY 4 HOURS PRN
Status: CANCELLED | OUTPATIENT
Start: 2018-03-03

## 2018-03-03 RX ORDER — ALUMINA, MAGNESIA, AND SIMETHICONE 2400; 2400; 240 MG/30ML; MG/30ML; MG/30ML
15 SUSPENSION ORAL EVERY 6 HOURS PRN
Status: CANCELLED | OUTPATIENT
Start: 2018-03-03

## 2018-03-03 RX ORDER — METOPROLOL SUCCINATE 50 MG/1
50 TABLET, EXTENDED RELEASE ORAL
Status: DISCONTINUED | OUTPATIENT
Start: 2018-03-03 | End: 2018-03-08 | Stop reason: HOSPADM

## 2018-03-03 RX ORDER — DONEPEZIL HYDROCHLORIDE 10 MG/1
10 TABLET, FILM COATED ORAL NIGHTLY
Status: CANCELLED | OUTPATIENT
Start: 2018-03-03

## 2018-03-03 RX ORDER — LOPERAMIDE HYDROCHLORIDE 2 MG/1
2 CAPSULE ORAL 4 TIMES DAILY PRN
Status: DISCONTINUED | OUTPATIENT
Start: 2018-03-03 | End: 2018-03-08 | Stop reason: HOSPADM

## 2018-03-03 RX ORDER — HYDROXYZINE PAMOATE 25 MG/1
50 CAPSULE ORAL EVERY 6 HOURS PRN
Status: CANCELLED | OUTPATIENT
Start: 2018-03-03

## 2018-03-03 RX ORDER — ACETAMINOPHEN 325 MG/1
650 TABLET ORAL EVERY 4 HOURS PRN
Status: DISCONTINUED | OUTPATIENT
Start: 2018-03-03 | End: 2018-03-08 | Stop reason: HOSPADM

## 2018-03-03 RX ORDER — CLOPIDOGREL BISULFATE 75 MG/1
75 TABLET ORAL DAILY
Status: DISCONTINUED | OUTPATIENT
Start: 2018-03-03 | End: 2018-03-08 | Stop reason: HOSPADM

## 2018-03-03 RX ORDER — PANTOPRAZOLE SODIUM 20 MG/1
20 TABLET, DELAYED RELEASE ORAL
Status: DISCONTINUED | OUTPATIENT
Start: 2018-03-03 | End: 2018-03-05

## 2018-03-03 RX ORDER — TRAZODONE HYDROCHLORIDE 50 MG/1
50 TABLET ORAL NIGHTLY PRN
Status: CANCELLED | OUTPATIENT
Start: 2018-03-03

## 2018-03-03 RX ORDER — ASPIRIN 81 MG/1
81 TABLET, CHEWABLE ORAL DAILY
Status: DISCONTINUED | OUTPATIENT
Start: 2018-03-03 | End: 2018-03-08 | Stop reason: HOSPADM

## 2018-03-03 RX ORDER — METOPROLOL SUCCINATE 50 MG/1
50 TABLET, EXTENDED RELEASE ORAL
Status: CANCELLED | OUTPATIENT
Start: 2018-03-03

## 2018-03-03 RX ORDER — HYDROXYZINE PAMOATE 50 MG/1
50 CAPSULE ORAL EVERY 6 HOURS PRN
Status: DISCONTINUED | OUTPATIENT
Start: 2018-03-03 | End: 2018-03-08 | Stop reason: HOSPADM

## 2018-03-03 RX ORDER — PRAZOSIN HYDROCHLORIDE 1 MG/1
2 CAPSULE ORAL NIGHTLY
Status: DISCONTINUED | OUTPATIENT
Start: 2018-03-03 | End: 2018-03-03

## 2018-03-03 RX ORDER — TRAZODONE HYDROCHLORIDE 50 MG/1
50 TABLET ORAL NIGHTLY PRN
Status: DISCONTINUED | OUTPATIENT
Start: 2018-03-03 | End: 2018-03-08 | Stop reason: HOSPADM

## 2018-03-03 RX ORDER — DIPHENHYDRAMINE HCL 50 MG
50 CAPSULE ORAL EVERY 6 HOURS PRN
Status: DISCONTINUED | OUTPATIENT
Start: 2018-03-03 | End: 2018-03-08 | Stop reason: HOSPADM

## 2018-03-03 RX ORDER — DONEPEZIL HYDROCHLORIDE 10 MG/1
10 TABLET, FILM COATED ORAL NIGHTLY
Status: DISCONTINUED | OUTPATIENT
Start: 2018-03-03 | End: 2018-03-08 | Stop reason: HOSPADM

## 2018-03-03 RX ORDER — MECLIZINE HCL 12.5 MG/1
12.5 TABLET ORAL 3 TIMES DAILY PRN
Status: CANCELLED | OUTPATIENT
Start: 2018-03-03

## 2018-03-03 RX ORDER — QUETIAPINE FUMARATE 300 MG/1
600 TABLET, FILM COATED ORAL NIGHTLY
Status: DISCONTINUED | OUTPATIENT
Start: 2018-03-03 | End: 2018-03-05

## 2018-03-03 RX ADMIN — ATORVASTATIN CALCIUM 20 MG: 20 TABLET, FILM COATED ORAL at 20:01

## 2018-03-03 RX ADMIN — POTASSIUM CHLORIDE 30 MEQ: 750 CAPSULE, EXTENDED RELEASE ORAL at 12:21

## 2018-03-03 RX ADMIN — TRAZODONE HYDROCHLORIDE 50 MG: 50 TABLET ORAL at 20:01

## 2018-03-03 RX ADMIN — QUETIAPINE FUMARATE 600 MG: 300 TABLET ORAL at 20:01

## 2018-03-03 RX ADMIN — POTASSIUM CHLORIDE 30 MEQ: 750 CAPSULE, EXTENDED RELEASE ORAL at 17:04

## 2018-03-03 RX ADMIN — CLOPIDOGREL BISULFATE 75 MG: 75 TABLET ORAL at 08:03

## 2018-03-03 RX ADMIN — METOPROLOL SUCCINATE 50 MG: 50 TABLET, EXTENDED RELEASE ORAL at 08:04

## 2018-03-03 RX ADMIN — HYDROXYZINE PAMOATE 50 MG: 50 CAPSULE ORAL at 08:04

## 2018-03-03 RX ADMIN — DULOXETINE HYDROCHLORIDE 90 MG: 30 CAPSULE, DELAYED RELEASE ORAL at 08:03

## 2018-03-03 RX ADMIN — HYDROCHLOROTHIAZIDE 25 MG: 25 TABLET ORAL at 08:03

## 2018-03-03 RX ADMIN — ASPIRIN 81 MG 81 MG: 81 TABLET ORAL at 08:03

## 2018-03-03 RX ADMIN — TERAZOSIN HYDROCHLORIDE ANHYDROUS 2 MG: 2 CAPSULE ORAL at 20:01

## 2018-03-03 RX ADMIN — DONEPEZIL HYDROCHLORIDE 10 MG: 10 TABLET ORAL at 20:01

## 2018-03-03 RX ADMIN — PANTOPRAZOLE SODIUM 20 MG: 20 TABLET, DELAYED RELEASE ORAL at 05:44

## 2018-03-03 NOTE — NURSING NOTE
Behavior   Anxiety: Feeling anxious, Feeling worried and Feeling irritable  Depression: depressed mood, anxiety and insomnia  Pain  0  AVH   yes   S/I   yes   H/I   no    Affect   depressed    Note:pt admitted to 656, all consents signed and policy/procedures reviewed.   Pt stated just wanted to sleep.      Intervention  Instructed in medication usage and effects  Medications administered as ordered  Encouraged to verbalize needs      Response  Verbalized understanding   Did patient take medications as ordered yes   Did patient interact with assessment?  yes     Plan  Will monitor for safety  Will monitor every 15 minutes as ordered  Will evaluate and promote the plan of care

## 2018-03-03 NOTE — NURSING NOTE
Pt appears to be asleep awakened for medications, stated not hungry and has not slept all night.   During checks patient has appeared to be asleep.

## 2018-03-03 NOTE — NURSING NOTE
Dr Murdock ROS         General  Fatigue    Eyes   glasses/contact lens    ENT/Mouth   None    Cardio   Heart trouble    Resp   None    GI    Appetite        None    MS    Stiffness    Skin/Hair/Nails   Itching    Neuro   None

## 2018-03-03 NOTE — NURSING NOTE
Dr Murdock ROS         General  NONE    Eyes   glasses/contact lens    ENT/Mouth   None    Cardio   None    Resp   None    GI    None       None    MS    None    Skin/Hair/Nails   None    Neuro   None
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
## 2018-03-03 NOTE — NURSING NOTE
"Isolated in bed most of the day except for meal. \"i'm pretty worried about where i'm gonna live.there's nothing left except dyin.\" encouraged pt to come out with peers. Refused. \"I just want to sleep but I cant. I just keep thinkin\" encouraged pt to notify staff of feelings of suicide or increased depression. Will monitor for safety every 15 minutes.  "

## 2018-03-03 NOTE — NURSING NOTE
"Behavior   Anxiety: Feeling anxious  Depression: depressed mood  Pain  0  AVH   no  S/I   no  H/I   no    Affect   depressed and anxious    Note:isolates in room. Flat affect. \"I'm tired of it. I dont want my kids involved. I'm ashamed. I told my wife she could have the house. I'm  done.\"  Medication compliant.  'I need to get rid of everything and go into a nursing home or die.\"    Intervention  Instructed in medication usage and effects  Medications administered as ordered  Encouraged to verbalize needs. Encouraged pt to increase participation in groups.      Response  Verbalized understanding   Did patient take medications as ordered yes   Did patient interact with assessment?  yes     Plan  Will monitor for safety  Will monitor every 15 minutes as ordered  Will evaluate and promote the plan of care    "
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
## 2018-03-03 NOTE — PLAN OF CARE
Problem: BH Overarching Goals  Goal: Adheres to Safety Considerations for Self and Others  Outcome: Ongoing (interventions implemented as appropriate)    Goal: Optimized Coping Skills in Response to Life Stressors  Outcome: Ongoing (interventions implemented as appropriate)    Goal: Develops/Participates in Therapeutic [place] to Support Successful Transition  Outcome: Ongoing (interventions implemented as appropriate)      Problem: Depression  Goal: Verbalize thoughts and feelings associated with:  Outcome: Ongoing (interventions implemented as appropriate)    Goal: Refrain from isolation  Outcome: Ongoing (interventions implemented as appropriate)    Goal: Refrain from self-neglect  Outcome: Ongoing (interventions implemented as appropriate)    Goal: Attend and participate in unit activities, including therapeutic, recreational, and educational groups  Outcome: Ongoing (interventions implemented as appropriate)    Goal: Complete daily ADLs, including personal hygiene independently, as able  Outcome: Ongoing (interventions implemented as appropriate)      Problem: Risk for Self Injury/Neglect  Goal: Treatment Goal: Remain safe during length of stay, learn and adopt new coping skills, and be free of self-injurious ideation, impulses and acts at the time of discharge  Outcome: Ongoing (interventions implemented as appropriate)    Goal: Verbalize thoughts and feelings associated with:  Outcome: Ongoing (interventions implemented as appropriate)    Goal: Refrain from harming self  Outcome: Ongoing (interventions implemented as appropriate)    Goal: Attend and participate in unit activities, including therapeutic, recreational, and educational groups  Outcome: Ongoing (interventions implemented as appropriate)    Goal: Recognize maladaptive responses and adopt new coping mechanisms  Outcome: Ongoing (interventions implemented as appropriate)    Goal: Complete daily ADLs, including personal hygiene independently, as  able  Outcome: Ongoing (interventions implemented as appropriate)
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
## 2018-03-03 NOTE — PLAN OF CARE
Problem: BH Patient Care Overview (Adult)  Goal: Plan of Care Review  Outcome: Ongoing (interventions implemented as appropriate)    Goal: Interdisciplinary Rounds/Family Conference  Outcome: Ongoing (interventions implemented as appropriate)    Goal: Individualization and Mutuality  Outcome: Ongoing (interventions implemented as appropriate)    Goal: Discharge Needs Assessment  Outcome: Ongoing (interventions implemented as appropriate)      Problem:  Overarching Goals  Goal: Adheres to Safety Considerations for Self and Others  Outcome: Ongoing (interventions implemented as appropriate)    Goal: Optimized Coping Skills in Response to Life Stressors  Outcome: Ongoing (interventions implemented as appropriate)    Goal: Develops/Participates in Therapeutic [place] to Support Successful Transition  Outcome: Ongoing (interventions implemented as appropriate)  New patient , recently dcd

## 2018-03-04 LAB
ALBUMIN SERPL-MCNC: 4 G/DL (ref 3.4–4.8)
ALBUMIN/GLOB SERPL: 1.5 G/DL (ref 1.1–1.8)
ALP SERPL-CCNC: 98 U/L (ref 38–126)
ALT SERPL W P-5'-P-CCNC: 53 U/L (ref 21–72)
ANION GAP SERPL CALCULATED.3IONS-SCNC: 15 MMOL/L (ref 5–15)
AST SERPL-CCNC: 25 U/L (ref 17–59)
BILIRUB SERPL-MCNC: 0.9 MG/DL (ref 0.2–1.3)
BUN BLD-MCNC: 18 MG/DL (ref 7–21)
BUN/CREAT SERPL: 20.7 (ref 7–25)
CALCIUM SPEC-SCNC: 10.6 MG/DL (ref 8.4–10.2)
CHLORIDE SERPL-SCNC: 100 MMOL/L (ref 95–110)
CO2 SERPL-SCNC: 25 MMOL/L (ref 22–31)
CREAT BLD-MCNC: 0.87 MG/DL (ref 0.7–1.3)
GFR SERPL CREATININE-BSD FRML MDRD: 92 ML/MIN/1.73 (ref 56–130)
GLOBULIN UR ELPH-MCNC: 2.6 GM/DL (ref 2.3–3.5)
GLUCOSE BLD-MCNC: 101 MG/DL (ref 60–100)
POTASSIUM BLD-SCNC: 3.6 MMOL/L (ref 3.5–5.1)
PROT SERPL-MCNC: 6.6 G/DL (ref 6.3–8.6)
SODIUM BLD-SCNC: 140 MMOL/L (ref 137–145)

## 2018-03-04 PROCEDURE — 80053 COMPREHEN METABOLIC PANEL: CPT | Performed by: FAMILY MEDICINE

## 2018-03-04 RX ADMIN — DONEPEZIL HYDROCHLORIDE 10 MG: 10 TABLET ORAL at 20:22

## 2018-03-04 RX ADMIN — QUETIAPINE FUMARATE 600 MG: 300 TABLET ORAL at 20:22

## 2018-03-04 RX ADMIN — HYDROXYZINE PAMOATE 50 MG: 50 CAPSULE ORAL at 20:22

## 2018-03-04 RX ADMIN — POTASSIUM CHLORIDE 30 MEQ: 750 CAPSULE, EXTENDED RELEASE ORAL at 08:11

## 2018-03-04 RX ADMIN — TRAZODONE HYDROCHLORIDE 50 MG: 50 TABLET ORAL at 20:22

## 2018-03-04 RX ADMIN — PANTOPRAZOLE SODIUM 20 MG: 20 TABLET, DELAYED RELEASE ORAL at 05:53

## 2018-03-04 RX ADMIN — TERAZOSIN HYDROCHLORIDE ANHYDROUS 2 MG: 2 CAPSULE ORAL at 20:22

## 2018-03-04 RX ADMIN — ASPIRIN 81 MG 81 MG: 81 TABLET ORAL at 08:10

## 2018-03-04 RX ADMIN — METOPROLOL SUCCINATE 50 MG: 50 TABLET, EXTENDED RELEASE ORAL at 08:11

## 2018-03-04 RX ADMIN — CLOPIDOGREL BISULFATE 75 MG: 75 TABLET ORAL at 08:10

## 2018-03-04 RX ADMIN — HYDROCHLOROTHIAZIDE 25 MG: 25 TABLET ORAL at 08:10

## 2018-03-04 RX ADMIN — DULOXETINE HYDROCHLORIDE 90 MG: 30 CAPSULE, DELAYED RELEASE ORAL at 08:10

## 2018-03-04 RX ADMIN — ATORVASTATIN CALCIUM 20 MG: 20 TABLET, FILM COATED ORAL at 20:22

## 2018-03-04 NOTE — PROGRESS NOTES
3/4/2018     Chief Complaint: suicidal ideation and hallucinations     Subjective:  Patient is a 53 y.o. male who was hospitalized for suicidal ideation with plans to over dose on his medication.  He reported that he felt a bit better.He stated that last night he slept well  There appeared to be a slight brightening of his affect.He still wish to die but stated that he will not do any thing in hospital to hurt himself because he feels he is getting help here.He verbalized his wish to go to [facility] term facility as he is not able to make appropriate decisions.          Objective         Vital Signs     Temp:  [97.2 °F   Heart Rate:  89  Resp:  [14-20] 18  BP: 139/80     Physical Exam:   53 years old white male sitting comfortably in chair.  General Appearance: alert, appears stated age and cooperative,  Hygiene:   good  Gait & Station: Normal  Musculoskeletal: No tremors or abnormal involuntary movements     Mental Status Exam:   Cooperation:  Cooperative  Eye Contact:  Fair  Psychomotor Behavior:  Appropriate  Mood: Improving but still sepressed  Affect:  Flat  Speech:  Normal  Thought Process:  Coherent  Associations: Goal Directed  Thought Content:                          Normal                        Suicidal:  None                        Homicidal:  None                        Hallucinations:  None                        Delusion:  None  Cognitive Functioning:                        Consciousness: awake, alert and oriented  Reliability:  fair  Insight:  Fair  Judgement:  Fair  Impulse Control:  Fair     Lab Results (last 24 hours)      ** No results found for the last 24 hours. **              Imaging Results (last 24 hours)      ** No results found for the last 24 hours. **             Medicine:     He will be on medications started at time of admission.    Scheduled        Medication Dose/Rate, Route, Frequency Last Action       aspirin chewable tablet 81 mg 81 mg, PO, Daily Given: 03/04 0810       atorvastatin  (LIPITOR) tablet 20 mg 20 mg, PO, Nightly Given: 03/03 2001       clopidogrel (PLAVIX) tablet 75 mg 75 mg, PO, Daily Given: 03/04 0810       donepezil (ARICEPT) tablet 10 mg 10 mg, PO, Nightly Given: 03/03 2001       DULoxetine (CYMBALTA) DR capsule 90 mg 90 mg, PO, Daily Given: 03/04 0810       hydrochlorothiazide (HYDRODIURIL) tablet 25 mg 25 mg, PO, Daily Given: 03/04 0810       metoprolol succinate XL (TOPROL-XL) 24 hr tablet 50 mg 50 mg, PO, Q24H Given: 03/04 0811       pantoprazole (PROTONIX) EC tablet 20 mg 20 mg, PO, Q AM Given: 03/04 0553       potassium chloride (MICRO-K) CR capsule 30 mEq 30 mEq, PO, TID With Meals Given: 03/04 0811       QUEtiapine (SEROquel) tablet 600 mg 600 mg, PO, Nightly Given: 03/03 2001       terazosin (HYTRIN) capsule 2 mg 2 mg, PO, Nightly Given: 03/03 2001       triamcinolone (KENALOG) 0.1 % ointment 1 application 1 application, TOP, Q12H Ordered              PRN        Medication Dose/Rate, Route, Frequency Last Action       acetaminophen (TYLENOL) tablet 650 mg 650 mg, PO, Q4H PRN Ordered       aluminum-magnesium hydroxide-simethicone (MAALOX MAX) 400-400-40 MG/5ML suspension 15 mL 15 mL, PO, Q6H PRN Ordered       diphenhydrAMINE (BENADRYL) capsule 50 mg 50 mg, PO, Q6H PRN Ordered       hydrOXYzine (VISTARIL) capsule 50 mg 50 mg, PO, Q6H PRN Given: 03/03 0804       loperamide (IMODIUM) capsule 2 mg 2 mg, PO, 4x Daily PRN Ordered       magnesium hydroxide (MILK OF MAGNESIA) suspension 2400 mg/10mL 10 mL 10 mL, PO, Daily PRN Ordered       meclizine (ANTIVERT) tablet 12.5 mg 12.5 mg, PO, TID PRN Ordered       traZODone (DESYREL) tablet 50 mg 50 mg, PO, Nightly PRN Given: 03/03 2001                Diagnoses/Assessment:   Principal Problem:    Severe episode of recurrent major depressive disorder, with psychotic features  Active Problems:    Dementia in Alzheimer's disease with early onset    Amphetamine use disorder, severe        Treatment Plan:     1) Will continue care for the  patient on the behavioral health unit at [facility] to ensure patient safety.  2) Will continue to provide treatment with the unit milieu, activities, therapies and psychopharmacological management.  3) Patient to be placed on or continued on  Q15 minute checks  and Suicide precautions.  4) Pertinent medical issues: No active medical issues.  5) Will order following labs: none  6) Will make the following medication changes: Will continue the seroquil , cymbalta and aricept.  7) Will continue discharge planning as appropriate for patient.  8) Psychotherapy provided: none        Gino Haines MD  03/04/18  10:00 AM

## 2018-03-04 NOTE — PLAN OF CARE
Problem: BH Patient Care Overview (Adult)  Goal: Plan of Care Review  Outcome: Ongoing (interventions implemented as appropriate)    Goal: Interdisciplinary Rounds/Family Conference  Outcome: Ongoing (interventions implemented as appropriate)    Goal: Individualization and Mutuality  Outcome: Ongoing (interventions implemented as appropriate)    Goal: Discharge Needs Assessment  Outcome: Ongoing (interventions implemented as appropriate)      Problem:  Overarching Goals  Goal: Adheres to Safety Considerations for Self and Others  Outcome: Ongoing (interventions implemented as appropriate)    Goal: Optimized Coping Skills in Response to Life Stressors  Outcome: Ongoing (interventions implemented as appropriate)    Goal: Develops/Participates in Therapeutic [place] to Support Successful Transition  Outcome: Ongoing (interventions implemented as appropriate)

## 2018-03-04 NOTE — NURSING NOTE
Behavior   Anxiety: Feeling anxious and Feeling worried  Depression: anxiety and insomnia  Pain  0  AVH   no  S/I   yes   H/I   no    Affect   very depressed    Note:pt lying in bed, waiting for pm medications, stated day has not been good, stated doesn't hear voices, but very depressed, pt stated wanted something to help him go to sleep, denied snack or wanting to attend group.  Pt medicated with Trazadone  Per request.      Intervention  Instructed in medication usage and effects  Medications administered as ordered  Encouraged to verbalize needs      Response  Verbalized understanding   Did patient take medications as ordered yes   Did patient interact with assessment?  yes     Plan  Will monitor for safety  Will monitor every 15 minutes as ordered  Will evaluate and promote the plan of care

## 2018-03-04 NOTE — PLAN OF CARE
Problem: Risk for Self Injury/Neglect  Goal: Verbalize thoughts and feelings associated with:  Outcome: Ongoing (interventions implemented as appropriate)    Goal: Refrain from harming self  Outcome: Ongoing (interventions implemented as appropriate)    Goal: Attend and participate in unit activities, including therapeutic, recreational, and educational groups  Outcome: Ongoing (interventions implemented as appropriate)    Goal: Recognize maladaptive responses and adopt new coping mechanisms  Outcome: Ongoing (interventions implemented as appropriate)    Goal: Complete daily ADLs, including personal hygiene independently, as able  Outcome: Ongoing (interventions implemented as appropriate)
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
## 2018-03-05 PROBLEM — R45.851 SUICIDE IDEATION: Status: ACTIVE | Noted: 2018-03-05

## 2018-03-05 PROCEDURE — 99232 SBSQ HOSP IP/OBS MODERATE 35: CPT | Performed by: PSYCHIATRY & NEUROLOGY

## 2018-03-05 RX ORDER — QUETIAPINE FUMARATE 100 MG/1
100 TABLET, FILM COATED ORAL DAILY
Status: DISCONTINUED | OUTPATIENT
Start: 2018-03-05 | End: 2018-03-07

## 2018-03-05 RX ORDER — PANTOPRAZOLE SODIUM 20 MG/1
20 TABLET, DELAYED RELEASE ORAL
Status: DISCONTINUED | OUTPATIENT
Start: 2018-03-05 | End: 2018-03-08 | Stop reason: HOSPADM

## 2018-03-05 RX ADMIN — DONEPEZIL HYDROCHLORIDE 10 MG: 10 TABLET ORAL at 20:45

## 2018-03-05 RX ADMIN — QUETIAPINE 100 MG: 100 TABLET ORAL at 13:42

## 2018-03-05 RX ADMIN — TRIAMCINOLONE ACETONIDE 1 APPLICATION: 1 OINTMENT TOPICAL at 09:00

## 2018-03-05 RX ADMIN — PANTOPRAZOLE SODIUM 20 MG: 20 TABLET, DELAYED RELEASE ORAL at 08:40

## 2018-03-05 RX ADMIN — CLOPIDOGREL BISULFATE 75 MG: 75 TABLET ORAL at 08:38

## 2018-03-05 RX ADMIN — HYDROXYZINE PAMOATE 50 MG: 50 CAPSULE ORAL at 08:56

## 2018-03-05 RX ADMIN — QUETIAPINE 400 MG: 100 TABLET ORAL at 20:45

## 2018-03-05 RX ADMIN — POTASSIUM CHLORIDE 30 MEQ: 750 CAPSULE, EXTENDED RELEASE ORAL at 08:38

## 2018-03-05 RX ADMIN — HYDROCHLOROTHIAZIDE 25 MG: 25 TABLET ORAL at 08:39

## 2018-03-05 RX ADMIN — ASPIRIN 81 MG 81 MG: 81 TABLET ORAL at 08:38

## 2018-03-05 RX ADMIN — POTASSIUM CHLORIDE 30 MEQ: 750 CAPSULE, EXTENDED RELEASE ORAL at 13:32

## 2018-03-05 RX ADMIN — TERAZOSIN HYDROCHLORIDE ANHYDROUS 2 MG: 2 CAPSULE ORAL at 20:45

## 2018-03-05 RX ADMIN — ATORVASTATIN CALCIUM 20 MG: 20 TABLET, FILM COATED ORAL at 20:45

## 2018-03-05 RX ADMIN — METOPROLOL SUCCINATE 50 MG: 50 TABLET, EXTENDED RELEASE ORAL at 08:38

## 2018-03-05 RX ADMIN — DULOXETINE HYDROCHLORIDE 90 MG: 30 CAPSULE, DELAYED RELEASE ORAL at 08:38

## 2018-03-05 RX ADMIN — POTASSIUM CHLORIDE 30 MEQ: 750 CAPSULE, EXTENDED RELEASE ORAL at 17:30

## 2018-03-05 NOTE — PROGRESS NOTES
Patient has not participated in any groups today.  Last week he made significant progress.  Patient participated well in all groups and became more socially interactive.  He participated appropriately and by the end of the week, he was not exhibiting unusual behavior.  Patient will be encouraged to participate appropriately in all groups and increase social interaction

## 2018-03-05 NOTE — PROGRESS NOTES
3/5/2018    Chief Complaint: suicidal ideation    Subjective:  Patient is a 53 y.o. male who was hospitalized for suicidal ideation.  Patient notes that he got overwhelmed and could not deal with stress on the outside.  He notes that he wants to go to a personal care home.  He notes that the seroquel is helpful but asks if he can take some of it during the day b/c of daytime anxiety.    Objective     Vital Signs    Temp:  [98.7 °F (37.1 °C)-98.9 °F (37.2 °C)] 98.7 °F (37.1 °C)  Heart Rate:  [84-89] 84  Resp:  [18] 18  BP: (101-114)/(62-71) 105/65    Physical Exam:   General Appearance: alert, appears stated age and cooperative,  Hygiene:   good  Gait & Station: Normal  Musculoskeletal: No tremors or abnormal involuntary movements    Mental Status Exam:   Cooperation:  Cooperative  Eye Contact:  Good  Psychomotor Behavior:  Appropriate  Mood: Anxious/Nervous  Affect:  constricted  Speech:  Normal  Thought Process:  Coherent  Associations: Goal Directed  Thought Content:     Normal   Suicidal:  None   Homicidal:  None   Hallucinations:  None   Delusion:  None  Cognitive Functioning:   Consciousness: awake, alert and oriented  Reliability:  fair  Insight:  Fair  Judgement:  Fair  Impulse Control:  Fair    Lab Results (last 24 hours)     ** No results found for the last 24 hours. **        Imaging Results (last 24 hours)     ** No results found for the last 24 hours. **          Medicine:   Current Facility-Administered Medications:   •  acetaminophen (TYLENOL) tablet 650 mg, 650 mg, Oral, Q4H PRN, Gino Haines MD  •  aluminum-magnesium hydroxide-simethicone (MAALOX MAX) 400-400-40 MG/5ML suspension 15 mL, 15 mL, Oral, Q6H PRN, Gino Haines MD  •  aspirin chewable tablet 81 mg, 81 mg, Oral, Daily, Gino Haines MD, 81 mg at 03/05/18 0838  •  atorvastatin (LIPITOR) tablet 20 mg, 20 mg, Oral, Nightly, Gino Haines MD, 20 mg at 03/04/18 2022  •  clopidogrel (PLAVIX) tablet 75 mg, 75 mg, Oral, Daily, Gino Haines MD, 75 mg at  03/05/18 0838  •  diphenhydrAMINE (BENADRYL) capsule 50 mg, 50 mg, Oral, Q6H PRN, Gino Haines MD  •  donepezil (ARICEPT) tablet 10 mg, 10 mg, Oral, Nightly, Gino Haines MD, 10 mg at 03/04/18 2022  •  DULoxetine (CYMBALTA) DR capsule 90 mg, 90 mg, Oral, Daily, Gino Haines MD, 90 mg at 03/05/18 0838  •  hydrochlorothiazide (HYDRODIURIL) tablet 25 mg, 25 mg, Oral, Daily, Gino Haines MD, 25 mg at 03/05/18 0839  •  hydrOXYzine (VISTARIL) capsule 50 mg, 50 mg, Oral, Q6H PRN, Gino Haines MD, 50 mg at 03/05/18 0856  •  loperamide (IMODIUM) capsule 2 mg, 2 mg, Oral, 4x Daily PRN, Gino Haines MD  •  magnesium hydroxide (MILK OF MAGNESIA) suspension 2400 mg/10mL 10 mL, 10 mL, Oral, Daily PRN, Gino Haines MD  •  meclizine (ANTIVERT) tablet 12.5 mg, 12.5 mg, Oral, TID PRN, Gino Haines MD  •  metoprolol succinate XL (TOPROL-XL) 24 hr tablet 50 mg, 50 mg, Oral, Q24H, Gino Haines MD, 50 mg at 03/05/18 0838  •  pantoprazole (PROTONIX) EC tablet 20 mg, 20 mg, Oral, QAM AC, Gino Haines MD, 20 mg at 03/05/18 0840  •  potassium chloride (MICRO-K) CR capsule 30 mEq, 30 mEq, Oral, TID With Meals, Isidro Murdock MD, 30 mEq at 03/05/18 0838  •  QUEtiapine (SEROquel) tablet 600 mg, 600 mg, Oral, Nightly, Gino Haines MD, 600 mg at 03/04/18 2022  •  terazosin (HYTRIN) capsule 2 mg, 2 mg, Oral, Nightly, Gino Haines MD, 2 mg at 03/04/18 2022  •  traZODone (DESYREL) tablet 50 mg, 50 mg, Oral, Nightly PRN, Gino Haines MD, 50 mg at 03/04/18 2022  •  triamcinolone (KENALOG) 0.1 % ointment 1 application, 1 application, Topical, Q12H, Gino Haines MD, 1 application at 03/05/18 0900    Diagnoses/Assessment:   Principal Problem:    Severe episode of recurrent major depressive disorder, with psychotic features  Active Problems:    Dementia in Alzheimer's disease with early onset    Suicide ideation      Treatment Plan:    1) Will continue care for the patient on the behavioral health unit at [facility] to ensure patient safety.  2) Will  continue to provide treatment with the unit milieu, activities, therapies and psychopharmacological management.  3) Patient to be placed on or continued on  Q15 minute checks  and Suicide precautions.  4) Pertinent medical issues: No active medical issues.  5) Will order following labs: none  6) Will make the following medication changes: Will decrease the seroquel to 400mg qhs and start 100mg qam.  Will continue aricept, cymbalta and prn trazocone.  7) Will continue discharge planning as appropriate for patient.  8) Psychotherapy provided: none    Treatment plan and medication risks and benefits discussed with: Patient    Gerry Trinidad MD  03/05/18  12:24 PM

## 2018-03-05 NOTE — NURSING NOTE
"Behavior   Anxiety: Feeling anxious, Feeling worried and Difficulty concentrating  Depression: depressed mood, anhedonia and fatigue, hopelessness  Pain   0  AVH   no  S/I   yes   H/I   no  Affect   Depressed    Patient interviewed at bedside.  Appearance is appropriate, he appears clean and wearing appropriate attire.  Eye contact is WNL throughout conversation.  Speech is normal in rate, rhythm, volume, and tone.  Patient is alert and oriented during exchange.  Patient seems restless and fidgets often during assessment; wringing hands, shifting in bed often, and maneuvering pillow.    Patient reports not having a good day.  He states he feels hopeless and doesn't have an interest in living anymore.  He states he isn't sure why he feels this way, but he \"just doesn't.\"  He is currently feeling suicidal but has no plan at this time.  He reports \"many things happening\" when he was discharged, Friday from our service.  Patient is ask about events that occurred and he states, \"I can't talk about all that tonight.  I really can't.\"  He appears tearful about whatever events occurred, Friday.  He also seems to have an increase in anxiety when referring to that day:  Increased shifting of body, wringing of hands, and swallowing.  Conversation is diverted and patient seems to calm some.  Medications are given and patient is assured of staff support and availability should he want to talk.  He agrees and is compliant with medication(s).    Patient reports an improved appetite at lunch and supper and normal toileting habits.  He complains of some itching to the abdomen but does not want to apply the ointment this evening.      Intervention  Medications reviewed and administered  Assured of staff support and availability  Assessment complete    Response  Verbalized understanding   Took medications apart from application of ointment  Interacted with assessment    Plan  Will promote and reinforce current treatment plan and " encourage involvement in care plan goals.   Will provide for safe, calm, quiet environment.  Will promote open communication with staff and foster a trusting/working relationship with patient.   Will promote participation in groups and therapies and independent reflection.

## 2018-03-05 NOTE — PLAN OF CARE
Problem: BH Patient Care Overview (Adult)  Goal: Individualization and Mutuality  Outcome: Ongoing (interventions implemented as appropriate)    Goal: Discharge Needs Assessment  Outcome: Ongoing (interventions implemented as appropriate)      Problem:  Overarching Goals  Goal: Adheres to Safety Considerations for Self and Others  Outcome: Ongoing (interventions implemented as appropriate)    Goal: Optimized Coping Skills in Response to Life Stressors  Outcome: Ongoing (interventions implemented as appropriate)    Goal: Develops/Participates in Therapeutic [place] to Support Successful Transition  Outcome: Ongoing (interventions implemented as appropriate)

## 2018-03-05 NOTE — NURSING NOTE
Behavior   Anxiety: Feeling anxious  Depression: depressed mood and anxiety  Pain  0  AVH   no  S/I   no  H/I   no    Affect   calm and pleasant    Note: Patient upset because he has to go to a personal care home & is worried how he will get his stuff there. Rash noted to skin related to sensitivity.      Intervention  Instructed in medication usage and effects  Medications administered as ordered  Encouraged to verbalize needs      Response  Verbalized understanding   Did patient take medications as ordered yes  Did patient interact with assessment?  yes     Plan  Will monitor for safety  Will monitor every 15 minutes as ordered  Will evaluate and promote the plan of care

## 2018-03-06 PROCEDURE — 99232 SBSQ HOSP IP/OBS MODERATE 35: CPT | Performed by: PSYCHIATRY & NEUROLOGY

## 2018-03-06 PROCEDURE — 63710000001 DIPHENHYDRAMINE PER 50 MG: Performed by: PSYCHIATRY & NEUROLOGY

## 2018-03-06 RX ORDER — DIPHENHYDRAMINE HCL 50 MG
50 CAPSULE ORAL 3 TIMES DAILY
Status: DISCONTINUED | OUTPATIENT
Start: 2018-03-06 | End: 2018-03-08 | Stop reason: HOSPADM

## 2018-03-06 RX ORDER — DIAPER,BRIEF,INFANT-TODD,DISP
EACH MISCELLANEOUS EVERY 12 HOURS SCHEDULED
Status: DISCONTINUED | OUTPATIENT
Start: 2018-03-06 | End: 2018-03-08 | Stop reason: HOSPADM

## 2018-03-06 RX ADMIN — QUETIAPINE 100 MG: 100 TABLET ORAL at 08:05

## 2018-03-06 RX ADMIN — POTASSIUM CHLORIDE 30 MEQ: 750 CAPSULE, EXTENDED RELEASE ORAL at 08:04

## 2018-03-06 RX ADMIN — ASPIRIN 81 MG 81 MG: 81 TABLET ORAL at 08:04

## 2018-03-06 RX ADMIN — TRIAMCINOLONE ACETONIDE 1 APPLICATION: 1 OINTMENT TOPICAL at 08:05

## 2018-03-06 RX ADMIN — ATORVASTATIN CALCIUM 20 MG: 20 TABLET, FILM COATED ORAL at 20:14

## 2018-03-06 RX ADMIN — HYDROCORTISONE: 1 CREAM TOPICAL at 11:50

## 2018-03-06 RX ADMIN — DIPHENHYDRAMINE HYDROCHLORIDE 50 MG: 50 CAPSULE ORAL at 07:49

## 2018-03-06 RX ADMIN — DULOXETINE HYDROCHLORIDE 90 MG: 30 CAPSULE, DELAYED RELEASE ORAL at 08:04

## 2018-03-06 RX ADMIN — TRAZODONE HYDROCHLORIDE 50 MG: 50 TABLET ORAL at 20:14

## 2018-03-06 RX ADMIN — CLOPIDOGREL BISULFATE 75 MG: 75 TABLET ORAL at 08:04

## 2018-03-06 RX ADMIN — ALUMINUM HYDROXIDE, MAGNESIUM HYDROXIDE, AND DIMETHICONE 15 ML: 400; 400; 40 SUSPENSION ORAL at 08:57

## 2018-03-06 RX ADMIN — TERAZOSIN HYDROCHLORIDE ANHYDROUS 2 MG: 2 CAPSULE ORAL at 20:14

## 2018-03-06 RX ADMIN — DIPHENHYDRAMINE HYDROCHLORIDE 50 MG: 50 CAPSULE ORAL at 17:29

## 2018-03-06 RX ADMIN — TUBERCULIN PURIFIED PROTEIN DERIVATIVE 5 UNITS: 5 INJECTION, SOLUTION INTRADERMAL at 11:40

## 2018-03-06 RX ADMIN — HYDROCHLOROTHIAZIDE 25 MG: 25 TABLET ORAL at 08:05

## 2018-03-06 RX ADMIN — METOPROLOL SUCCINATE 50 MG: 50 TABLET, EXTENDED RELEASE ORAL at 08:04

## 2018-03-06 RX ADMIN — QUETIAPINE 400 MG: 100 TABLET ORAL at 20:14

## 2018-03-06 RX ADMIN — PANTOPRAZOLE SODIUM 20 MG: 20 TABLET, DELAYED RELEASE ORAL at 06:41

## 2018-03-06 RX ADMIN — DIPHENHYDRAMINE HYDROCHLORIDE 50 MG: 50 CAPSULE ORAL at 11:40

## 2018-03-06 RX ADMIN — DIPHENHYDRAMINE HYDROCHLORIDE 50 MG: 50 CAPSULE ORAL at 20:14

## 2018-03-06 RX ADMIN — DONEPEZIL HYDROCHLORIDE 10 MG: 10 TABLET ORAL at 20:14

## 2018-03-06 NOTE — PLAN OF CARE
Problem: BH Patient Care Overview (Adult)  Goal: Plan of Care Review  Outcome: Ongoing (interventions implemented as appropriate)   03/05/18 6290   Coping/Psychosocial Response Interventions   Plan Of Care Reviewed With patient   Coping/Psychosocial   Patient Agreement with Plan of Care agrees   Patient Care Overview   Progress no change     Goal: Individualization and Mutuality  Outcome: Ongoing (interventions implemented as appropriate)    Goal: Discharge Needs Assessment  Outcome: Ongoing (interventions implemented as appropriate)      Problem:  Overarching Goals  Goal: Adheres to Safety Considerations for Self and Others  Outcome: Ongoing (interventions implemented as appropriate)    Goal: Optimized Coping Skills in Response to Life Stressors  Outcome: Ongoing (interventions implemented as appropriate)    Goal: Develops/Participates in Therapeutic [place] to Support Successful Transition  Outcome: Ongoing (interventions implemented as appropriate)

## 2018-03-06 NOTE — PLAN OF CARE
Problem: BH Patient Care Overview (Adult)  Goal: Individualization and Mutuality  Outcome: Ongoing (interventions implemented as appropriate)    Goal: Discharge Needs Assessment  Outcome: Ongoing (interventions implemented as appropriate)      Problem: BH Overarching Goals  Goal: Adheres to Safety Considerations for Self and Others  Outcome: Ongoing (interventions implemented as appropriate)    Goal: Optimized Coping Skills in Response to Life Stressors  Outcome: Ongoing (interventions implemented as appropriate)    Goal: Develops/Participates in Therapeutic [place] to Support Successful Transition  Outcome: Ongoing (interventions implemented as appropriate)      Problem: Depression  Goal: Treatment Goal: Demonstrate behavioral control of depressive symptoms, verbalize feelings of improved mood/affect, and adopt new coping skills prior to discharge  Outcome: Ongoing (interventions implemented as appropriate)    Goal: Verbalize thoughts and feelings associated with:  Outcome: Ongoing (interventions implemented as appropriate)    Goal: Refrain from harming self  Outcome: Ongoing (interventions implemented as appropriate)    Goal: Refrain from isolation  Outcome: Ongoing (interventions implemented as appropriate)    Goal: Refrain from self-neglect  Outcome: Ongoing (interventions implemented as appropriate)    Goal: Attend and participate in unit activities, including therapeutic, recreational, and educational groups  Outcome: Ongoing (interventions implemented as appropriate)    Goal: Complete daily ADLs, including personal hygiene independently, as able  Outcome: Ongoing (interventions implemented as appropriate)      Problem: Risk for Self Injury/Neglect  Goal: Treatment Goal: Remain safe during length of stay, learn and adopt new coping skills, and be free of self-injurious ideation, impulses and acts at the time of discharge  Outcome: Ongoing (interventions implemented as appropriate)    Goal: Verbalize thoughts  and feelings associated with:  Outcome: Ongoing (interventions implemented as appropriate)    Goal: Refrain from harming self  Outcome: Ongoing (interventions implemented as appropriate)    Goal: Attend and participate in unit activities, including therapeutic, recreational, and educational groups  Outcome: Ongoing (interventions implemented as appropriate)    Goal: Recognize maladaptive responses and adopt new coping mechanisms  Outcome: Ongoing (interventions implemented as appropriate)    Goal: Complete daily ADLs, including personal hygiene independently, as able  Outcome: Ongoing (interventions implemented as appropriate)

## 2018-03-06 NOTE — PROGRESS NOTES
"3/6/2018    Chief Complaint: suicidal ideation    Subjective:  Patient is a 53 y.o. male who was hospitalized for suicidal ideation.   Since yesterday the patient has been stable.  He notes that he did well with the 100mg of seroquel this morning.  He has decided that a [facility] is the best way to go.  He has assessment by the [facility] this afternoon.  He has a rash with raised welts.  He had something similar last time when he came in and he was placed on benadryl and hyrdrocortisone cream.    Objective     Vital Signs    Temp:  [99.3 °F (37.4 °C)] 99.3 °F (37.4 °C)  Heart Rate:  [79-88] 79  Resp:  [18] 18  BP: (111-115)/(64-69) 115/69    Physical Exam:   General Appearance: alert, appears stated age and cooperative,  Hygiene:   good  Gait & Station: Normal  Musculoskeletal: No tremors or abnormal involuntary movements    Mental Status Exam:   Cooperation:  Cooperative  Eye Contact:  Good  Psychomotor Behavior:  Appropriate  Mood: \"Fine\"  Affect:  constricted  Speech:  Normal  Thought Process:  Coherent  Associations: Goal Directed  Thought Content:     Normal   Suicidal:  None   Homicidal:  None   Hallucinations:  None   Delusion:  None  Cognitive Functioning:   Consciousness: awake, alert and oriented  Reliability:  fair  Insight:  Fair  Judgement:  Fair  Impulse Control:  Fair    Lab Results (last 24 hours)     ** No results found for the last 24 hours. **        Imaging Results (last 24 hours)     ** No results found for the last 24 hours. **          Medicine:   Current Facility-Administered Medications:   •  acetaminophen (TYLENOL) tablet 650 mg, 650 mg, Oral, Q4H PRN, Gino Haines MD  •  aluminum-magnesium hydroxide-simethicone (MAALOX MAX) 400-400-40 MG/5ML suspension 15 mL, 15 mL, Oral, Q6H PRN, Gino Haines MD, 15 mL at 03/06/18 0857  •  aspirin chewable tablet 81 mg, 81 mg, Oral, Daily, Gino Haines MD, 81 mg at 03/06/18 0804  •  atorvastatin (LIPITOR) tablet 20 mg, 20 mg, Oral, Nightly, Gino Haines MD, 20 mg at " 03/05/18 2045  •  clopidogrel (PLAVIX) tablet 75 mg, 75 mg, Oral, Daily, Gino Haines MD, 75 mg at 03/06/18 0804  •  diphenhydrAMINE (BENADRYL) capsule 50 mg, 50 mg, Oral, Q6H PRN, Gino Haines MD, 50 mg at 03/06/18 0749  •  donepezil (ARICEPT) tablet 10 mg, 10 mg, Oral, Nightly, Gino Haines MD, 10 mg at 03/05/18 2045  •  DULoxetine (CYMBALTA) DR capsule 90 mg, 90 mg, Oral, Daily, Gino Haines MD, 90 mg at 03/06/18 0804  •  hydrochlorothiazide (HYDRODIURIL) tablet 25 mg, 25 mg, Oral, Daily, Gino Haines MD, 25 mg at 03/06/18 0805  •  hydrOXYzine (VISTARIL) capsule 50 mg, 50 mg, Oral, Q6H PRN, Gino Haines MD, 50 mg at 03/05/18 0856  •  loperamide (IMODIUM) capsule 2 mg, 2 mg, Oral, 4x Daily PRN, Gino Haines MD  •  magnesium hydroxide (MILK OF MAGNESIA) suspension 2400 mg/10mL 10 mL, 10 mL, Oral, Daily PRN, Gino Haines MD  •  meclizine (ANTIVERT) tablet 12.5 mg, 12.5 mg, Oral, TID PRN, Gino Haines MD  •  metoprolol succinate XL (TOPROL-XL) 24 hr tablet 50 mg, 50 mg, Oral, Q24H, Gino Haines MD, 50 mg at 03/06/18 0804  •  pantoprazole (PROTONIX) EC tablet 20 mg, 20 mg, Oral, QAM AC, Gino Haines MD, 20 mg at 03/06/18 0641  •  potassium chloride (MICRO-K) CR capsule 30 mEq, 30 mEq, Oral, TID With Meals, Isidro Murdock MD, 30 mEq at 03/06/18 0804  •  QUEtiapine (SEROquel) tablet 100 mg, 100 mg, Oral, Daily, Gerry Trinidad MD, 100 mg at 03/06/18 0805  •  QUEtiapine (SEROquel) tablet 400 mg, 400 mg, Oral, Nightly, Gerry Trinidad MD, 400 mg at 03/05/18 2045  •  terazosin (HYTRIN) capsule 2 mg, 2 mg, Oral, Nightly, Gino Haines MD, 2 mg at 03/05/18 2045  •  traZODone (DESYREL) tablet 50 mg, 50 mg, Oral, Nightly PRN, Gino Haines MD, 50 mg at 03/04/18 2022  •  triamcinolone (KENALOG) 0.1 % ointment 1 application, 1 application, Topical, Q12H, Gino Haines MD, 1 application at 03/06/18 0805  •  tuberculin injection 5 Units, 5 Units, Intradermal, Once, Gerry Trinidad MD    Diagnoses/Assessment:   Principal Problem:     Severe episode of recurrent major depressive disorder, with psychotic features  Active Problems:    Dementia in Alzheimer's disease with early onset    Suicide ideation      Treatment Plan:    1) Will continue care for the patient on the behavioral health unit at [facility] to ensure patient safety.  2) Will continue to provide treatment with the unit milieu, activities, therapies and psychopharmacological management.  3) Patient to be placed on or continued on  Q15 minute checks  and Suicide precautions.  4) Pertinent medical issues: Will start benadryl 50mg tid and hydrocortisone cream for his rash.  5) Will order following labs: none  6) Will make the following medication changes: Will continue the seroquel at 400mg qhs and 100mg qam.  Will continue aricept, cymbalta and prn trazocone.  7) Will continue discharge planning as appropriate for patient.  8) Psychotherapy provided: none    Treatment plan and medication risks and benefits discussed with: Patient    Gerry Trinidad MD  03/06/18  10:23 AM

## 2018-03-06 NOTE — NURSING NOTE
Behavior   Anxiety: No s/s of anxiety  Depression: hypersomnia  Pain   0  AVH   no  S/I   no  H/I   no  Affect   calm and pleasant    Patient is in bed and appears to be sleep. Speech is clear, dressed appropriately, makes intermittent eye contact. Patient states that he's feeling better.                Intervention  Medications reviewed and administered  Assessment complete    Response  Verbalized understanding   Took medications  Interacted with assessment    Plan  Will promote and reinforce current treatment plan and encourage involvement in care plan goals.   Will provide for safe, calm, quiet environment.  Will promote open communication with staff and foster a trusting/working relationship with patient.   Will promote participation in groups and therapies and independent reflection.
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
## 2018-03-06 NOTE — PLAN OF CARE
Problem: BH Overarching Goals  Goal: Adheres to Safety Considerations for Self and Others  Outcome: Ongoing (interventions implemented as appropriate)    Goal: Optimized Coping Skills in Response to Life Stressors  Outcome: Ongoing (interventions implemented as appropriate)    Goal: Develops/Participates in Therapeutic [place] to Support Successful Transition  Outcome: Ongoing (interventions implemented as appropriate)      Problem: Depression  Goal: Treatment Goal: Demonstrate behavioral control of depressive symptoms, verbalize feelings of improved mood/affect, and adopt new coping skills prior to discharge  Outcome: Ongoing (interventions implemented as appropriate)    Goal: Verbalize thoughts and feelings associated with:  Outcome: Ongoing (interventions implemented as appropriate)    Goal: Refrain from harming self  Outcome: Ongoing (interventions implemented as appropriate)    Goal: Refrain from isolation  Outcome: Ongoing (interventions implemented as appropriate)    Goal: Refrain from self-neglect  Outcome: Ongoing (interventions implemented as appropriate)    Goal: Attend and participate in unit activities, including therapeutic, recreational, and educational groups  Outcome: Ongoing (interventions implemented as appropriate)    Goal: Complete daily ADLs, including personal hygiene independently, as able  Outcome: Ongoing (interventions implemented as appropriate)      Problem: Risk for Self Injury/Neglect  Goal: Treatment Goal: Remain safe during length of stay, learn and adopt new coping skills, and be free of self-injurious ideation, impulses and acts at the time of discharge  Outcome: Ongoing (interventions implemented as appropriate)    Goal: Verbalize thoughts and feelings associated with:  Outcome: Ongoing (interventions implemented as appropriate)    Goal: Refrain from harming self  Outcome: Ongoing (interventions implemented as appropriate)    Goal: Attend and participate in unit activities,  including therapeutic, recreational, and educational groups  Outcome: Ongoing (interventions implemented as appropriate)    Goal: Recognize maladaptive responses and adopt new coping mechanisms  Outcome: Ongoing (interventions implemented as appropriate)    Goal: Complete daily ADLs, including personal hygiene independently, as able  Outcome: Ongoing (interventions implemented as appropriate)

## 2018-03-06 NOTE — NURSING NOTE
"Behavior   Anxiety: Feeling anxious  Depression: depressed mood  Pain  0  AVH   no  S/I   no  H/I   no    Affect   depressed and anxious    Note:isolates  in room. C/o itching . Red areas noted on arms and back. Dr hess notified. \"I'm not as suicidal. I'm ready to get in a personal care home.I need to get out of here.I dont like groups. I dont fit in. I dont like being put on the spot.half the group is stupid. They close the lights and basically tell us to go to hell with a positive attitude. \" eye contact is fair.     Intervention  Instructed in medication usage and effects  Medications administered as ordered  Encouraged to verbalize needs. Medicated for itching.pt is medication compliant. Encouraged pt to participate in groups.      Response  Verbalized understanding   Did patient take medications as ordered yes   Did patient interact with assessment?  yes     Plan  Will monitor for safety  Will monitor every 15 minutes as ordered  Will evaluate and promote the plan of care  "

## 2018-03-07 PROCEDURE — 99232 SBSQ HOSP IP/OBS MODERATE 35: CPT | Performed by: PSYCHIATRY & NEUROLOGY

## 2018-03-07 PROCEDURE — 63710000001 DIPHENHYDRAMINE PER 50 MG: Performed by: PSYCHIATRY & NEUROLOGY

## 2018-03-07 RX ORDER — QUETIAPINE FUMARATE 300 MG/1
600 TABLET, FILM COATED ORAL NIGHTLY
Status: DISCONTINUED | OUTPATIENT
Start: 2018-03-07 | End: 2018-03-08 | Stop reason: HOSPADM

## 2018-03-07 RX ADMIN — ALUMINUM HYDROXIDE, MAGNESIUM HYDROXIDE, AND DIMETHICONE 15 ML: 400; 400; 40 SUSPENSION ORAL at 08:44

## 2018-03-07 RX ADMIN — DIPHENHYDRAMINE HYDROCHLORIDE 50 MG: 50 CAPSULE ORAL at 08:37

## 2018-03-07 RX ADMIN — DONEPEZIL HYDROCHLORIDE 10 MG: 10 TABLET ORAL at 20:05

## 2018-03-07 RX ADMIN — QUETIAPINE FUMARATE 600 MG: 300 TABLET ORAL at 20:04

## 2018-03-07 RX ADMIN — DULOXETINE HYDROCHLORIDE 90 MG: 30 CAPSULE, DELAYED RELEASE ORAL at 08:38

## 2018-03-07 RX ADMIN — TRIAMCINOLONE ACETONIDE 1 APPLICATION: 1 OINTMENT TOPICAL at 10:00

## 2018-03-07 RX ADMIN — HYDROCORTISONE: 1 CREAM TOPICAL at 20:08

## 2018-03-07 RX ADMIN — QUETIAPINE 100 MG: 100 TABLET ORAL at 08:38

## 2018-03-07 RX ADMIN — DIPHENHYDRAMINE HYDROCHLORIDE 50 MG: 50 CAPSULE ORAL at 17:00

## 2018-03-07 RX ADMIN — DIPHENHYDRAMINE HYDROCHLORIDE 50 MG: 50 CAPSULE ORAL at 20:04

## 2018-03-07 RX ADMIN — ATORVASTATIN CALCIUM 20 MG: 20 TABLET, FILM COATED ORAL at 20:04

## 2018-03-07 RX ADMIN — PANTOPRAZOLE SODIUM 20 MG: 20 TABLET, DELAYED RELEASE ORAL at 10:30

## 2018-03-07 RX ADMIN — TERAZOSIN HYDROCHLORIDE ANHYDROUS 2 MG: 2 CAPSULE ORAL at 20:05

## 2018-03-07 RX ADMIN — HYDROCHLOROTHIAZIDE 25 MG: 25 TABLET ORAL at 08:37

## 2018-03-07 RX ADMIN — ASPIRIN 81 MG 81 MG: 81 TABLET ORAL at 08:37

## 2018-03-07 RX ADMIN — CLOPIDOGREL BISULFATE 75 MG: 75 TABLET ORAL at 08:37

## 2018-03-07 RX ADMIN — HYDROCORTISONE: 1 CREAM TOPICAL at 10:00

## 2018-03-07 NOTE — NURSING NOTE
"Behavior   Anxiety: Sleep disturbance  Depression: disturbed sleep  Pain   0  AVH   no  S/I   no  H/I   no  Affect   calm and pleasant    Patient has been out of room in day area, sitting with peers at table.  Orientated x4.  Denies SI and AVH.  Intermittent eye contact.  States \"I dont want to really talk about what brought me here. I just want to go to the [place], I am tired of being in this place\".  Reports that he made plans with his wife to sell or rent the house that he is living in and she is supposed to give him half of the money.  No needs or concerns voiced at this time.  PRN trazodone given per request, states that he has had trouble falling asleep.  Compliant with HS meds. Ate snack. Will continue to monitor.     Intervention  Medications reviewed and administered  Assessment complete    Response  Verbalized understanding   Took medications  Interacted with assessment    Plan  Will promote and reinforce current treatment plan and encourage involvement in care plan goals.   Will provide for safe, calm, quiet environment.  Will promote open communication with staff and foster a trusting/working relationship with patient.   Will promote participation in groups and therapies and independent reflection.      "

## 2018-03-07 NOTE — PLAN OF CARE
Problem: BH Overarching Goals  Goal: Adheres to Safety Considerations for Self and Others  Outcome: Ongoing (interventions implemented as appropriate)    Goal: Optimized Coping Skills in Response to Life Stressors  Outcome: Ongoing (interventions implemented as appropriate)    Goal: Develops/Participates in Therapeutic [place] to Support Successful Transition  Outcome: Ongoing (interventions implemented as appropriate)      Problem: Depression  Goal: Verbalize thoughts and feelings associated with:  Outcome: Ongoing (interventions implemented as appropriate)    Goal: Refrain from harming self  Outcome: Ongoing (interventions implemented as appropriate)    Goal: Refrain from isolation  Outcome: Ongoing (interventions implemented as appropriate)  Increase socialization, group therapy  Goal: Refrain from self-neglect  Outcome: Ongoing (interventions implemented as appropriate)  Encourage daily shower, self care  Goal: Attend and participate in unit activities, including therapeutic, recreational, and educational groups  Outcome: Ongoing (interventions implemented as appropriate)    Goal: Complete daily ADLs, including personal hygiene independently, as able  Outcome: Ongoing (interventions implemented as appropriate)      Problem: Risk for Self Injury/Neglect  Goal: Treatment Goal: Remain safe during length of stay, learn and adopt new coping skills, and be free of self-injurious ideation, impulses and acts at the time of discharge  Outcome: Ongoing (interventions implemented as appropriate)    Goal: Verbalize thoughts and feelings associated with:  Outcome: Ongoing (interventions implemented as appropriate)    Goal: Refrain from harming self  Outcome: Ongoing (interventions implemented as appropriate)    Goal: Attend and participate in unit activities, including therapeutic, recreational, and educational groups  Outcome: Ongoing (interventions implemented as appropriate)    Goal: Recognize maladaptive responses and  adopt new coping mechanisms  Outcome: Ongoing (interventions implemented as appropriate)    Goal: Complete daily ADLs, including personal hygiene independently, as able  Outcome: Ongoing (interventions implemented as appropriate)

## 2018-03-07 NOTE — NURSING NOTE
"Behavior   Anxiety: denies anxiety  Depression: Denies anxiety  Pain  0  AVH   no  S/I   no  H/I   no    Affect   calm and pleasant    Note:up most of the day. Denies anxiety/depression. 'I'm gonna go to the [facility]. I hope its clean. i'm gonna get rid of everything, I dont care any more. I cant take care of myself.\" medication compliant.  \"I'm not going to any groups. I dont like them.\"      Intervention  Instructed in medication usage and effects  Medications administered as ordered  Encouraged to verbalize needs notify staff of feelings of self harm      Response  Verbalized understanding   Did patient take medications as ordered yes   Did patient interact with assessment?  yes     Plan  Will monitor for safety  Will monitor every 15 minutes as ordered  Will evaluate and promote the plan of care    "

## 2018-03-07 NOTE — PLAN OF CARE
Problem: BH Patient Care Overview (Adult)  Goal: Plan of Care Review  Outcome: Ongoing (interventions implemented as appropriate)    Goal: Individualization and Mutuality  Outcome: Ongoing (interventions implemented as appropriate)    Goal: Discharge Needs Assessment  Outcome: Ongoing (interventions implemented as appropriate)      Problem: BH Overarching Goals  Goal: Adheres to Safety Considerations for Self and Others  Outcome: Ongoing (interventions implemented as appropriate)    Goal: Optimized Coping Skills in Response to Life Stressors  Outcome: Ongoing (interventions implemented as appropriate)    Goal: Develops/Participates in Therapeutic [place] to Support Successful Transition  Outcome: Ongoing (interventions implemented as appropriate)

## 2018-03-07 NOTE — PROGRESS NOTES
"3/7/2018    Chief Complaint: suicidal ideation    Subjective:  Patient is a 53 y.o. male who was hospitalized for suicidal ideation.   Since yesterday the patient has been stable.  He notes that he felt calmer and more even when he had his seroquel as 600mg qhs.  He has been accepted at [facility] pending a PPD result.      Objective     Vital Signs    Temp:  [96.8 °F (36 °C)-98.8 °F (37.1 °C)] 96.8 °F (36 °C)  Heart Rate:  [85-91] 85  Resp:  [16-18] 18  BP: (101-109)/(63-67) 101/67    Physical Exam:   General Appearance: alert, appears stated age and cooperative,  Hygiene:   good  Gait & Station: Normal  Musculoskeletal: No tremors or abnormal involuntary movements    Mental Status Exam:   Cooperation:  Cooperative  Eye Contact:  Good  Psychomotor Behavior:  Appropriate  Mood: \"Fine\"  Affect:  normal  Speech:  Normal  Thought Process:  Coherent  Associations: Goal Directed  Thought Content:     Normal   Suicidal:  None   Homicidal:  None   Hallucinations:  None   Delusion:  None  Cognitive Functioning:   Consciousness: awake, alert and oriented  Reliability:  fair  Insight:  Fair  Judgement:  Fair  Impulse Control:  Fair    Lab Results (last 24 hours)     ** No results found for the last 24 hours. **        Imaging Results (last 24 hours)     ** No results found for the last 24 hours. **          Medicine:   Current Facility-Administered Medications:   •  acetaminophen (TYLENOL) tablet 650 mg, 650 mg, Oral, Q4H PRN, Gino Haines MD  •  aluminum-magnesium hydroxide-simethicone (MAALOX MAX) 400-400-40 MG/5ML suspension 15 mL, 15 mL, Oral, Q6H PRN, Gino Haines MD, 15 mL at 03/07/18 0844  •  aspirin chewable tablet 81 mg, 81 mg, Oral, Daily, Gino Haines MD, 81 mg at 03/07/18 0837  •  atorvastatin (LIPITOR) tablet 20 mg, 20 mg, Oral, Nightly, Gino Haines MD, 20 mg at 03/06/18 2014  •  clopidogrel (PLAVIX) tablet 75 mg, 75 mg, Oral, Daily, Gino Haines MD, 75 mg at 03/07/18 0837  •  diphenhydrAMINE (BENADRYL) capsule 50 mg, " 50 mg, Oral, Q6H PRN, Gino Haines MD, 50 mg at 03/06/18 0749  •  diphenhydrAMINE (BENADRYL) capsule 50 mg, 50 mg, Oral, TID, Gerry Trinidad MD, 50 mg at 03/07/18 0837  •  donepezil (ARICEPT) tablet 10 mg, 10 mg, Oral, Nightly, Gino Haines MD, 10 mg at 03/06/18 2014  •  DULoxetine (CYMBALTA) DR capsule 90 mg, 90 mg, Oral, Daily, Gino Haines MD, 90 mg at 03/07/18 0838  •  hydrochlorothiazide (HYDRODIURIL) tablet 25 mg, 25 mg, Oral, Daily, Gino Haines MD, 25 mg at 03/07/18 0837  •  hydrocortisone 1 % cream, , Topical, Q12H, Gerry Trinidad MD  •  hydrOXYzine (VISTARIL) capsule 50 mg, 50 mg, Oral, Q6H PRN, Gino Haines MD, 50 mg at 03/05/18 0856  •  loperamide (IMODIUM) capsule 2 mg, 2 mg, Oral, 4x Daily PRN, Gino Haines MD  •  magnesium hydroxide (MILK OF MAGNESIA) suspension 2400 mg/10mL 10 mL, 10 mL, Oral, Daily PRN, Gino Haines MD  •  meclizine (ANTIVERT) tablet 12.5 mg, 12.5 mg, Oral, TID PRN, Gino Haines MD  •  metoprolol succinate XL (TOPROL-XL) 24 hr tablet 50 mg, 50 mg, Oral, Q24H, Gino Haines MD, 50 mg at 03/06/18 0804  •  pantoprazole (PROTONIX) EC tablet 20 mg, 20 mg, Oral, QAM AC, Gino Haines MD, 20 mg at 03/07/18 1030  •  QUEtiapine (SEROquel) tablet 100 mg, 100 mg, Oral, Daily, Gerry Trinidad MD, 100 mg at 03/07/18 0838  •  QUEtiapine (SEROquel) tablet 400 mg, 400 mg, Oral, Nightly, Gerry Trinidad MD, 400 mg at 03/06/18 2014  •  terazosin (HYTRIN) capsule 2 mg, 2 mg, Oral, Nightly, Gino Haines MD, 2 mg at 03/06/18 2014  •  traZODone (DESYREL) tablet 50 mg, 50 mg, Oral, Nightly PRN, Gino Haines MD, 50 mg at 03/06/18 2014  •  triamcinolone (KENALOG) 0.1 % ointment 1 application, 1 application, Topical, Q12H, Gino Haines MD, 1 application at 03/07/18 1000    Diagnoses/Assessment:   Principal Problem:    Severe episode of recurrent major depressive disorder, with psychotic features  Active Problems:    Dementia in Alzheimer's disease with early onset    Suicide ideation      Treatment  Plan:    1) Will continue care for the patient on the behavioral health unit at [facility] to ensure patient safety.  2) Will continue to provide treatment with the unit milieu, activities, therapies and psychopharmacological management.  3) Patient to be placed on or continued on  Q15 minute checks  and Suicide precautions.  4) Pertinent medical issues: No active medical concerns.  5) Will order following labs: none  6) Will make the following medication changes: Will change the seroquel to 600mg qhs.  Will continue the aricept, cymbalta and prn trazodone.  7) Will continue discharge planning as appropriate for patient.  8) Psychotherapy provided: none    Treatment plan and medication risks and benefits discussed with: Patient    Gerry Trinidad MD  03/07/18  1:45 PM

## 2018-03-08 VITALS
DIASTOLIC BLOOD PRESSURE: 73 MMHG | BODY MASS INDEX: 26.88 KG/M2 | HEIGHT: 73 IN | WEIGHT: 202.8 LBS | HEART RATE: 87 BPM | RESPIRATION RATE: 18 BRPM | TEMPERATURE: 97.6 F | OXYGEN SATURATION: 97 % | SYSTOLIC BLOOD PRESSURE: 111 MMHG

## 2018-03-08 PROBLEM — R45.851 SUICIDE IDEATION: Status: RESOLVED | Noted: 2018-03-05 | Resolved: 2018-03-08

## 2018-03-08 PROCEDURE — 63710000001 DIPHENHYDRAMINE PER 50 MG: Performed by: PSYCHIATRY & NEUROLOGY

## 2018-03-08 PROCEDURE — 99238 HOSP IP/OBS DSCHRG MGMT 30/<: CPT | Performed by: PSYCHIATRY & NEUROLOGY

## 2018-03-08 RX ORDER — TRIAMCINOLONE ACETONIDE 1 MG/G
CREAM TOPICAL EVERY 12 HOURS SCHEDULED
Qty: 15 G | Refills: 0 | Status: ON HOLD | OUTPATIENT
Start: 2018-03-08 | End: 2018-04-20

## 2018-03-08 RX ORDER — ATORVASTATIN CALCIUM 20 MG/1
20 TABLET, FILM COATED ORAL NIGHTLY
Qty: 7 TABLET | Refills: 0 | Status: ON HOLD | OUTPATIENT
Start: 2018-03-08 | End: 2018-04-20

## 2018-03-08 RX ORDER — DULOXETIN HYDROCHLORIDE 30 MG/1
90 CAPSULE, DELAYED RELEASE ORAL DAILY
Qty: 21 CAPSULE | Refills: 0 | Status: ON HOLD | OUTPATIENT
Start: 2018-03-08 | End: 2018-04-20

## 2018-03-08 RX ORDER — TERAZOSIN 2 MG/1
2 CAPSULE ORAL NIGHTLY
Qty: 7 CAPSULE | Refills: 0 | Status: ON HOLD | OUTPATIENT
Start: 2018-03-08 | End: 2018-04-20

## 2018-03-08 RX ORDER — ASPIRIN 81 MG/1
81 TABLET, CHEWABLE ORAL DAILY
Qty: 7 TABLET | Refills: 0 | Status: ON HOLD | OUTPATIENT
Start: 2018-03-08 | End: 2018-04-20

## 2018-03-08 RX ORDER — METOPROLOL SUCCINATE 50 MG/1
50 TABLET, EXTENDED RELEASE ORAL DAILY
Qty: 7 TABLET | Refills: 0 | Status: ON HOLD | OUTPATIENT
Start: 2018-03-08 | End: 2018-04-20

## 2018-03-08 RX ORDER — QUETIAPINE FUMARATE 300 MG/1
600 TABLET, FILM COATED ORAL NIGHTLY
Qty: 14 TABLET | Refills: 0 | Status: ON HOLD | OUTPATIENT
Start: 2018-03-08 | End: 2018-04-20

## 2018-03-08 RX ORDER — CLOPIDOGREL BISULFATE 75 MG/1
75 TABLET ORAL DAILY
Qty: 7 TABLET | Refills: 0 | Status: ON HOLD | OUTPATIENT
Start: 2018-03-08 | End: 2018-04-20

## 2018-03-08 RX ORDER — PANTOPRAZOLE SODIUM 40 MG/1
40 TABLET, DELAYED RELEASE ORAL DAILY
Qty: 7 TABLET | Refills: 0 | Status: ON HOLD | OUTPATIENT
Start: 2018-03-08 | End: 2018-04-20

## 2018-03-08 RX ORDER — HYDROCHLOROTHIAZIDE 25 MG/1
25 TABLET ORAL DAILY
Qty: 7 TABLET | Refills: 0 | Status: ON HOLD | OUTPATIENT
Start: 2018-03-08 | End: 2018-04-20

## 2018-03-08 RX ORDER — DIPHENHYDRAMINE HCL 50 MG
50 CAPSULE ORAL EVERY 6 HOURS PRN
Qty: 28 CAPSULE | Refills: 0 | Status: ON HOLD | OUTPATIENT
Start: 2018-03-08 | End: 2018-04-20

## 2018-03-08 RX ORDER — DONEPEZIL HYDROCHLORIDE 10 MG/1
10 TABLET, FILM COATED ORAL NIGHTLY
Qty: 7 TABLET | Refills: 0 | Status: ON HOLD | OUTPATIENT
Start: 2018-03-08 | End: 2018-04-20

## 2018-03-08 RX ADMIN — TRIAMCINOLONE ACETONIDE 1 APPLICATION: 1 OINTMENT TOPICAL at 08:10

## 2018-03-08 RX ADMIN — DULOXETINE HYDROCHLORIDE 90 MG: 30 CAPSULE, DELAYED RELEASE ORAL at 08:08

## 2018-03-08 RX ADMIN — PANTOPRAZOLE SODIUM 20 MG: 20 TABLET, DELAYED RELEASE ORAL at 08:08

## 2018-03-08 RX ADMIN — CLOPIDOGREL BISULFATE 75 MG: 75 TABLET ORAL at 08:08

## 2018-03-08 RX ADMIN — DIPHENHYDRAMINE HYDROCHLORIDE 50 MG: 50 CAPSULE ORAL at 08:08

## 2018-03-08 RX ADMIN — HYDROCORTISONE: 1 CREAM TOPICAL at 08:08

## 2018-03-08 RX ADMIN — HYDROCHLOROTHIAZIDE 25 MG: 25 TABLET ORAL at 08:08

## 2018-03-08 RX ADMIN — DIPHENHYDRAMINE HYDROCHLORIDE 50 MG: 50 CAPSULE ORAL at 08:00

## 2018-03-08 RX ADMIN — ASPIRIN 81 MG 81 MG: 81 TABLET ORAL at 08:08

## 2018-03-08 NOTE — DISCHARGE SUMMARY
Pt presented in interview room dressed appropriately. Mood was calm, affect was  flat. Pt. wanted to leave as soon as possible. Pt. seems to be at baseline. Raven from [facility] came and have then pt. with her. Pt. was giving specific answers, when CSW inquired about any concerns he has,. Pt. stated that, “ no I want to leave, my wife will take care of my house, I just want to go out” Pt denies SI/Hi, AVH. CSW educated pt. on Crisis Hotline.. BPRS was completed upon dc. Pt. did not participated actively in group and individual therapy

## 2018-03-08 NOTE — NURSING NOTE
"Behavior   Anxiety: denies anxiety  Depression: Denies depression  Pain  0  AVH   no  S/I   no  H/I   no    Affect   calm and pleasant    Note:up in room calm and  Cooperative. \"I'm ready to get out of here.\"good eye contact. \"I dont like the groups. I dont want to go. I just want to leave.\" medication compliant. Denies itching.      Intervention  Instructed in medication usage and effects  Medications administered as ordered  Encouraged to verbalize needs. Encouraged pt to notify staff of feelings of self harm.      Response  Verbalized understanding   Did patient take medications as ordered yes   Did patient interact with assessment?  yes     Plan  Will monitor for safety  Will monitor every 15 minutes as ordered  Will evaluate and promote the plan of care  "

## 2018-03-08 NOTE — PLAN OF CARE
Problem: BH Patient Care Overview (Adult)  Goal: Plan of Care Review  Outcome: Ongoing (interventions implemented as appropriate)    Goal: Interdisciplinary Rounds/Family Conference  Outcome: Ongoing (interventions implemented as appropriate)    Goal: Individualization and Mutuality  Outcome: Ongoing (interventions implemented as appropriate)    Goal: Discharge Needs Assessment  Outcome: Ongoing (interventions implemented as appropriate)      Problem: BH Overarching Goals  Goal: Adheres to Safety Considerations for Self and Others  Outcome: Ongoing (interventions implemented as appropriate)    Goal: Optimized Coping Skills in Response to Life Stressors  Outcome: Ongoing (interventions implemented as appropriate)    Goal: Develops/Participates in Therapeutic [place] to Support Successful Transition  Outcome: Ongoing (interventions implemented as appropriate)  Refusing group

## 2018-03-08 NOTE — NURSING NOTE
Pt discharged back to [facility] at this time; ambulated to the exit, where staff from [facility] was waiting; all personal items were returned prior to departure; prescriptions and discharge information / paperwork was given to staff member from [facility]; TB skin test results were communicated verbally and given in paper form; no needs prior to departure.

## 2018-03-08 NOTE — NURSING NOTE
Behavior   Anxiety: Feeling worried  Depression: insomnia  Pain  0  AVH   no  S/I   no  H/I   no    Affect   Labile    Note:pt at desk x 2 for medications stated will be going to [place] tomorrow, stated not anxious concerning about leaving but wants to get out of here and not ever come back.  Pt made good eye contact, to room after medications given, pt stated itching improving, feels allergic to bleach used on sheets.      Intervention  Instructed in medication usage and effects  Medications administered as ordered  Encouraged to verbalize needs      Response  Verbalized understanding   Did patient take medications as ordered yes   Did patient interact with assessment?  yes     Plan  Will monitor for safety  Will monitor every 15 minutes as ordered  Will evaluate and promote the plan of care
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
## 2018-04-11 ENCOUNTER — HOSPITAL ENCOUNTER (INPATIENT)
Facility: HOSPITAL | Age: 53
LOS: 9 days | Discharge: HOME | End: 2018-04-20
Attending: PSYCHIATRY & NEUROLOGY | Admitting: PSYCHIATRY & NEUROLOGY

## 2018-04-11 ENCOUNTER — APPOINTMENT (OUTPATIENT)
Dept: GENERAL RADIOLOGY | Facility: HOSPITAL | Age: 53
End: 2018-04-11

## 2018-04-11 ENCOUNTER — HOSPITAL ENCOUNTER (EMERGENCY)
Facility: HOSPITAL | Age: 53
Discharge: PSYCHIATRIC OR SUBSTANCE USE FACILITY | End: 2018-04-11
Attending: EMERGENCY MEDICINE | Admitting: EMERGENCY MEDICINE

## 2018-04-11 VITALS
HEIGHT: 73 IN | HEART RATE: 93 BPM | DIASTOLIC BLOOD PRESSURE: 74 MMHG | RESPIRATION RATE: 18 BRPM | OXYGEN SATURATION: 98 % | TEMPERATURE: 98.4 F | WEIGHT: 215 LBS | SYSTOLIC BLOOD PRESSURE: 148 MMHG | BODY MASS INDEX: 28.49 KG/M2

## 2018-04-11 DIAGNOSIS — R45.851 SUICIDAL IDEATION: Primary | ICD-10-CM

## 2018-04-11 DIAGNOSIS — L30.9 DERMATITIS: ICD-10-CM

## 2018-04-11 DIAGNOSIS — T50.902A INTENTIONAL DRUG OVERDOSE, INITIAL ENCOUNTER (HCC): ICD-10-CM

## 2018-04-11 LAB
ALBUMIN SERPL-MCNC: 4.4 G/DL (ref 3.4–4.8)
ALBUMIN/GLOB SERPL: 1.5 G/DL (ref 1.1–1.8)
ALP SERPL-CCNC: 120 U/L (ref 38–126)
ALT SERPL W P-5'-P-CCNC: 48 U/L (ref 21–72)
AMPHET+METHAMPHET UR QL: NEGATIVE
ANION GAP SERPL CALCULATED.3IONS-SCNC: 14 MMOL/L (ref 5–15)
APAP SERPL-MCNC: <10 MCG/ML (ref 10–30)
APTT PPP: 29.2 SECONDS (ref 20–40.3)
AST SERPL-CCNC: 27 U/L (ref 17–59)
BARBITURATES UR QL SCN: NEGATIVE
BASOPHILS # BLD AUTO: 0.02 10*3/MM3 (ref 0–0.2)
BASOPHILS NFR BLD AUTO: 0.3 % (ref 0–2)
BENZODIAZ UR QL SCN: NEGATIVE
BILIRUB SERPL-MCNC: 0.7 MG/DL (ref 0.2–1.3)
BUN BLD-MCNC: 11 MG/DL (ref 7–21)
BUN/CREAT SERPL: 12.8 (ref 7–25)
CALCIUM SPEC-SCNC: 10.4 MG/DL (ref 8.4–10.2)
CANNABINOIDS SERPL QL: NEGATIVE
CHLORIDE SERPL-SCNC: 101 MMOL/L (ref 95–110)
CO2 SERPL-SCNC: 28 MMOL/L (ref 22–31)
COCAINE UR QL: NEGATIVE
CREAT BLD-MCNC: 0.86 MG/DL (ref 0.7–1.3)
DEPRECATED RDW RBC AUTO: 42.8 FL (ref 35.1–43.9)
EOSINOPHIL # BLD AUTO: 0.23 10*3/MM3 (ref 0–0.7)
EOSINOPHIL NFR BLD AUTO: 2.9 % (ref 0–7)
ERYTHROCYTE [DISTWIDTH] IN BLOOD BY AUTOMATED COUNT: 12.9 % (ref 11.5–14.5)
ETHANOL BLD-MCNC: <10 MG/DL (ref 0–10)
ETHANOL UR QL: <0.01 %
GFR SERPL CREATININE-BSD FRML MDRD: 93 ML/MIN/1.73 (ref 56–130)
GLOBULIN UR ELPH-MCNC: 3 GM/DL (ref 2.3–3.5)
GLUCOSE BLD-MCNC: 73 MG/DL (ref 60–100)
GLUCOSE BLDC GLUCOMTR-MCNC: 74 MG/DL (ref 70–130)
HCT VFR BLD AUTO: 48.1 % (ref 39–49)
HGB BLD-MCNC: 16.8 G/DL (ref 13.7–17.3)
HOLD SPECIMEN: NORMAL
HOLD SPECIMEN: NORMAL
IMM GRANULOCYTES # BLD: 0.02 10*3/MM3 (ref 0–0.02)
IMM GRANULOCYTES NFR BLD: 0.3 % (ref 0–0.5)
INR PPP: 1.04 (ref 0.8–1.2)
LYMPHOCYTES # BLD AUTO: 1.89 10*3/MM3 (ref 0.6–4.2)
LYMPHOCYTES NFR BLD AUTO: 23.7 % (ref 10–50)
MCH RBC QN AUTO: 32 PG (ref 26.5–34)
MCHC RBC AUTO-ENTMCNC: 34.9 G/DL (ref 31.5–36.3)
MCV RBC AUTO: 91.6 FL (ref 80–98)
METHADONE UR QL SCN: NEGATIVE
MONOCYTES # BLD AUTO: 0.6 10*3/MM3 (ref 0–0.9)
MONOCYTES NFR BLD AUTO: 7.5 % (ref 0–12)
NEUTROPHILS # BLD AUTO: 5.23 10*3/MM3 (ref 2–8.6)
NEUTROPHILS NFR BLD AUTO: 65.3 % (ref 37–80)
OPIATES UR QL: NEGATIVE
OXYCODONE UR QL SCN: NEGATIVE
PLATELET # BLD AUTO: 179 10*3/MM3 (ref 150–450)
PMV BLD AUTO: 10.9 FL (ref 8–12)
POTASSIUM BLD-SCNC: 3.6 MMOL/L (ref 3.5–5.1)
PROT SERPL-MCNC: 7.4 G/DL (ref 6.3–8.6)
PROTHROMBIN TIME: 13.4 SECONDS (ref 11.1–15.3)
RBC # BLD AUTO: 5.25 10*6/MM3 (ref 4.37–5.74)
SALICYLATES SERPL-MCNC: <1 MG/DL (ref 10–20)
SODIUM BLD-SCNC: 143 MMOL/L (ref 137–145)
T4 FREE SERPL-MCNC: 1.01 NG/DL (ref 0.78–2.19)
TROPONIN I SERPL-MCNC: <0.012 NG/ML
TSH SERPL DL<=0.05 MIU/L-ACNC: 1.29 MIU/ML (ref 0.46–4.68)
WBC NRBC COR # BLD: 7.99 10*3/MM3 (ref 3.2–9.8)
WHOLE BLOOD HOLD SPECIMEN: NORMAL
WHOLE BLOOD HOLD SPECIMEN: NORMAL

## 2018-04-11 PROCEDURE — 63710000001 DIPHENHYDRAMINE PER 50 MG: Performed by: PSYCHIATRY & NEUROLOGY

## 2018-04-11 PROCEDURE — 80307 DRUG TEST PRSMV CHEM ANLYZR: CPT | Performed by: EMERGENCY MEDICINE

## 2018-04-11 PROCEDURE — 85610 PROTHROMBIN TIME: CPT | Performed by: EMERGENCY MEDICINE

## 2018-04-11 PROCEDURE — 93010 ELECTROCARDIOGRAM REPORT: CPT | Performed by: INTERNAL MEDICINE

## 2018-04-11 PROCEDURE — 84484 ASSAY OF TROPONIN QUANT: CPT | Performed by: EMERGENCY MEDICINE

## 2018-04-11 PROCEDURE — 84439 ASSAY OF FREE THYROXINE: CPT | Performed by: EMERGENCY MEDICINE

## 2018-04-11 PROCEDURE — 84443 ASSAY THYROID STIM HORMONE: CPT | Performed by: EMERGENCY MEDICINE

## 2018-04-11 PROCEDURE — 36415 COLL VENOUS BLD VENIPUNCTURE: CPT

## 2018-04-11 PROCEDURE — 82962 GLUCOSE BLOOD TEST: CPT

## 2018-04-11 PROCEDURE — 71045 X-RAY EXAM CHEST 1 VIEW: CPT

## 2018-04-11 PROCEDURE — 85025 COMPLETE CBC W/AUTO DIFF WBC: CPT | Performed by: EMERGENCY MEDICINE

## 2018-04-11 PROCEDURE — 99284 EMERGENCY DEPT VISIT MOD MDM: CPT

## 2018-04-11 PROCEDURE — 85730 THROMBOPLASTIN TIME PARTIAL: CPT | Performed by: EMERGENCY MEDICINE

## 2018-04-11 PROCEDURE — 80053 COMPREHEN METABOLIC PANEL: CPT | Performed by: EMERGENCY MEDICINE

## 2018-04-11 PROCEDURE — 93005 ELECTROCARDIOGRAM TRACING: CPT | Performed by: EMERGENCY MEDICINE

## 2018-04-11 RX ORDER — HYDROXYZINE PAMOATE 25 MG/1
50 CAPSULE ORAL EVERY 6 HOURS PRN
Status: DISCONTINUED | OUTPATIENT
Start: 2018-04-11 | End: 2018-04-11 | Stop reason: SDUPTHER

## 2018-04-11 RX ORDER — ATENOLOL 50 MG/1
50 TABLET ORAL DAILY
Status: DISCONTINUED | OUTPATIENT
Start: 2018-04-12 | End: 2018-04-20 | Stop reason: HOSPADM

## 2018-04-11 RX ORDER — DIPHENHYDRAMINE HCL 50 MG
50 CAPSULE ORAL EVERY 6 HOURS PRN
Status: DISCONTINUED | OUTPATIENT
Start: 2018-04-11 | End: 2018-04-20 | Stop reason: HOSPADM

## 2018-04-11 RX ORDER — CLONIDINE HYDROCHLORIDE 0.1 MG/1
0.1 TABLET ORAL EVERY 4 HOURS PRN
Status: DISCONTINUED | OUTPATIENT
Start: 2018-04-11 | End: 2018-04-20 | Stop reason: HOSPADM

## 2018-04-11 RX ORDER — ATENOLOL 50 MG/1
50 TABLET ORAL DAILY
COMMUNITY
End: 2018-04-20 | Stop reason: HOSPADM

## 2018-04-11 RX ORDER — HYDROXYZINE PAMOATE 50 MG/1
50 CAPSULE ORAL EVERY 6 HOURS PRN
Status: DISCONTINUED | OUTPATIENT
Start: 2018-04-11 | End: 2018-04-11

## 2018-04-11 RX ORDER — TERAZOSIN 2 MG/1
2 CAPSULE ORAL NIGHTLY
Status: DISCONTINUED | OUTPATIENT
Start: 2018-04-11 | End: 2018-04-20 | Stop reason: HOSPADM

## 2018-04-11 RX ORDER — ACETAMINOPHEN 325 MG/1
650 TABLET ORAL EVERY 4 HOURS PRN
Status: DISCONTINUED | OUTPATIENT
Start: 2018-04-11 | End: 2018-04-11 | Stop reason: SDUPTHER

## 2018-04-11 RX ORDER — TRAZODONE HYDROCHLORIDE 50 MG/1
50 TABLET ORAL NIGHTLY PRN
Status: DISCONTINUED | OUTPATIENT
Start: 2018-04-11 | End: 2018-04-20 | Stop reason: HOSPADM

## 2018-04-11 RX ORDER — PANTOPRAZOLE SODIUM 40 MG/1
40 TABLET, DELAYED RELEASE ORAL DAILY
Status: DISCONTINUED | OUTPATIENT
Start: 2018-04-12 | End: 2018-04-20 | Stop reason: HOSPADM

## 2018-04-11 RX ORDER — ASPIRIN 81 MG/1
81 TABLET, CHEWABLE ORAL DAILY
Status: DISCONTINUED | OUTPATIENT
Start: 2018-04-12 | End: 2018-04-20 | Stop reason: HOSPADM

## 2018-04-11 RX ORDER — DONEPEZIL HYDROCHLORIDE 10 MG/1
10 TABLET, FILM COATED ORAL NIGHTLY
Status: DISCONTINUED | OUTPATIENT
Start: 2018-04-11 | End: 2018-04-20 | Stop reason: HOSPADM

## 2018-04-11 RX ORDER — CLONIDINE HYDROCHLORIDE 0.1 MG/1
0.1 TABLET ORAL EVERY 4 HOURS PRN
Status: DISCONTINUED | OUTPATIENT
Start: 2018-04-11 | End: 2018-04-11 | Stop reason: HOSPADM

## 2018-04-11 RX ORDER — QUETIAPINE FUMARATE 300 MG/1
600 TABLET, FILM COATED ORAL NIGHTLY
Status: DISCONTINUED | OUTPATIENT
Start: 2018-04-11 | End: 2018-04-20 | Stop reason: HOSPADM

## 2018-04-11 RX ORDER — ONDANSETRON 4 MG/1
4 TABLET, FILM COATED ORAL EVERY 6 HOURS PRN
Status: DISCONTINUED | OUTPATIENT
Start: 2018-04-11 | End: 2018-04-20 | Stop reason: HOSPADM

## 2018-04-11 RX ORDER — ALPRAZOLAM 2 MG/1
1 TABLET ORAL 4 TIMES DAILY
COMMUNITY
Start: 2017-01-13 | End: 2018-04-20 | Stop reason: HOSPADM

## 2018-04-11 RX ORDER — SODIUM CHLORIDE 0.9 % (FLUSH) 0.9 %
10 SYRINGE (ML) INJECTION AS NEEDED
Status: DISCONTINUED | OUTPATIENT
Start: 2018-04-11 | End: 2018-04-11 | Stop reason: HOSPADM

## 2018-04-11 RX ORDER — LOPERAMIDE HYDROCHLORIDE 2 MG/1
2 CAPSULE ORAL 4 TIMES DAILY PRN
Status: DISCONTINUED | OUTPATIENT
Start: 2018-04-11 | End: 2018-04-11 | Stop reason: SDUPTHER

## 2018-04-11 RX ORDER — METOPROLOL SUCCINATE 50 MG/1
50 TABLET, EXTENDED RELEASE ORAL DAILY
Status: DISCONTINUED | OUTPATIENT
Start: 2018-04-12 | End: 2018-04-20 | Stop reason: HOSPADM

## 2018-04-11 RX ORDER — ALUMINA, MAGNESIA, AND SIMETHICONE 2400; 2400; 240 MG/30ML; MG/30ML; MG/30ML
15 SUSPENSION ORAL EVERY 6 HOURS PRN
Status: DISCONTINUED | OUTPATIENT
Start: 2018-04-11 | End: 2018-04-11 | Stop reason: SDUPTHER

## 2018-04-11 RX ORDER — ATORVASTATIN CALCIUM 20 MG/1
20 TABLET, FILM COATED ORAL NIGHTLY
Status: DISCONTINUED | OUTPATIENT
Start: 2018-04-11 | End: 2018-04-20 | Stop reason: HOSPADM

## 2018-04-11 RX ORDER — ALUMINA, MAGNESIA, AND SIMETHICONE 2400; 2400; 240 MG/30ML; MG/30ML; MG/30ML
15 SUSPENSION ORAL EVERY 6 HOURS PRN
Status: DISCONTINUED | OUTPATIENT
Start: 2018-04-11 | End: 2018-04-20 | Stop reason: HOSPADM

## 2018-04-11 RX ORDER — CLOPIDOGREL BISULFATE 75 MG/1
75 TABLET ORAL DAILY
Status: DISCONTINUED | OUTPATIENT
Start: 2018-04-12 | End: 2018-04-20 | Stop reason: HOSPADM

## 2018-04-11 RX ORDER — TRAZODONE HYDROCHLORIDE 50 MG/1
50 TABLET ORAL NIGHTLY PRN
Status: DISCONTINUED | OUTPATIENT
Start: 2018-04-11 | End: 2018-04-11 | Stop reason: SDUPTHER

## 2018-04-11 RX ORDER — ONDANSETRON 4 MG/1
4 TABLET, ORALLY DISINTEGRATING ORAL EVERY 6 HOURS PRN
Status: DISCONTINUED | OUTPATIENT
Start: 2018-04-11 | End: 2018-04-11 | Stop reason: HOSPADM

## 2018-04-11 RX ORDER — TRIAMCINOLONE ACETONIDE 1 MG/G
CREAM TOPICAL EVERY 12 HOURS SCHEDULED
Status: DISCONTINUED | OUTPATIENT
Start: 2018-04-11 | End: 2018-04-12

## 2018-04-11 RX ORDER — LOPERAMIDE HYDROCHLORIDE 2 MG/1
2 CAPSULE ORAL 4 TIMES DAILY PRN
Status: DISCONTINUED | OUTPATIENT
Start: 2018-04-11 | End: 2018-04-20 | Stop reason: HOSPADM

## 2018-04-11 RX ORDER — ACETAMINOPHEN 325 MG/1
650 TABLET ORAL EVERY 4 HOURS PRN
Status: DISCONTINUED | OUTPATIENT
Start: 2018-04-11 | End: 2018-04-20 | Stop reason: HOSPADM

## 2018-04-11 RX ORDER — HYDROCHLOROTHIAZIDE 25 MG/1
25 TABLET ORAL DAILY
Status: DISCONTINUED | OUTPATIENT
Start: 2018-04-12 | End: 2018-04-20 | Stop reason: HOSPADM

## 2018-04-11 RX ORDER — DOCUSATE SODIUM 100 MG/1
100 CAPSULE, LIQUID FILLED ORAL 2 TIMES DAILY PRN
Status: DISCONTINUED | OUTPATIENT
Start: 2018-04-11 | End: 2018-04-20 | Stop reason: HOSPADM

## 2018-04-11 RX ADMIN — HYDROXYZINE PAMOATE 50 MG: 50 CAPSULE ORAL at 20:16

## 2018-04-11 RX ADMIN — DIPHENHYDRAMINE HYDROCHLORIDE 50 MG: 50 CAPSULE ORAL at 22:00

## 2018-04-11 RX ADMIN — QUETIAPINE FUMARATE 600 MG: 300 TABLET ORAL at 22:11

## 2018-04-11 RX ADMIN — ATORVASTATIN CALCIUM 20 MG: 20 TABLET, FILM COATED ORAL at 22:13

## 2018-04-11 RX ADMIN — TRIAMCINOLONE ACETONIDE: 1 CREAM TOPICAL at 22:12

## 2018-04-11 RX ADMIN — Medication 10 ML: at 16:18

## 2018-04-11 RX ADMIN — TRAZODONE HYDROCHLORIDE 50 MG: 50 TABLET ORAL at 20:16

## 2018-04-11 RX ADMIN — DONEPEZIL HYDROCHLORIDE 10 MG: 10 TABLET ORAL at 22:11

## 2018-04-11 RX ADMIN — TERAZOSIN HYDROCHLORIDE ANHYDROUS 2 MG: 2 CAPSULE ORAL at 22:11

## 2018-04-11 NOTE — ED NOTES
"Spoke with [employer] employee in [place] where pt reports he obtained \"sleep aid\" tablets she reports they carry 2 bottles that are 24 tabs and 16 tablets and the ingredient is melatonin and inert ingredients    Reported to poison control that pt states ingestion of 50 tablets 1 hour prior to arrival in ed  Poison control states melatonin onset of action is 1 hour and duration of action is 1 hour, they report that observation only would be needed for melatonin, poison control also states that activated charcoal could be given at this point,  Poison control also recommends r/o asa and tylenol ingestion and also monitor heart rate in case benadryl was ingested   Spoke with greg nava @ poison control      Charisma Yin RN  04/11/18 [id number]    "

## 2018-04-11 NOTE — ED NOTES
Pt ask for medication for the itching, Dr. whalen was informed     Shelia Szymanski, RNA  04/11/18 0044

## 2018-04-11 NOTE — PLAN OF CARE
Problem: Patient Care Overview  Goal: Plan of Care Review  Outcome: Ongoing (interventions implemented as appropriate)      Problem: Suicidal Behavior (Adult)  Goal: Suicidal Behavior is Absent/Minimized/Managed  Outcome: Ongoing (interventions implemented as appropriate)

## 2018-04-11 NOTE — ED NOTES
Pt has rash and itching in the arms, stomach and back and whole body     Shelia Szymanski, RNA  04/11/18 7232

## 2018-04-11 NOTE — ED NOTES
Per dr rome pt accepted to behavioral health, he asks that dr whalen give him some suggestions as to tx of pt's chronic rash and itching which he states is worse than usual   Dr whalen notified of this message      Charisma Yin RN  04/11/18 8431

## 2018-04-11 NOTE — NURSING NOTE
Patient arrived via w/c from ED to be admitted to room 665 at 1824 for dx of SI under the care of Dr. Trinidad.  Patient has flat affect and is having a hard time concentrating and staying on subject.  He has a red raised rash to chest, back and all extremities which patient states are itching.  Patient states that he cannot sleep due to his itching and he states he has been taking benadryl which is not helping him.  Patient states he has not slept for a week due to the itching.  Explained unit routine to patient.  Initiate care plans.  Monitor as ordered to maintain patient safety.

## 2018-04-11 NOTE — ED PROVIDER NOTES
Subjective   53 years old male with history of anxiety, depression, bipolar disorder, hypertension, hyperlipidemia, previous suicidal attempts/admission to the behavioral health presented in the ER after 50 Sleep-Aid pills ingestion.  He developed almost an hour prior to arrival.  He was feeling sleepy after that.  He doesn't know the name of ingredients but it's just a Sleep-Aid over-the-counter.  [employer] is called and came to know that it was melatonin.  He denies any chest pain, palpitations or shortness of breath.  Patient reports that he took it because he was feeling hopeless/helplessness and his depression was getting worse and wanted to die.  He has tried suicide attempt in the past also.        History provided by:  Patient    Associated symptoms: no chest pain, no congestion, no nausea, no shortness of breath, no sore throat and no vomiting    Ingestion   Ingested substance:  OTC medication  Time since incident:  2 hours  Ingestion amount:  50 sleep aid   Witnesses present: no    Called poison control: yes    Incident location: [facility].  Context: intentional ingestion and suicide attempt    Associated symptoms: anxiety    Associated symptoms: no agitation, no altered mental status, no chest pain, no diaphoresis, no nausea, no shortness of breath, no slurred speech, no unresponsiveness, no visual changes and no vomiting    Risk factors: hx of suicide attempts        Review of Systems   Constitutional: Negative for diaphoresis.   HENT: Negative for congestion, nosebleeds, sinus pressure and sore throat.    Eyes: Negative for redness.   Respiratory: Negative for chest tightness and shortness of breath.    Cardiovascular: Negative for chest pain.   Gastrointestinal: Negative for abdominal distention, nausea and vomiting.   Genitourinary: Negative for flank pain.   Musculoskeletal: Negative for gait problem.   Neurological: Negative for dizziness, syncope and light-headedness.  "  Psychiatric/Behavioral: Negative for agitation. The patient is nervous/anxious.        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       Family History   Problem Relation Age of Onset   • Bipolar disorder Mother    • Dementia Father        Social History     Social History   • Marital status:      Social History Main Topics   • Smoking status: Former Smoker     Packs/day: 1.00     Years: 20.00   • Smokeless tobacco: Never Used   • Alcohol use No   • Drug use: No   • Sexual activity: Defer     Other Topics Concern   • Not on file     Social History Narrative    Substance Abuse: Alcohol: does not drink,  Cannabis: \"40yrs ago\", Methamphetamine: does not use, Opiate: does not use, Cocaine: does not use, Synthetic: does not use and IV drug use: denies; he has been on high dose benzodiazepine from prescription for years.  These were tapered off at the [facility] last month.        Marriages: 7    Current Relationships:  but living separately for about 3 yrs    Children: 3        Education: some college     Occupation: on disability; he was a  for 27yrs.    Living Situation: alone           Objective   Physical Exam   Constitutional: He is oriented to person, place, and time. He appears well-developed and well-nourished.   HENT:   Head: Normocephalic and atraumatic.   Nose: Nose normal.   Mouth/Throat: Oropharynx is clear and moist.   Eyes: Conjunctivae and EOM are normal. Pupils are equal, round, and reactive to light.   Neck: Normal range of motion. Neck supple.   Cardiovascular: Normal rate, regular rhythm and normal heart sounds.    Pulmonary/Chest: Effort normal and breath sounds normal. No respiratory distress. He " has no wheezes.   Abdominal: Soft. There is no tenderness. There is no guarding.   Musculoskeletal: Normal range of motion. He exhibits no edema or deformity.   Neurological: He is alert and oriented to person, place, and time. He displays normal reflexes. No cranial nerve deficit or sensory deficit. He exhibits normal muscle tone. Coordination normal. GCS eye subscore is 4. GCS verbal subscore is 5. GCS motor subscore is 6. He displays no Babinski's sign on the right side. He displays no Babinski's sign on the left side.   Reflex Scores:       Bicep reflexes are 2+ on the right side and 2+ on the left side.       Patellar reflexes are 2+ on the right side and 2+ on the left side.  Skin: Skin is warm and dry. Capillary refill takes less than 2 seconds. Rash noted.   Multiple itch marks on the legs, groins in the back/arms.   Psychiatric: His affect is blunt. He exhibits a depressed mood. He expresses suicidal ideation. He expresses suicidal plans.   Nursing note and vitals reviewed.      ECG 12 Lead    Date/Time: 4/11/2018 2:42 PM  Performed by: LISSETTE DIAZ  Authorized by: LISSETTE DIAZ   Interpreted by physician  Rhythm: sinus rhythm  Rate: normal  BPM: 94  QRS axis: normal  Conduction: conduction normal  ST Segments: ST segments normal  T Waves: T waves normal  Other: no other findings  Clinical impression: normal ECG                 ED Course  ED Course                  MDM  Number of Diagnoses or Management Options  Dermatitis:   Intentional drug overdose, initial encounter:   Suicidal ideation:   Diagnosis management comments: 53 years old is evaluated with overdose possible melatonin.  Positive control was called who recommended activated charcoal but patient refused that.  They also recommended observation for next couple of hours because melatonin does not last very long.  He is totally awake and alert with no signs of distress. He is not altered at all throughout his stay in the ER.  Routine overdose workup  is done which is negative including EKG and troponin.  No chest x-ray findings.  He is medically cleared and behavioral health has evaluated patient and patient is being admitted to psychiatric floor for further evaluation and management.         Amount and/or Complexity of Data Reviewed  Clinical lab tests: ordered and reviewed  Tests in the radiology section of CPT®: ordered and reviewed      Labs Reviewed   COMPREHENSIVE METABOLIC PANEL - Abnormal; Notable for the following:        Result Value    Calcium 10.4 (*)     All other components within normal limits   ACETAMINOPHEN LEVEL - Abnormal; Notable for the following:     Acetaminophen <10.0 (*)     All other components within normal limits   SALICYLATE LEVEL - Abnormal; Notable for the following:     Salicylate <1.0 (*)     All other components within normal limits   URINE DRUG SCREEN - Normal    Narrative:     Negative Thresholds For Drugs Screened in Urine:     Amphetamines          500 ng/ml  Barbiturates          200 ng/ml  Benzodiazepines       200 ng/ml  Cocaine               150 ng/ml  Methadone             300 ng/mL  Opiates               300 ng/mL  Oxycodone             100 ng/mL  THC                   20 ng/mL    The normal value for all drugs tested is negative. This report includes final unconfirmed screening results.  A positive result by this assay can be, at your request, sent to the Reference Lab for confirmation by gas chromatography. Unconfirmed results must not be used for non-medical purposes, such as employment or legal testing. Clinical consideration should be applied to any drug of abuse test result, particularly when unconfirmed results are used.   PROTIME-INR - Normal    Narrative:     Therapeutic range for most indications is 2.0-3.0 INR,  or 2.5-3.5 for mechanical heart valves.   TSH - Normal   T4, FREE - Normal   CBC WITH AUTO DIFFERENTIAL - Normal   APTT - Normal    Narrative:     The recommended Heparin therapeutic range is 68-97  seconds.   TROPONIN (IN-HOUSE) - Normal   POCT GLUCOSE FINGERSTICK - Normal   RAINBOW DRAW    Narrative:     The following orders were created for panel order [place] Draw.  Procedure                               Abnormality         Status                     ---------                               -----------         ------                     Light Blue Top[[id number]]                                   Final result               Green Top (Gel)[[id number]]                                  Final result               Lavender Top[[id number]]                                     Final result               Gold Top - SST[[id number]]                                   Final result                 Please view results for these tests on the individual orders.   ETHANOL   POCT GLUCOSE FINGERSTICK   CBC AND DIFFERENTIAL    Narrative:     The following orders were created for panel order CBC & Differential.  Procedure                               Abnormality         Status                     ---------                               -----------         ------                     CBC Auto Differential[[id number]]        Normal              Final result                 Please view results for these tests on the individual orders.   LIGHT BLUE TOP   GREEN TOP   LAVENDER TOP   GOLD TOP - SST       Xr Chest 1 View    Result Date: 4/11/2018  Narrative: PORTABLE CHEST HISTORY: Overdose Portable AP upright film of the chest was obtained at 2:49 PM. COMPARISON: January 26, 2016 EKG leads. The lungs are clear of an acute process. Old granulomatous disease is present. The heart is not enlarged. The pulmonary vasculature is not increased. No pleural effusion. No pneumothorax. No acute osseous abnormality. Degenerative changes are present in the thoracic spine.     Impression: CONCLUSION: No Acute Disease 25324 Electronically signed by:  Pa Minaya MD  4/11/2018 3:11 PM CDT Workstation: [id number]          Final diagnoses:   Suicidal  ideation   Intentional drug overdose, initial encounter   Dermatitis            Yonas Juárez MD  04/11/18 2104

## 2018-04-12 PROBLEM — F15.10 AMPHETAMINE ABUSE (HCC): Status: RESOLVED | Noted: 2018-02-16 | Resolved: 2018-04-12

## 2018-04-12 PROBLEM — T50.901A OVERDOSE: Status: ACTIVE | Noted: 2018-04-12

## 2018-04-12 PROBLEM — R45.851 SUICIDE IDEATION: Status: RESOLVED | Noted: 2018-04-11 | Resolved: 2018-04-12

## 2018-04-12 PROCEDURE — 99222 1ST HOSP IP/OBS MODERATE 55: CPT | Performed by: FAMILY MEDICINE

## 2018-04-12 PROCEDURE — 90791 PSYCH DIAGNOSTIC EVALUATION: CPT | Performed by: PSYCHIATRY & NEUROLOGY

## 2018-04-12 RX ORDER — TRIAMCINOLONE ACETONIDE 1 MG/G
CREAM TOPICAL EVERY 8 HOURS SCHEDULED
Status: DISCONTINUED | OUTPATIENT
Start: 2018-04-12 | End: 2018-04-20 | Stop reason: HOSPADM

## 2018-04-12 RX ADMIN — ASPIRIN 81 MG 81 MG: 81 TABLET ORAL at 08:55

## 2018-04-12 RX ADMIN — PANTOPRAZOLE SODIUM 40 MG: 40 TABLET, DELAYED RELEASE ORAL at 08:55

## 2018-04-12 RX ADMIN — CLOPIDOGREL BISULFATE 75 MG: 75 TABLET ORAL at 08:55

## 2018-04-12 RX ADMIN — QUETIAPINE FUMARATE 600 MG: 300 TABLET ORAL at 21:45

## 2018-04-12 RX ADMIN — TRIAMCINOLONE ACETONIDE: 1 CREAM TOPICAL at 15:00

## 2018-04-12 RX ADMIN — ATENOLOL 50 MG: 50 TABLET ORAL at 08:54

## 2018-04-12 RX ADMIN — HYDROCHLOROTHIAZIDE 25 MG: 25 TABLET ORAL at 08:55

## 2018-04-12 RX ADMIN — METOPROLOL SUCCINATE 50 MG: 50 TABLET, EXTENDED RELEASE ORAL at 08:55

## 2018-04-12 RX ADMIN — DONEPEZIL HYDROCHLORIDE 10 MG: 10 TABLET ORAL at 21:45

## 2018-04-12 RX ADMIN — DULOXETINE HYDROCHLORIDE 90 MG: 60 CAPSULE, DELAYED RELEASE ORAL at 08:54

## 2018-04-12 RX ADMIN — TRIAMCINOLONE ACETONIDE: 1 CREAM TOPICAL at 09:55

## 2018-04-12 RX ADMIN — TERAZOSIN HYDROCHLORIDE ANHYDROUS 2 MG: 2 CAPSULE ORAL at 21:45

## 2018-04-12 RX ADMIN — ATORVASTATIN CALCIUM 20 MG: 20 TABLET, FILM COATED ORAL at 21:45

## 2018-04-12 NOTE — NURSING NOTE
Dr Collette PERKINS         General  headaches    Eyes   None     ENT/Mouth   None    Cardio   None    Resp   None    GI    None       None    MS    None    Skin/Hair/Nails   Rash    Neuro   None

## 2018-04-12 NOTE — SIGNIFICANT NOTE
"   04/12/18 1041   Individual Counseling   Length of Session 30   Topic Initial Assessment    Patient Response CSW met with pt 1:1 and completed psychosocial assessment and BPRS. Pt presented to the dayroom, pacing the halls, alert and oriented, dressed casually. Pt irritable and argumentative. Stated \"dont talk to me because I dont want to be here.\" CSW inquired about reason for admission, as pt was just dc'd from this unit approx 1 month ago. Pt stated he \"came to the ER because of the itching.\" Pt has had hx of allergic reactions that he attributes to certain types of laundry detergent. Pt stated that his sleep has been disturbed and 2 nights ago he took a whole bottle of \"sleep aides\" in efforts to sleep and stop the itching. Pt reports when he expressed this to the ER, they took him as being suicidal. Pt stated \"I dont want to kill myself, I dont even want to die of old age.\" Pt is adament that he is not wanting to be on this unit. Pt has long hx, however, of psychiatric illness and multiple past hospitalizations. Pt also has long hx of non compliance. Pt does have hx of early onset dementia and cognitive impairment that causes severe impairment in judgement. Pt lacks insight and makes poor decisions. Pt was dc'd from this unit last month and moved in to the [facility]. Pt left the [place] last week and has been sta lang in a motel. Pt says that he has stopped taking his meds \"because they make me crazier.\" Pt reports that he plans to dc home with his wife, whom he has in the past reported having a very poor relationship with and have been  from for sometime. However, pt says that she is now supportive and is going to be \"taking over my medications and helping me.\" Pt is fixated on leaving, is unable to understand his risk and need to comply with our recommendations. Pt will meet with MD to determine disposition. CSW to follow up accordingly.      "

## 2018-04-12 NOTE — NURSING NOTE
"Behavior   Anxiety: Feeling anxious and Feeling irritable  Depression: anxiety  Pain   0  AVH   no  S/I   no  H/I   no  Affect   very anxious    Patient in bed. Assessment paperwork complete.  Patient could not state which floor he was on in hospital, replied \"orthopedic or something\".  Also could not recall the day, month, or season.  Patient appears anxious, eye darting, fidgeting with covers. States \"All I came here for was my itching and rash and nobody is doing anything for it\".  Reassured patient that medications were being ordered by MD.  PRN Vistaril and Trazodone has already been given.  Patient reports that he has not found out what keeps causing the rash and cannot find anything that helps it. No other needs at this time.  Will continue to monitor.         Intervention  Medications reviewed and administered  Assessment complete    Response  Verbalized understanding   Took medications  Interacted with assessment    Plan  Will promote and reinforce current treatment plan and encourage involvement in care plan goals.   Will provide for safe, calm, quiet environment.  Will promote open communication with staff and foster a trusting/working relationship with patient.   Will promote participation in groups and therapies and independent reflection.      "

## 2018-04-12 NOTE — NURSING NOTE
"  Pt has been agitated, pacing halls and frequently seeking staff for medication for persistent itching. Pt came to nurse's desk and requested \"dry-skin lotion\". Lotion and Protectant Z Guard paste. Staff went to patient's door to give him requested lotion. Pt refused paste. Pt stated that he couldn't put the lotion on. Informed pt that staff did not have gloves on. Pt's voice appeared to become more irritated and pt began cussing at nurse stating \"I'm itching so God d**n bad. I'm so f**raul frustrated.\" Pt stood quickly from bed grabbed lotion and forcefully threw it against the wall. Security was called at this time.   "

## 2018-04-12 NOTE — PLAN OF CARE
Problem: Overarching Goals (Adult)  Goal: Optimized Coping Skills in Response to Life Stressors  Outcome: Ongoing (interventions implemented as appropriate)    Goal: Develops/Participates in Therapeutic [place] to Support Successful Transition  Outcome: Ongoing (interventions implemented as appropriate)      Problem: Suicidal Behavior (Adult)  Goal: Suicidal Behavior is Absent/Minimized/Managed  Outcome: Ongoing (interventions implemented as appropriate)      Problem: Mood Impairment (Anxiety Signs/Symptoms) (Adult)  Goal: Improved Mood Symptoms (Anxiety Signs/Symptoms)  Outcome: Ongoing (interventions implemented as appropriate)      Problem: Cognitive Impairment (Anxiety Signs/Symptoms) (Adult)  Goal: Improved Cognitive Function (Anxiety Signs/Symptoms)  Outcome: Ongoing (interventions implemented as appropriate)
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
## 2018-04-12 NOTE — PLAN OF CARE
Problem: Overarching Goals (Adult)  Goal: Adheres to Safety Considerations for Self and Others  Outcome: Ongoing (interventions implemented as appropriate)    Goal: Optimized Coping Skills in Response to Life Stressors  Outcome: Ongoing (interventions implemented as appropriate)    Goal: Develops/Participates in Therapeutic [place] to Support Successful Transition  Outcome: Ongoing (interventions implemented as appropriate)      Problem: Suicidal Behavior (Adult)  Goal: Suicidal Behavior is Absent/Minimized/Managed  Outcome: Ongoing (interventions implemented as appropriate)      Problem: Mood Impairment (Anxiety Signs/Symptoms) (Adult)  Goal: Improved Mood Symptoms (Anxiety Signs/Symptoms)  Outcome: Ongoing (interventions implemented as appropriate)      Problem: Cognitive Impairment (Anxiety Signs/Symptoms) (Adult)  Goal: Improved Cognitive Function (Anxiety Signs/Symptoms)  Outcome: Ongoing (interventions implemented as appropriate)

## 2018-04-12 NOTE — NURSING NOTE
"Behavior   Anxiety: Feeling anxious  Depression: Denies depression  Pain  0  AVH   no  S/I   no  H/I   no    Affect   calm and pleasant    Note:up in [place] pacing agitated. \"I dont belong here. I need to go.\" good eye contact. Flat affect. medication compliant. \"I was frustrated and I took 3 or 4 sleeping pills. I couldn't sleep.My memory comes and goes. I'm gonna go stay with Chelsi my wife for a while.[place] to [place].\" medication compliant.       Intervention  Instructed in medication usage and effects  Medications administered as ordered  Encouraged to verbalize needs. Notify staff of increased frustration. Increase participation in group.       Response  Verbalized understanding   Did patient take medications as ordered yes   Did patient interact with assessment?  yes     Plan  Will monitor for safety  Will monitor every 15 minutes as ordered  Will evaluate and promote the plan of care    "
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
## 2018-04-12 NOTE — NURSING NOTE
Patient provided with milk. Appears to be less anxious.  Polite and calm.  Reports that his itching is better now. Will continue to monitor.

## 2018-04-13 PROCEDURE — 99232 SBSQ HOSP IP/OBS MODERATE 35: CPT | Performed by: PSYCHIATRY & NEUROLOGY

## 2018-04-13 RX ORDER — ZIPRASIDONE MESYLATE 20 MG/ML
20 INJECTION, POWDER, LYOPHILIZED, FOR SOLUTION INTRAMUSCULAR 2 TIMES DAILY PRN
Status: DISCONTINUED | OUTPATIENT
Start: 2018-04-13 | End: 2018-04-20 | Stop reason: HOSPADM

## 2018-04-13 RX ADMIN — HYDROCHLOROTHIAZIDE 25 MG: 25 TABLET ORAL at 08:32

## 2018-04-13 RX ADMIN — QUETIAPINE FUMARATE 600 MG: 300 TABLET ORAL at 21:28

## 2018-04-13 RX ADMIN — ATORVASTATIN CALCIUM 20 MG: 20 TABLET, FILM COATED ORAL at 21:28

## 2018-04-13 RX ADMIN — ASPIRIN 81 MG 81 MG: 81 TABLET ORAL at 08:32

## 2018-04-13 RX ADMIN — DULOXETINE HYDROCHLORIDE 90 MG: 60 CAPSULE, DELAYED RELEASE ORAL at 08:32

## 2018-04-13 RX ADMIN — CLOPIDOGREL BISULFATE 75 MG: 75 TABLET ORAL at 08:32

## 2018-04-13 RX ADMIN — ATENOLOL 50 MG: 50 TABLET ORAL at 08:32

## 2018-04-13 RX ADMIN — METOPROLOL SUCCINATE 50 MG: 50 TABLET, EXTENDED RELEASE ORAL at 08:32

## 2018-04-13 RX ADMIN — DONEPEZIL HYDROCHLORIDE 10 MG: 10 TABLET ORAL at 21:28

## 2018-04-13 RX ADMIN — PANTOPRAZOLE SODIUM 40 MG: 40 TABLET, DELAYED RELEASE ORAL at 08:32

## 2018-04-13 RX ADMIN — TERAZOSIN HYDROCHLORIDE ANHYDROUS 2 MG: 2 CAPSULE ORAL at 21:28

## 2018-04-13 NOTE — PLAN OF CARE
Problem: Patient Care Overview  Goal: Plan of Care Review  Outcome: Ongoing (interventions implemented as appropriate)   04/13/18 0144   Coping/Psychosocial   Plan of Care Reviewed With patient   Plan of Care Review   Progress no change     Goal: Individualization and Mutuality  Outcome: Ongoing (interventions implemented as appropriate)    Goal: Discharge Needs Assessment  Outcome: Ongoing (interventions implemented as appropriate)      Problem: Overarching Goals (Adult)  Goal: Adheres to Safety Considerations for Self and Others  Outcome: Ongoing (interventions implemented as appropriate)    Goal: Optimized Coping Skills in Response to Life Stressors  Outcome: Ongoing (interventions implemented as appropriate)    Goal: Develops/Participates in Therapeutic [place] to Support Successful Transition  Outcome: Ongoing (interventions implemented as appropriate)
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
## 2018-04-13 NOTE — PLAN OF CARE
Problem: Patient Care Overview  Goal: Plan of Care Review  Outcome: Ongoing (interventions implemented as appropriate)      Problem: Overarching Goals (Adult)  Goal: Adheres to Safety Considerations for Self and Others  Outcome: Ongoing (interventions implemented as appropriate)    Goal: Optimized Coping Skills in Response to Life Stressors  Outcome: Ongoing (interventions implemented as appropriate)      Problem: Suicidal Behavior (Adult)  Goal: Suicidal Behavior is Absent/Minimized/Managed  Outcome: Ongoing (interventions implemented as appropriate)      Problem: Mood Impairment (Anxiety Signs/Symptoms) (Adult)  Goal: Improved Mood Symptoms (Anxiety Signs/Symptoms)  Outcome: Ongoing (interventions implemented as appropriate)      Problem: Cognitive Impairment (Anxiety Signs/Symptoms) (Adult)  Goal: Improved Cognitive Function (Anxiety Signs/Symptoms)  Outcome: Ongoing (interventions implemented as appropriate)

## 2018-04-14 PROCEDURE — 99232 SBSQ HOSP IP/OBS MODERATE 35: CPT | Performed by: PSYCHIATRY & NEUROLOGY

## 2018-04-14 RX ADMIN — PANTOPRAZOLE SODIUM 40 MG: 40 TABLET, DELAYED RELEASE ORAL at 09:23

## 2018-04-14 RX ADMIN — HYDROCHLOROTHIAZIDE 25 MG: 25 TABLET ORAL at 09:22

## 2018-04-14 RX ADMIN — ASPIRIN 81 MG 81 MG: 81 TABLET ORAL at 09:23

## 2018-04-14 RX ADMIN — TRIAMCINOLONE ACETONIDE: 1 CREAM TOPICAL at 14:00

## 2018-04-14 RX ADMIN — ATENOLOL 50 MG: 50 TABLET ORAL at 09:22

## 2018-04-14 RX ADMIN — TERAZOSIN HYDROCHLORIDE ANHYDROUS 2 MG: 2 CAPSULE ORAL at 21:16

## 2018-04-14 RX ADMIN — METOPROLOL SUCCINATE 50 MG: 50 TABLET, EXTENDED RELEASE ORAL at 09:23

## 2018-04-14 RX ADMIN — DONEPEZIL HYDROCHLORIDE 10 MG: 10 TABLET ORAL at 21:16

## 2018-04-14 RX ADMIN — CLOPIDOGREL BISULFATE 75 MG: 75 TABLET ORAL at 09:23

## 2018-04-14 RX ADMIN — QUETIAPINE FUMARATE 600 MG: 300 TABLET ORAL at 21:16

## 2018-04-14 RX ADMIN — TRAZODONE HYDROCHLORIDE 50 MG: 50 TABLET ORAL at 21:16

## 2018-04-14 RX ADMIN — ATORVASTATIN CALCIUM 20 MG: 20 TABLET, FILM COATED ORAL at 21:16

## 2018-04-14 RX ADMIN — DULOXETINE HYDROCHLORIDE 90 MG: 60 CAPSULE, DELAYED RELEASE ORAL at 09:22
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
## 2018-04-14 NOTE — PROGRESS NOTES
4/14/2018    Chief Complaint: homicidal ideation    Subjective:  Patient is a 53 y.o. male who was hospitalized for drug overdose.   He continues to be very angry at his wife but today minimizes his homicidal threats from yesterday.  He is not sure where to go b/c he was banking on his wife letting him come stay with her but he feels duped.  He notes she has control of much of his things.    Objective     Vital Signs    Temp:  [97 °F (36.1 °C)-98.5 °F (36.9 °C)] 97 °F (36.1 °C)  Heart Rate:  [84-88] 88  Resp:  [16-18] 16  BP: (100-109)/(59-69) 109/69    Physical Exam:   General Appearance: alert, appears stated age and cooperative,  Hygiene:   good  Gait & Station: Normal  Musculoskeletal: No tremors or abnormal involuntary movements    Mental Status Exam:   Cooperation:  Cooperative  Eye Contact:  Good  Psychomotor Behavior:  Appropriate  Mood: Irritable  Affect:  constricted  Speech:  Normal  Thought Process:  Coherent  Associations: Goal Directed  Thought Content:     Normal   Suicidal:  None   Homicidal:  minimizes HI   Hallucinations:  None   Delusion:  None  Cognitive Functioning:   Consciousness: awake, alert and oriented  Reliability:  fair  Insight:  Poor  Judgement:  Poor  Impulse Control:  Impaired    Lab Results (last 24 hours)     ** No results found for the last 24 hours. **        Imaging Results (last 24 hours)     ** No results found for the last 24 hours. **          Medicine:   Current Facility-Administered Medications:   •  acetaminophen (TYLENOL) tablet 650 mg, 650 mg, Oral, Q4H PRN, Gerry Trinidad MD  •  aluminum-magnesium hydroxide-simethicone (MAALOX MAX) 400-400-40 MG/5ML suspension 15 mL, 15 mL, Oral, Q6H PRN, Gerry Trinidad MD  •  aspirin chewable tablet 81 mg, 81 mg, Oral, Daily, Gerry Trinidad MD, 81 mg at 04/14/18 0923  •  atenolol (TENORMIN) tablet 50 mg, 50 mg, Oral, Daily, Gerry Trinidad MD, 50 mg at 04/14/18 0922  •  atorvastatin (LIPITOR) tablet 20 mg, 20 mg, Oral,  Nightly, Gerry Trinidad MD, 20 mg at 04/13/18 2128  •  CloNIDine (CATAPRES) tablet 0.1 mg, 0.1 mg, Oral, Q4H PRN, Gerry Trinidad MD  •  clopidogrel (PLAVIX) tablet 75 mg, 75 mg, Oral, Daily, Gerry Trinidad MD, 75 mg at 04/14/18 0923  •  diphenhydrAMINE (BENADRYL) capsule 50 mg, 50 mg, Oral, Q6H PRN, Gerry Trinidad MD, 50 mg at 04/11/18 2200  •  docusate sodium (COLACE) capsule 100 mg, 100 mg, Oral, BID PRN, Gerry Trinidad MD  •  donepezil (ARICEPT) tablet 10 mg, 10 mg, Oral, Nightly, Gerry Trinidad MD, 10 mg at 04/13/18 2128  •  DULoxetine (CYMBALTA) DR capsule 90 mg, 90 mg, Oral, Daily, Gerry Trinidad MD, 90 mg at 04/14/18 0922  •  hydrochlorothiazide (HYDRODIURIL) tablet 25 mg, 25 mg, Oral, Daily, Gerry Trinidad MD, 25 mg at 04/14/18 0922  •  loperamide (IMODIUM) capsule 2 mg, 2 mg, Oral, 4x Daily PRN, Gerry Trinidad MD  •  magnesium hydroxide (MILK OF MAGNESIA) suspension 2400 mg/10mL 10 mL, 10 mL, Oral, Daily PRN, Gerry Trinidad MD  •  metoprolol succinate XL (TOPROL-XL) 24 hr tablet 50 mg, 50 mg, Oral, Daily, Gerry Trinidad MD, 50 mg at 04/14/18 0923  •  ondansetron (ZOFRAN) tablet 4 mg, 4 mg, Oral, Q6H PRN, Gerry Trinidad MD  •  pantoprazole (PROTONIX) EC tablet 40 mg, 40 mg, Oral, Daily, Gerry Trinidad MD, 40 mg at 04/14/18 0923  •  QUEtiapine (SEROquel) tablet 600 mg, 600 mg, Oral, Nightly, Gerry Trinidad MD, 600 mg at 04/13/18 2128  •  terazosin (HYTRIN) capsule 2 mg, 2 mg, Oral, Nightly, Gerry Trinidad MD, 2 mg at 04/13/18 2128  •  traZODone (DESYREL) tablet 50 mg, 50 mg, Oral, Nightly PRN, Gerry Trinidad MD, 50 mg at 04/11/18 2016  •  triamcinolone (KENALOG) 0.1 % cream, , Topical, Q8H, Isidro Murdock MD  •  ziprasidone (GEODON) injection 20 mg, 20 mg, Intramuscular, BID PRN, Gerry Trinidad MD    Diagnoses/Assessment:   Principal Problem:    Severe episode of recurrent major depressive disorder, with psychotic features  Active  Problems:    Dementia in Alzheimer's disease with early onset    Overdose      Treatment Plan:    1) Will continue care for the patient on the behavioral health unit at [facility] to ensure patient safety.  2) Will continue to provide treatment with the unit milieu, activities, therapies and psychopharmacological management.  3) Patient to be placed on or continued on  Q15 minute checks  and Suicide and Aggression precautions.  4) Pertinent medical issues:  No active medical concerns.  5) Will order following labs: none  6) Will make the following medication changes: Will continue the cymbalta and seroquel.  Will order PRN geodon shot for agitation.  7) Will continue discharge planning as appropriate for patient.  8) Psychotherapy provided: none    Treatment plan and medication risks and benefits discussed with: Patient    Gerry Trinidad MD  04/14/18  12:12 PM

## 2018-04-14 NOTE — PLAN OF CARE
Problem: Patient Care Overview  Goal: Plan of Care Review  Outcome: Ongoing (interventions implemented as appropriate)   04/13/18 2321   Coping/Psychosocial   Plan of Care Reviewed With patient     Goal: Individualization and Mutuality  Outcome: Ongoing (interventions implemented as appropriate)   04/13/18 2321   Personal Strengths/Vulnerabilities   Patient Personal Strengths expressive of emotions;expressive of needs;independent living skills   Patient Vulnerabilities issues with memory, lack of support     Goal: Discharge Needs Assessment  Outcome: Ongoing (interventions implemented as appropriate)   04/13/18 2321   Discharge Needs Assessment   Concerns to be Addressed compliance issue;decision making;home safety       Problem: Overarching Goals (Adult)  Goal: Adheres to Safety Considerations for Self and Others  Outcome: Ongoing (interventions implemented as appropriate)    Intervention: Develop and Maintain Individualized Safety Plan   04/13/18 2321   Develop and Maintain Individualized Safety Plan   Safety Measures safety rounds completed   Violence Risk   Feels Like Hurting Others no   Previous Attempt to Harm Others no   C-SSRS (Recent)   Wish to be Dead no   Suicidal Thoughts no   Suicidal Thought with Method No Plan/Intent no   Suicidal Intent (without Specific Plan) no   Suicide Intent with Specific Plan no   Describe Plan (Suicide Intent) no   Suicide Behavior no       Goal: Optimized Coping Skills in Response to Life Stressors  Outcome: Ongoing (interventions implemented as appropriate)    Intervention: Promote Effective Coping Strategies   04/13/18 2321   Coping/Psychosocial Interventions   Supportive Measures problem solving facilitated;decision-making supported;positive reinforcement provided       Goal: Develops/Participates in Therapeutic [place] to Support Successful Transition  Outcome: Ongoing (interventions implemented as appropriate)    Intervention: Foster Therapeutic [place]   04/13/18 2321    Interventions   Trust Relationship/Rapport care explained;thoughts/feelings acknowledged;questions answered;questions encouraged     Intervention: Mutually Develop Transition Plan   04/13/18 1690   Mutually Develop Transition Plan   Transition Support follow-up care discussed

## 2018-04-14 NOTE — NURSING NOTE
"Behavior   Anxiety: Feeling anxious  Depression: depressed mood, hopelessness and anxiety  Pain  0  AVH   no  S/I   no  H/I   no    Affect   depressed and anxious    Note: Patient verbalizes anger towards his wife for lying to him and taking his money. He reveals he would like to strangle her but quickly states \" not really you know, I am just so mad at her\". Discussed with patient to call Monday and cancel current card, stated \" I will\", \" I don't have enough money to get home to [place]\"      Intervention  Instructed in medication usage and effects  Medications administered as ordered  Encouraged to verbalize needs      Response  Verbalized understanding   Did patient take medications as ordered yes   Did patient interact with assessment?  yes     Plan  Will monitor for safety  Will monitor every 15 minutes as ordered  Will evaluate and promote the plan of care  "

## 2018-04-14 NOTE — PLAN OF CARE
Problem: Patient Care Overview  Goal: Plan of Care Review  Outcome: Ongoing (interventions implemented as appropriate)   04/14/18 7969   Coping/Psychosocial   Plan of Care Reviewed With patient   Plan of Care Review   Progress no change   Coping/Psychosocial   Patient Agreement with Plan of Care agrees     Goal: Individualization and Mutuality  Outcome: Ongoing (interventions implemented as appropriate)    Goal: Discharge Needs Assessment  Outcome: Ongoing (interventions implemented as appropriate)      Problem: Overarching Goals (Adult)  Goal: Adheres to Safety Considerations for Self and Others  Outcome: Ongoing (interventions implemented as appropriate)    Goal: Optimized Coping Skills in Response to Life Stressors  Outcome: Ongoing (interventions implemented as appropriate)    Goal: Develops/Participates in Therapeutic [place] to Support Successful Transition  Outcome: Ongoing (interventions implemented as appropriate)

## 2018-04-15 PROCEDURE — 99232 SBSQ HOSP IP/OBS MODERATE 35: CPT | Performed by: PSYCHIATRY & NEUROLOGY

## 2018-04-15 RX ADMIN — TERAZOSIN HYDROCHLORIDE ANHYDROUS 2 MG: 2 CAPSULE ORAL at 21:46

## 2018-04-15 RX ADMIN — ASPIRIN 81 MG 81 MG: 81 TABLET ORAL at 08:11

## 2018-04-15 RX ADMIN — DONEPEZIL HYDROCHLORIDE 10 MG: 10 TABLET ORAL at 21:46

## 2018-04-15 RX ADMIN — DULOXETINE HYDROCHLORIDE 90 MG: 60 CAPSULE, DELAYED RELEASE ORAL at 08:11

## 2018-04-15 RX ADMIN — ATORVASTATIN CALCIUM 20 MG: 20 TABLET, FILM COATED ORAL at 21:46

## 2018-04-15 RX ADMIN — METOPROLOL SUCCINATE 50 MG: 50 TABLET, EXTENDED RELEASE ORAL at 08:11

## 2018-04-15 RX ADMIN — TRAZODONE HYDROCHLORIDE 50 MG: 50 TABLET ORAL at 21:46

## 2018-04-15 RX ADMIN — HYDROCHLOROTHIAZIDE 25 MG: 25 TABLET ORAL at 08:12

## 2018-04-15 RX ADMIN — CLOPIDOGREL BISULFATE 75 MG: 75 TABLET ORAL at 08:11

## 2018-04-15 RX ADMIN — ATENOLOL 50 MG: 50 TABLET ORAL at 08:16

## 2018-04-15 RX ADMIN — QUETIAPINE FUMARATE 600 MG: 300 TABLET ORAL at 21:46

## 2018-04-15 RX ADMIN — PANTOPRAZOLE SODIUM 40 MG: 40 TABLET, DELAYED RELEASE ORAL at 08:16

## 2018-04-15 NOTE — PLAN OF CARE
Problem: Suicidal Behavior (Adult)  Goal: Suicidal Behavior is Absent/Minimized/Managed  Outcome: Ongoing (interventions implemented as appropriate)      Problem: Mood Impairment (Anxiety Signs/Symptoms) (Adult)  Goal: Improved Mood Symptoms (Anxiety Signs/Symptoms)  Outcome: Ongoing (interventions implemented as appropriate)      Problem: Cognitive Impairment (Anxiety Signs/Symptoms) (Adult)  Goal: Improved Cognitive Function (Anxiety Signs/Symptoms)  Outcome: Ongoing (interventions implemented as appropriate)

## 2018-04-15 NOTE — NURSING NOTE
"Behavior   Anxiety: \"little\"  Depression: denies  Pain  0  AVH   no  S/I   no  H/I   no    Affect   calm and pleasant    Note: Patient stated \" I am still thinking about my wife saying her car got stolen, I think it could be a lie\", \" I need to go home\". Patient has blunted affect & is calm & friendly. Patient repeats \" I just don't know what I am supposed to do\"      Intervention  Instructed in medication usage and effects  Medications administered as ordered  Encouraged to verbalize needs      Response  Verbalized understanding   Did patient take medications as ordered yes   Did patient interact with assessment?  yes     Plan  Will monitor for safety  Will monitor every 15 minutes as ordered  Will evaluate and promote the plan of care  "

## 2018-04-15 NOTE — PLAN OF CARE
Problem: Patient Care Overview  Goal: Plan of Care Review  Outcome: Ongoing (interventions implemented as appropriate)    Goal: Individualization and Mutuality  Outcome: Ongoing (interventions implemented as appropriate)    Goal: Discharge Needs Assessment  Outcome: Ongoing (interventions implemented as appropriate)      Problem: Overarching Goals (Adult)  Goal: Adheres to Safety Considerations for Self and Others  Outcome: Ongoing (interventions implemented as appropriate)    Goal: Optimized Coping Skills in Response to Life Stressors  Outcome: Ongoing (interventions implemented as appropriate)    Goal: Develops/Participates in Therapeutic [place] to Support Successful Transition  Outcome: Ongoing (interventions implemented as appropriate)

## 2018-04-15 NOTE — PLAN OF CARE
Problem: Patient Care Overview  Goal: Plan of Care Review  Outcome: Ongoing (interventions implemented as appropriate)    Goal: Individualization and Mutuality  Outcome: Ongoing (interventions implemented as appropriate)    Goal: Discharge Needs Assessment  Outcome: Ongoing (interventions implemented as appropriate)      Problem: Overarching Goals (Adult)  Goal: Adheres to Safety Considerations for Self and Others  Outcome: Ongoing (interventions implemented as appropriate)    Goal: Optimized Coping Skills in Response to Life Stressors  Outcome: Ongoing (interventions implemented as appropriate)    Goal: Develops/Participates in Therapeutic [place] to Support Successful Transition  Outcome: Ongoing (interventions implemented as appropriate)

## 2018-04-15 NOTE — NURSING NOTE
Behavior   Anxiety: Patient denies at this time  Depression: Patient denies at this time  Pain   0  AVH   no  S/I   no  H/I   no  Affect   calm and pleasant at this time    Patients' affect and attitude are quite changed from last evenings' assessment.  He is calm and pleasant during conversation and states he is feeling much better.  Apparently he spoke with his wife via telephone today and they were able, according to patient, to clear up his misunderstanding regarding her absence/lack of support during his current admission.  He seems brighter and hopeful that their initial plan of his discharging to their home will occur.  He is medication compliant and voices no new concerns.      Intervention  Medications reviewed and administered  Assessment complete    Response  Verbalized understanding   Took medications  Interacted with assessment    Plan  Will promote and reinforce current treatment plan and encourage involvement in care plan goals.   Will provide for safe, calm, quiet environment.  Will promote open communication with staff and foster a trusting/working relationship with patient.   Will promote participation in groups and therapies and independent reflection.

## 2018-04-15 NOTE — PROGRESS NOTES
4/15/2018    Chief Complaint: homicidal ideation    Subjective:  Patient is a 53 y.o. male who was hospitalized for drug overdose.   Patient today is much calmer.  He notes that he got a hold of his wife who told him that her car was stolen and she is planning to come up to the unit tomorrow.  He notes he plans to go home with her and states that she is fine with that.  He notes he was just angry earlier when he made the other statements.    Objective     Vital Signs    Temp:  [96.7 °F (35.9 °C)-97.4 °F (36.3 °C)] 96.7 °F (35.9 °C)  Heart Rate:  [82-88] 82  Resp:  [18] 18  BP: (102-107)/(57-61) 107/61    Physical Exam:   General Appearance: alert, appears stated age and cooperative,  Hygiene:   good  Gait & Station: Normal  Musculoskeletal: No tremors or abnormal involuntary movements     Mental Status Exam:   Cooperation:  Cooperative  Eye Contact:  Good  Psychomotor Behavior:  Appropriate  Mood: normal, calm  Affect:  constricted  Speech:  Normal  Thought Process:  Coherent  Associations: Goal Directed  Thought Content:                Normal              Suicidal:  None              Homicidal:  None              Hallucinations:  None              Delusion:  None  Cognitive Functioning:              Consciousness: awake, alert and oriented  Reliability:  fair  Insight:  Poor  Judgement:  Poor  Impulse Control:  Impaired    Lab Results (last 24 hours)     ** No results found for the last 24 hours. **        Imaging Results (last 24 hours)     ** No results found for the last 24 hours. **          Medicine:   Current Facility-Administered Medications:   •  acetaminophen (TYLENOL) tablet 650 mg, 650 mg, Oral, Q4H PRN, Gerry Trinidad MD  •  aluminum-magnesium hydroxide-simethicone (MAALOX MAX) 400-400-40 MG/5ML suspension 15 mL, 15 mL, Oral, Q6H PRN, Gerry Trinidad MD  •  aspirin chewable tablet 81 mg, 81 mg, Oral, Daily, Gerry Trinidad MD, 81 mg at 04/15/18 0811  •  atenolol (TENORMIN) tablet 50 mg, 50 mg,  Oral, Daily, Gerry Trinidad MD, 50 mg at 04/15/18 0816  •  atorvastatin (LIPITOR) tablet 20 mg, 20 mg, Oral, Nightly, Gerry Trinidad MD, 20 mg at 04/14/18 2116  •  CloNIDine (CATAPRES) tablet 0.1 mg, 0.1 mg, Oral, Q4H PRN, Gerry Trinidad MD  •  clopidogrel (PLAVIX) tablet 75 mg, 75 mg, Oral, Daily, Gerry Trinidad MD, 75 mg at 04/15/18 0811  •  diphenhydrAMINE (BENADRYL) capsule 50 mg, 50 mg, Oral, Q6H PRN, Gerry Trinidad MD, 50 mg at 04/11/18 2200  •  docusate sodium (COLACE) capsule 100 mg, 100 mg, Oral, BID PRN, Gerry Trinidad MD  •  donepezil (ARICEPT) tablet 10 mg, 10 mg, Oral, Nightly, Gerry Trinidad MD, 10 mg at 04/14/18 2116  •  DULoxetine (CYMBALTA) DR capsule 90 mg, 90 mg, Oral, Daily, Gerry Trinidad MD, 90 mg at 04/15/18 0811  •  hydrochlorothiazide (HYDRODIURIL) tablet 25 mg, 25 mg, Oral, Daily, Gerry Trinidad MD, 25 mg at 04/15/18 0812  •  loperamide (IMODIUM) capsule 2 mg, 2 mg, Oral, 4x Daily PRN, Gerry Trinidad MD  •  magnesium hydroxide (MILK OF MAGNESIA) suspension 2400 mg/10mL 10 mL, 10 mL, Oral, Daily PRN, Gerry Trinidad MD  •  metoprolol succinate XL (TOPROL-XL) 24 hr tablet 50 mg, 50 mg, Oral, Daily, Gerry Trinidad MD, 50 mg at 04/15/18 0811  •  ondansetron (ZOFRAN) tablet 4 mg, 4 mg, Oral, Q6H PRN, Gerry Trinidad MD  •  pantoprazole (PROTONIX) EC tablet 40 mg, 40 mg, Oral, Daily, Gerry Trinidad MD, 40 mg at 04/15/18 0816  •  QUEtiapine (SEROquel) tablet 600 mg, 600 mg, Oral, Nightly, Gerry Trinidad MD, 600 mg at 04/14/18 2116  •  terazosin (HYTRIN) capsule 2 mg, 2 mg, Oral, Nightly, Gerry Trinidad MD, 2 mg at 04/14/18 2116  •  traZODone (DESYREL) tablet 50 mg, 50 mg, Oral, Nightly PRN, Gerry Trinidad MD, 50 mg at 04/14/18 2116  •  triamcinolone (KENALOG) 0.1 % cream, , Topical, Q8H, Isidro Murdock MD  •  ziprasidone (GEODON) injection 20 mg, 20 mg, Intramuscular, BID PRN, Gerry Trinidad MD    Diagnoses/Assessment:    Principal Problem:    Severe episode of recurrent major depressive disorder, with psychotic features  Active Problems:    Dementia in Alzheimer's disease with early onset    Overdose      Treatment Plan:    1) Will continue care for the patient on the behavioral health unit at [facility] to ensure patient safety.  2) Will continue to provide treatment with the unit milieu, activities, therapies and psychopharmacological management.  3) Patient to be placed on or continued on  Q15 minute checks  and Suicide and Aggression precautions.  4) Pertinent medical issues: No active medical concerns.  5) Will order following labs: none  6) Will make the following medication changes: Will continue the cymbalta and seroquel and prn geodon for agitation.  7) Will continue discharge planning as appropriate for patient.  Plan to do family session with wife before discharge.  8) Psychotherapy provided: none    Treatment plan and medication risks and benefits discussed with: Patient    Gerry Trinidad MD  04/15/18  3:00 PM

## 2018-04-16 PROCEDURE — 99232 SBSQ HOSP IP/OBS MODERATE 35: CPT | Performed by: PSYCHIATRY & NEUROLOGY

## 2018-04-16 RX ADMIN — HYDROCHLOROTHIAZIDE 25 MG: 25 TABLET ORAL at 09:34

## 2018-04-16 RX ADMIN — DONEPEZIL HYDROCHLORIDE 10 MG: 10 TABLET ORAL at 20:23

## 2018-04-16 RX ADMIN — ATORVASTATIN CALCIUM 20 MG: 20 TABLET, FILM COATED ORAL at 20:23

## 2018-04-16 RX ADMIN — ASPIRIN 81 MG 81 MG: 81 TABLET ORAL at 09:34

## 2018-04-16 RX ADMIN — METOPROLOL SUCCINATE 50 MG: 50 TABLET, EXTENDED RELEASE ORAL at 09:34

## 2018-04-16 RX ADMIN — PANTOPRAZOLE SODIUM 40 MG: 40 TABLET, DELAYED RELEASE ORAL at 09:34

## 2018-04-16 RX ADMIN — ATENOLOL 50 MG: 50 TABLET ORAL at 09:34

## 2018-04-16 RX ADMIN — TRAZODONE HYDROCHLORIDE 50 MG: 50 TABLET ORAL at 20:23

## 2018-04-16 RX ADMIN — TERAZOSIN HYDROCHLORIDE ANHYDROUS 2 MG: 2 CAPSULE ORAL at 20:23

## 2018-04-16 RX ADMIN — CLOPIDOGREL BISULFATE 75 MG: 75 TABLET ORAL at 09:34

## 2018-04-16 RX ADMIN — QUETIAPINE FUMARATE 600 MG: 300 TABLET ORAL at 20:23

## 2018-04-16 RX ADMIN — DULOXETINE HYDROCHLORIDE 90 MG: 60 CAPSULE, DELAYED RELEASE ORAL at 09:34

## 2018-04-16 NOTE — NURSING NOTE
Behavior   Anxiety: Feeling worried  Depression: anxiety  Pain   0  AVH   no  S/I   no  H/I   no  Affect   A bit sad    Patient seems a bit sad this evening.  It seems that he was not able to speak with his wife on the telephone related to her not answering after mx attempts to contact her. He feel that his plans for discharge are unraveling and he isn't sure what will happen if his wife doesn't answer his call(s).    Patient encouraged to express concerns and voice questions.  Assured of staff support and availability.      Intervention  Medications reviewed and administered  Assessment complete    Response  Verbalized understanding   Took medications  Interacted with assessment    Plan  Will promote and reinforce current treatment plan and encourage involvement in care plan goals.   Will provide for safe, calm, quiet environment.  Will promote open communication with staff and foster a trusting/working relationship with patient.   Will promote participation in groups and therapies and independent reflection.

## 2018-04-16 NOTE — PROGRESS NOTES
4/16/2018    Chief Complaint: Overdose    Subjective:  Patient is a 53 y.o. male who was hospitalized for overdose.   Today patient is calmer and denies any homicidal ideations towards his wife despite her not returning his calls or coming in for family session.    Objective     Vital Signs    Temp:  [97 °F (36.1 °C)-97.7 °F (36.5 °C)] 97 °F (36.1 °C)  Heart Rate:  [] 104  Resp:  [18] 18  BP: (106-118)/(72-79) 106/79    Physical Exam:   General Appearance: alert, appears stated age and cooperative,  Hygiene:   good  Gait & Station: Normal  Musculoskeletal: No tremors or abnormal involuntary movements     Mental Status Exam:   Cooperation:  Cooperative  Eye Contact:  Good  Psychomotor Behavior:  Appropriate  Mood: normal  Affect:  constricted  Speech:  Normal  Thought Process:  Coherent  Associations: Goal Directed  Thought Content:                Normal              Suicidal:  None              Homicidal:  None              Hallucinations:  None              Delusion:  None  Cognitive Functioning:              Consciousness: awake, alert and oriented  Reliability:  fair  Insight:  Poor  Judgement:  Poor  Impulse Control:  fair    Lab Results (last 24 hours)     ** No results found for the last 24 hours. **        Imaging Results (last 24 hours)     ** No results found for the last 24 hours. **          Medicine:   Current Facility-Administered Medications:   •  acetaminophen (TYLENOL) tablet 650 mg, 650 mg, Oral, Q4H PRN, Gerry Trinidad MD  •  aluminum-magnesium hydroxide-simethicone (MAALOX MAX) 400-400-40 MG/5ML suspension 15 mL, 15 mL, Oral, Q6H PRN, Gerry Trinidad MD  •  aspirin chewable tablet 81 mg, 81 mg, Oral, Daily, Gerry Trinidad MD, 81 mg at 04/16/18 0934  •  atenolol (TENORMIN) tablet 50 mg, 50 mg, Oral, Daily, Gerry Trinidad MD, 50 mg at 04/16/18 0934  •  atorvastatin (LIPITOR) tablet 20 mg, 20 mg, Oral, Nightly, Gerry Trinidad MD, 20 mg at 04/15/18 2106  •  CloNIDine  (CATAPRES) tablet 0.1 mg, 0.1 mg, Oral, Q4H PRN, Gerry Trinidad MD  •  clopidogrel (PLAVIX) tablet 75 mg, 75 mg, Oral, Daily, Gerry Trinidad MD, 75 mg at 04/16/18 0934  •  diphenhydrAMINE (BENADRYL) capsule 50 mg, 50 mg, Oral, Q6H PRN, Gerry Trinidad MD, 50 mg at 04/11/18 2200  •  docusate sodium (COLACE) capsule 100 mg, 100 mg, Oral, BID PRN, Gerry Trinidad MD  •  donepezil (ARICEPT) tablet 10 mg, 10 mg, Oral, Nightly, Gerry Trinidad MD, 10 mg at 04/15/18 2146  •  DULoxetine (CYMBALTA) DR capsule 90 mg, 90 mg, Oral, Daily, Gerry Trinidad MD, 90 mg at 04/16/18 0934  •  hydrochlorothiazide (HYDRODIURIL) tablet 25 mg, 25 mg, Oral, Daily, Gerry Trinidad MD, 25 mg at 04/16/18 0934  •  loperamide (IMODIUM) capsule 2 mg, 2 mg, Oral, 4x Daily PRN, Gerry Trinidad MD  •  magnesium hydroxide (MILK OF MAGNESIA) suspension 2400 mg/10mL 10 mL, 10 mL, Oral, Daily PRN, Gerry Trinidad MD  •  metoprolol succinate XL (TOPROL-XL) 24 hr tablet 50 mg, 50 mg, Oral, Daily, Gerry Trinidad MD, 50 mg at 04/16/18 0934  •  ondansetron (ZOFRAN) tablet 4 mg, 4 mg, Oral, Q6H PRN, Gerry Trinidad MD  •  pantoprazole (PROTONIX) EC tablet 40 mg, 40 mg, Oral, Daily, Gerry Trinidad MD, 40 mg at 04/16/18 0934  •  QUEtiapine (SEROquel) tablet 600 mg, 600 mg, Oral, Nightly, Gerry Trinidad MD, 600 mg at 04/15/18 2146  •  terazosin (HYTRIN) capsule 2 mg, 2 mg, Oral, Nightly, Gerry Trinidad MD, 2 mg at 04/15/18 2146  •  traZODone (DESYREL) tablet 50 mg, 50 mg, Oral, Nightly PRN, Gerry Trinidad MD, 50 mg at 04/15/18 2146  •  triamcinolone (KENALOG) 0.1 % cream, , Topical, Q8H, Isidro Murdock MD  •  ziprasidone (GEODON) injection 20 mg, 20 mg, Intramuscular, BID PRN, Gerry Trinidad MD    Diagnoses/Assessment:   Principal Problem:    Severe episode of recurrent major depressive disorder, with psychotic features  Active Problems:    Dementia in Alzheimer's disease with early onset     Overdose      Treatment Plan:    1) Will continue care for the patient on the behavioral health unit at [facility] to ensure patient safety.  2) Will continue to provide treatment with the unit milieu, activities, therapies and psychopharmacological management.  3) Patient to be placed on or continued on  Q15 minute checks  and Suicide and Aggression precautions.  4) Pertinent medical issues: No active medical concerns.  5) Will order following labs: none  6) Will make the following medication changes: Will continue the cymbalta and seroquel and prn geodon for agitation.  7) Will continue discharge planning as appropriate for patient.  8) Psychotherapy provided: none     Treatment plan and medication risks and benefits discussed with: Patient    Gerry Trinidad MD  04/16/18  4:30 PM

## 2018-04-16 NOTE — PLAN OF CARE
Problem: Patient Care Overview  Goal: Plan of Care Review  Outcome: Ongoing (interventions implemented as appropriate)    Goal: Individualization and Mutuality  Outcome: Ongoing (interventions implemented as appropriate)    Goal: Discharge Needs Assessment  Outcome: Ongoing (interventions implemented as appropriate)      Problem: Overarching Goals (Adult)  Goal: Adheres to Safety Considerations for Self and Others  Outcome: Ongoing (interventions implemented as appropriate)    Goal: Optimized Coping Skills in Response to Life Stressors  Outcome: Ongoing (interventions implemented as appropriate)    Goal: Develops/Participates in Therapeutic [place] to Support Successful Transition  Outcome: Ongoing (interventions implemented as appropriate)

## 2018-04-16 NOTE — NURSING NOTE
Pt refused to participate in groups all day.  Has been isolating - refusing to interact with staff/peers.  Has been med compliant.  Was very agitated earlier in the shift because he couldn't reach his wife.  Pt has had minimal interactions today with intermittent eye contact noted.  Affect is blunted.    Encouraged pt to interact more with staff/peers and to stop isolating.  Will continue to monitor.
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
## 2018-04-16 NOTE — PLAN OF CARE
Problem: Overarching Goals (Adult)  Goal: Adheres to Safety Considerations for Self and Others  Outcome: Ongoing (interventions implemented as appropriate)    Goal: Optimized Coping Skills in Response to Life Stressors  Outcome: Ongoing (interventions implemented as appropriate)    Goal: Develops/Participates in Therapeutic [place] to Support Successful Transition  Outcome: Ongoing (interventions implemented as appropriate)      Problem: Suicidal Behavior (Adult)  Goal: Suicidal Behavior is Absent/Minimized/Managed  Outcome: Ongoing (interventions implemented as appropriate)      Problem: Mood Impairment (Anxiety Signs/Symptoms) (Adult)  Goal: Improved Mood Symptoms (Anxiety Signs/Symptoms)  Outcome: Ongoing (interventions implemented as appropriate)      Problem: Cognitive Impairment (Anxiety Signs/Symptoms) (Adult)  Goal: Improved Cognitive Function (Anxiety Signs/Symptoms)  Outcome: Ongoing (interventions implemented as appropriate)

## 2018-04-17 PROCEDURE — 99232 SBSQ HOSP IP/OBS MODERATE 35: CPT | Performed by: PSYCHIATRY & NEUROLOGY

## 2018-04-17 PROCEDURE — 63710000001 DIPHENHYDRAMINE PER 50 MG: Performed by: PSYCHIATRY & NEUROLOGY

## 2018-04-17 RX ADMIN — QUETIAPINE FUMARATE 600 MG: 300 TABLET ORAL at 20:40

## 2018-04-17 RX ADMIN — TRAZODONE HYDROCHLORIDE 50 MG: 50 TABLET ORAL at 20:40

## 2018-04-17 RX ADMIN — PANTOPRAZOLE SODIUM 40 MG: 40 TABLET, DELAYED RELEASE ORAL at 09:35

## 2018-04-17 RX ADMIN — ATORVASTATIN CALCIUM 20 MG: 20 TABLET, FILM COATED ORAL at 20:40

## 2018-04-17 RX ADMIN — HYDROCHLOROTHIAZIDE 25 MG: 25 TABLET ORAL at 09:35

## 2018-04-17 RX ADMIN — ATENOLOL 50 MG: 50 TABLET ORAL at 09:35

## 2018-04-17 RX ADMIN — METOPROLOL SUCCINATE 50 MG: 50 TABLET, EXTENDED RELEASE ORAL at 09:35

## 2018-04-17 RX ADMIN — TERAZOSIN HYDROCHLORIDE ANHYDROUS 2 MG: 2 CAPSULE ORAL at 20:40

## 2018-04-17 RX ADMIN — DONEPEZIL HYDROCHLORIDE 10 MG: 10 TABLET ORAL at 20:40

## 2018-04-17 RX ADMIN — ASPIRIN 81 MG 81 MG: 81 TABLET ORAL at 09:35

## 2018-04-17 RX ADMIN — CLOPIDOGREL BISULFATE 75 MG: 75 TABLET ORAL at 09:35

## 2018-04-17 RX ADMIN — DULOXETINE HYDROCHLORIDE 90 MG: 60 CAPSULE, DELAYED RELEASE ORAL at 09:35

## 2018-04-17 RX ADMIN — DIPHENHYDRAMINE HYDROCHLORIDE 50 MG: 50 CAPSULE ORAL at 09:35

## 2018-04-17 NOTE — PLAN OF CARE
Problem: Patient Care Overview  Goal: Plan of Care Review  Outcome: Ongoing (interventions implemented as appropriate)   04/17/18 0542   Coping/Psychosocial   Plan of Care Reviewed With patient   Plan of Care Review   Progress improving   Coping/Psychosocial   Patient Agreement with Plan of Care agrees     Goal: Individualization and Mutuality  Outcome: Ongoing (interventions implemented as appropriate)    Goal: Discharge Needs Assessment  Outcome: Ongoing (interventions implemented as appropriate)      Problem: Overarching Goals (Adult)  Goal: Adheres to Safety Considerations for Self and Others  Outcome: Ongoing (interventions implemented as appropriate)    Goal: Optimized Coping Skills in Response to Life Stressors  Outcome: Ongoing (interventions implemented as appropriate)    Goal: Develops/Participates in Therapeutic [place] to Support Successful Transition  Outcome: Ongoing (interventions implemented as appropriate)

## 2018-04-17 NOTE — PROGRESS NOTES
4/17/2018    Chief Complaint: overdose    Subjective:  Patient is a 53 y.o. male who was hospitalized for overdose.   Patient today concerned about his situation but is not irritable or angry.  He notes has not been able to get a hold of his wife.  He is hoping he will get into a [facility].  He notes no others he could go stay with.    Objective     Vital Signs    Temp:  [98.2 °F (36.8 °C)-98.9 °F (37.2 °C)] 98.2 °F (36.8 °C)  Heart Rate:  [69-81] 69  Resp:  [18] 18  BP: ()/(58-67) 97/67    Physical Exam:   General Appearance: alert, appears stated age and cooperative,  Hygiene:   good  Gait & Station: Normal  Musculoskeletal: No tremors or abnormal involuntary movements    Mental Status Exam:   Cooperation:  Cooperative  Eye Contact:  Good  Psychomotor Behavior:  Appropriate  Mood: Worried  Affect:  constricted  Speech:  Normal  Thought Process:  Coherent  Associations: Goal Directed  Thought Content:     Normal   Suicidal:  None   Homicidal:  None   Hallucinations:  None   Delusion:  None  Cognitive Functioning:   Consciousness: awake, alert and oriented  Reliability:  fair  Insight:  Poor  Judgement:  Poor  Impulse Control:  Fair    Lab Results (last 24 hours)     ** No results found for the last 24 hours. **        Imaging Results (last 24 hours)     ** No results found for the last 24 hours. **          Medicine:   Current Facility-Administered Medications:   •  acetaminophen (TYLENOL) tablet 650 mg, 650 mg, Oral, Q4H PRN, Gerry Trinidad MD  •  aluminum-magnesium hydroxide-simethicone (MAALOX MAX) 400-400-40 MG/5ML suspension 15 mL, 15 mL, Oral, Q6H PRN, Gerry Trinidad MD  •  aspirin chewable tablet 81 mg, 81 mg, Oral, Daily, Gerry Trinidad MD, 81 mg at 04/17/18 0935  •  atenolol (TENORMIN) tablet 50 mg, 50 mg, Oral, Daily, Gerry Trinidad MD, 50 mg at 04/17/18 0935  •  atorvastatin (LIPITOR) tablet 20 mg, 20 mg, Oral, Nightly, Gerry Trinidad MD, 20 mg at 04/16/18 2023  •  CloNIDine  (CATAPRES) tablet 0.1 mg, 0.1 mg, Oral, Q4H PRN, Gerry Trinidad MD  •  clopidogrel (PLAVIX) tablet 75 mg, 75 mg, Oral, Daily, Gerry Trinidad MD, 75 mg at 04/17/18 0935  •  diphenhydrAMINE (BENADRYL) capsule 50 mg, 50 mg, Oral, Q6H PRN, Gerry Trinidad MD, 50 mg at 04/17/18 0935  •  docusate sodium (COLACE) capsule 100 mg, 100 mg, Oral, BID PRN, Gerry Trinidad MD  •  donepezil (ARICEPT) tablet 10 mg, 10 mg, Oral, Nightly, Gerry Trinidad MD, 10 mg at 04/16/18 2023  •  DULoxetine (CYMBALTA) DR capsule 90 mg, 90 mg, Oral, Daily, Gerry Trinidad MD, 90 mg at 04/17/18 0935  •  hydrochlorothiazide (HYDRODIURIL) tablet 25 mg, 25 mg, Oral, Daily, Gerry Trinidad MD, 25 mg at 04/17/18 0935  •  loperamide (IMODIUM) capsule 2 mg, 2 mg, Oral, 4x Daily PRN, Gerry Trinidad MD  •  magnesium hydroxide (MILK OF MAGNESIA) suspension 2400 mg/10mL 10 mL, 10 mL, Oral, Daily PRN, Gerry Trinidad MD  •  metoprolol succinate XL (TOPROL-XL) 24 hr tablet 50 mg, 50 mg, Oral, Daily, Gerry Trinidad MD, 50 mg at 04/17/18 0935  •  ondansetron (ZOFRAN) tablet 4 mg, 4 mg, Oral, Q6H PRN, Gerry Trinidad MD  •  pantoprazole (PROTONIX) EC tablet 40 mg, 40 mg, Oral, Daily, Gerry Trinidad MD, 40 mg at 04/17/18 0935  •  QUEtiapine (SEROquel) tablet 600 mg, 600 mg, Oral, Nightly, Gerry Trinidad MD, 600 mg at 04/16/18 2023  •  terazosin (HYTRIN) capsule 2 mg, 2 mg, Oral, Nightly, Gerry Trinidad MD, 2 mg at 04/16/18 2023  •  traZODone (DESYREL) tablet 50 mg, 50 mg, Oral, Nightly PRN, Gerry Trinidad MD, 50 mg at 04/16/18 2023  •  triamcinolone (KENALOG) 0.1 % cream, , Topical, Q8H, Isidro Murdock MD  •  ziprasidone (GEODON) injection 20 mg, 20 mg, Intramuscular, BID PRN, Gerry Trinidad MD    Diagnoses/Assessment:   Principal Problem:    Severe episode of recurrent major depressive disorder, with psychotic features  Active Problems:    Dementia in Alzheimer's disease with early onset     Overdose      Treatment Plan:    1) Will continue care for the patient on the behavioral health unit at [facility] to ensure patient safety.  2) Will continue to provide treatment with the unit milieu, activities, therapies and psychopharmacological management.  3) Patient to be placed on or continued on  Q15 minute checks  and Suicide and Aggression precautions.  4) Pertinent medical issues: No active medical concerns.  5) Will order following labs: none  6) Will make the following medication changes: Will continue the cymbalta and seroquel and prn geodon for agitation.  7) Will continue discharge planning as appropriate for patient.  8) Psychotherapy provided: none     Treatment plan and medication risks and benefits discussed with: Patient    Gerry Trinidad MD  04/17/18  4:29 PM
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
## 2018-04-17 NOTE — DISCHARGE PLACEMENT REQUEST
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 calciums, no further evaluation at this time.     Probable right inner ear fistula probably resolved, as he has no  persistent vertigo or dizziness.     Probable resolving contact dermatitis.  Patient prefers Atarax for itching and requests this at this time.  Will also use topical betamethasone for the most severe areas.     Hypertension, well controlled currently.     History of CVA with no residual weakness appreciated.         Continue Home Meds as ordered. Mental health and pain issues managed per psychiatry.  Further diagnostic studies or intervention based on hospital course.     Electronically signed by Isidro Murdock MD at 4/12/2018 11:25 AM

## 2018-04-17 NOTE — CONSULTS
Adult Nutrition  Assessment    Patient Name:  Nick Abreu  YOB: 1965  MRN: [id number]  Admit Date:  4/11/2018    Assessment Date:  4/17/2018    Comments:  Pt reports good appetite.  Voiced no food preferences.  Intake 100% - 7x, 75% - 1x.  Meds and labs reviewed.  Pt seen pre length of stay.          Adult Nutrition Assessment     Row Name 04/17/18 1500       Nutrition Prescription PO    Current PO Diet Regular       PO Evaluation    Number of Meals 8    % PO Intake --   100% - 7x, 75% - 1x    Row Name 04/17/18 1458       Calculation Measurements    Weight Used For Calculations 97.3 kg (214 lb 8.1 oz)       Estimated/Assessed Needs    Additional Documentation Calorie Requirements (Group);Fluid Requirements (Group);[place] Jeor Equation (Group);Protein Requirements (Group)       Calorie Requirements    Estimated Calorie Requirement (kcal/day) 2392    Estimated Calorie Need Method [place]-St Jeor       KCAL/KG    14 Kcal/Kg (kcal) 1362.2    15 Kcal/Kg (kcal) 1459.5    18 Kcal/Kg (kcal) 1751.4    20 Kcal/Kg (kcal) 1946    25 Kcal/Kg (kcal) 2432.5    30 Kcal/Kg (kcal) 2919    35 Kcal/Kg (kcal) 3405.5    40 Kcal/Kg (kcal) 3892    45 Kcal/Kg (kcal) 4378.5    50 Kcal/Kg (kcal) 4865       [place]-St. Jeor Equation    RMR ([place]-St. Jeor Equation) 1840.13       Protein Requirements    Est Protein Requirement Amount (gms/kg) 1.0 gm protein    Estimated Protein Requirements (gms/day) 97.3       Fluid Requirements    Estimated Fluid Requirements (mL/day) 2919    RDA Method (mL) 2919    [place] Method (over 20 kg) 3446    Row Name 04/17/18 1457       Reason for Assessment    Reason For Assessment per organizational policy   length of stay       Labs/Procedures/Meds    Lab Results Reviewed reviewed          Electronically signed by:  Graciela Brown RD  04/17/18 3:02 PM

## 2018-04-17 NOTE — PROGRESS NOTES
Patient will not interact with others and will not participate in any groups.  He has been encouraged to attend but patient continues to refuse.

## 2018-04-17 NOTE — PLAN OF CARE
Problem: Patient Care Overview  Goal: Plan of Care Review  Outcome: Ongoing (interventions implemented as appropriate)   04/17/18 1503   OTHER   Outcome Summary Encourage continued good intake.   Coping/Psychosocial   Plan of Care Reviewed With patient   Plan of Care Review   Progress no change

## 2018-04-17 NOTE — PLAN OF CARE
Problem: Patient Care Overview  Goal: Plan of Care Review  Outcome: Ongoing (interventions implemented as appropriate)      Problem: Overarching Goals (Adult)  Goal: Adheres to Safety Considerations for Self and Others  Outcome: Ongoing (interventions implemented as appropriate)    Goal: Optimized Coping Skills in Response to Life Stressors  Outcome: Ongoing (interventions implemented as appropriate)    Goal: Develops/Participates in Therapeutic [place] to Support Successful Transition  Outcome: Ongoing (interventions implemented as appropriate)      Problem: Suicidal Behavior (Adult)  Goal: Suicidal Behavior is Absent/Minimized/Managed  Outcome: Ongoing (interventions implemented as appropriate)  Patient denies any thoughts of suicide    Problem: Mood Impairment (Anxiety Signs/Symptoms) (Adult)  Goal: Improved Mood Symptoms (Anxiety Signs/Symptoms)  Outcome: Ongoing (interventions implemented as appropriate)  Patient's mood has improved but he is feeling anxious about what he plans to do at discharge.    Problem: Cognitive Impairment (Anxiety Signs/Symptoms) (Adult)  Goal: Improved Cognitive Function (Anxiety Signs/Symptoms)  Outcome: Ongoing (interventions implemented as appropriate)  Patient shows improved cognitive functioning.

## 2018-04-17 NOTE — NURSING NOTE
"Behavior   Anxiety: Feeling worried and Sleep disturbance  Depression: anxiety and disturbed sleep  Pain   0  AVH   no  S/I   no  H/I   no  Affect   flat and anxious    Patient resting in bed. Orientated x4. Patient denies SI and AVH. Reports that he is frustrated that he cannot reach his wife by phone.  Stating \"She has my clothes and everything I own, if it wasn't for that I would be ok\". \"She acted like everything was fine on Saturday and now she wont answer and her voicemail box is full so I cant even leave a message\".  Patient reports that he got ahold of his bank earlier today to have his card shut off because she was spending $100 a day while he was in the hospital.  Patient c/o difficulty falling asleep, PRN trazodone given.  Patient interacted well with staff and was polite, no agitation noted. Will continue to monitor.     Intervention  Medications reviewed and administered  Assessment complete    Response  Verbalized understanding   Took medications  Interacted with assessment    Plan  Will promote and reinforce current treatment plan and encourage involvement in care plan goals.   Will provide for safe, calm, quiet environment.  Will promote open communication with staff and foster a trusting/working relationship with patient.   Will promote participation in groups and therapies and independent reflection.      "

## 2018-04-18 PROCEDURE — 99232 SBSQ HOSP IP/OBS MODERATE 35: CPT | Performed by: PSYCHIATRY & NEUROLOGY

## 2018-04-18 RX ADMIN — ASPIRIN 81 MG 81 MG: 81 TABLET ORAL at 08:18

## 2018-04-18 RX ADMIN — DULOXETINE HYDROCHLORIDE 90 MG: 60 CAPSULE, DELAYED RELEASE ORAL at 08:17

## 2018-04-18 RX ADMIN — PANTOPRAZOLE SODIUM 40 MG: 40 TABLET, DELAYED RELEASE ORAL at 08:17

## 2018-04-18 RX ADMIN — HYDROCHLOROTHIAZIDE 25 MG: 25 TABLET ORAL at 08:17

## 2018-04-18 RX ADMIN — METOPROLOL SUCCINATE 50 MG: 50 TABLET, EXTENDED RELEASE ORAL at 08:17

## 2018-04-18 RX ADMIN — CLOPIDOGREL BISULFATE 75 MG: 75 TABLET ORAL at 08:17

## 2018-04-18 RX ADMIN — ATENOLOL 50 MG: 50 TABLET ORAL at 08:19

## 2018-04-18 RX ADMIN — ATORVASTATIN CALCIUM 20 MG: 20 TABLET, FILM COATED ORAL at 20:54

## 2018-04-18 RX ADMIN — TERAZOSIN HYDROCHLORIDE ANHYDROUS 2 MG: 2 CAPSULE ORAL at 20:54

## 2018-04-18 RX ADMIN — QUETIAPINE FUMARATE 600 MG: 300 TABLET ORAL at 20:54

## 2018-04-18 RX ADMIN — DONEPEZIL HYDROCHLORIDE 10 MG: 10 TABLET ORAL at 20:54

## 2018-04-18 NOTE — PLAN OF CARE
Problem: Overarching Goals (Adult)  Goal: Adheres to Safety Considerations for Self and Others  Outcome: Ongoing (interventions implemented as appropriate)    Goal: Optimized Coping Skills in Response to Life Stressors  Outcome: Ongoing (interventions implemented as appropriate)

## 2018-04-18 NOTE — PLAN OF CARE
Problem: Patient Care Overview  Goal: Discharge Needs Assessment  Outcome: Ongoing (interventions implemented as appropriate)    Goal: Interprofessional Rounds/Family Conf  Outcome: Ongoing (interventions implemented as appropriate)      Problem: Overarching Goals (Adult)  Goal: Adheres to Safety Considerations for Self and Others  Outcome: Ongoing (interventions implemented as appropriate)    Goal: Optimized Coping Skills in Response to Life Stressors  Outcome: Ongoing (interventions implemented as appropriate)    Goal: Develops/Participates in Therapeutic [place] to Support Successful Transition  Outcome: Ongoing (interventions implemented as appropriate)

## 2018-04-18 NOTE — PROGRESS NOTES
4/18/2018    Chief Complaint: overdose    Subjective:  Patient is a 53 y.o. male who was hospitalized for overdose.   Patient is calm but concerned about his situation.  He will not get a check until 3rd of May and he does not have enough money to turn on his utilities at his home.  He is hoping to get into a personal care home.  Otherwise he will go to the shelter.  He is hoping that his wife will at least bring in his clothes for him.    Objective     Vital Signs    Temp:  [96 °F (35.6 °C)-98.8 °F (37.1 °C)] 96 °F (35.6 °C)  Heart Rate:  [77-80] 77  Resp:  [18] 18  BP: (105)/(54-61) 105/61    Physical Exam:   General Appearance: alert, appears stated age and cooperative,  Hygiene:   good  Gait & Station: Normal  Musculoskeletal: No tremors or abnormal involuntary movements    Mental Status Exam:   Cooperation:  Cooperative  Eye Contact:  Good  Psychomotor Behavior:  Appropriate  Mood: Worried  Affect:  constricted  Speech:  Normal  Thought Process:  Coherent  Associations: Goal Directed  Thought Content:     Normal   Suicidal:  None   Homicidal:  None   Hallucinations:  None   Delusion:  None  Cognitive Functioning:   Consciousness: awake, alert and oriented  Reliability:  fair  Insight:  Fair  Judgement:  Fair  Impulse Control:  Fair    Lab Results (last 24 hours)     ** No results found for the last 24 hours. **        Imaging Results (last 24 hours)     ** No results found for the last 24 hours. **          Medicine:   Current Facility-Administered Medications:   •  acetaminophen (TYLENOL) tablet 650 mg, 650 mg, Oral, Q4H PRN, Gerry Trinidad MD  •  aluminum-magnesium hydroxide-simethicone (MAALOX MAX) 400-400-40 MG/5ML suspension 15 mL, 15 mL, Oral, Q6H PRN, Gerry Trinidad MD  •  aspirin chewable tablet 81 mg, 81 mg, Oral, Daily, Gerry Trinidad MD, 81 mg at 04/18/18 0818  •  atenolol (TENORMIN) tablet 50 mg, 50 mg, Oral, Daily, Gerry Trinidad MD, 50 mg at 04/18/18 0819  •  atorvastatin (LIPITOR)  tablet 20 mg, 20 mg, Oral, Nightly, Gerry Trinidad MD, 20 mg at 04/17/18 2040  •  CloNIDine (CATAPRES) tablet 0.1 mg, 0.1 mg, Oral, Q4H PRN, Gerry Trinidad MD  •  clopidogrel (PLAVIX) tablet 75 mg, 75 mg, Oral, Daily, Gerry Trinidad MD, 75 mg at 04/18/18 0817  •  diphenhydrAMINE (BENADRYL) capsule 50 mg, 50 mg, Oral, Q6H PRN, Gerry Trinidad MD, 50 mg at 04/17/18 0935  •  docusate sodium (COLACE) capsule 100 mg, 100 mg, Oral, BID PRN, Gerry Trinidad MD  •  donepezil (ARICEPT) tablet 10 mg, 10 mg, Oral, Nightly, Gerry Trinidad MD, 10 mg at 04/17/18 2040  •  DULoxetine (CYMBALTA) DR capsule 90 mg, 90 mg, Oral, Daily, Gerry Trinidad MD, 90 mg at 04/18/18 0817  •  hydrochlorothiazide (HYDRODIURIL) tablet 25 mg, 25 mg, Oral, Daily, Gerry Trinidad MD, 25 mg at 04/18/18 0817  •  loperamide (IMODIUM) capsule 2 mg, 2 mg, Oral, 4x Daily PRN, Gerry Trinidad MD  •  magnesium hydroxide (MILK OF MAGNESIA) suspension 2400 mg/10mL 10 mL, 10 mL, Oral, Daily PRN, Gerry Trinidad MD  •  metoprolol succinate XL (TOPROL-XL) 24 hr tablet 50 mg, 50 mg, Oral, Daily, Gerry Trinidad MD, 50 mg at 04/18/18 0817  •  ondansetron (ZOFRAN) tablet 4 mg, 4 mg, Oral, Q6H PRN, Gerry Trinidad MD  •  pantoprazole (PROTONIX) EC tablet 40 mg, 40 mg, Oral, Daily, Gerry Trinidad MD, 40 mg at 04/18/18 0817  •  QUEtiapine (SEROquel) tablet 600 mg, 600 mg, Oral, Nightly, Gerry Trinidad MD, 600 mg at 04/17/18 2040  •  terazosin (HYTRIN) capsule 2 mg, 2 mg, Oral, Nightly, Gerry Trinidad MD, 2 mg at 04/17/18 2040  •  traZODone (DESYREL) tablet 50 mg, 50 mg, Oral, Nightly PRN, Gerry Trinidad MD, 50 mg at 04/17/18 2040  •  triamcinolone (KENALOG) 0.1 % cream, , Topical, Q8H, Isidro Murdock MD  •  ziprasidone (GEODON) injection 20 mg, 20 mg, Intramuscular, BID PRN, Gerry Trinidad MD    Diagnoses/Assessment:   Principal Problem:    Severe episode of recurrent major depressive disorder, with psychotic  features  Active Problems:    Dementia in Alzheimer's disease with early onset    Overdose      Treatment Plan:    1) Will continue care for the patient on the behavioral health unit at [facility] to ensure patient safety.  2) Will continue to provide treatment with the unit milieu, activities, therapies and psychopharmacological management.  3) Patient to be placed on or continued on  Q15 minute checks  and Suicide and Aggression precautions.  4) Pertinent medical issues: No active medical concerns.  5) Will order following labs: none  6) Will make the following medication changes: Will continue the cymbalta and seroquel and prn geodon for agitation.  7) Will continue discharge planning as appropriate for patient.  8) Psychotherapy provided: none     Treatment plan and medication risks and benefits discussed with: Patient    Gerry Trinidad MD  04/18/18  1:08 PM

## 2018-04-18 NOTE — PLAN OF CARE
Problem: Suicidal Behavior (Adult)  Goal: Suicidal Behavior is Absent/Minimized/Managed  Outcome: Ongoing (interventions implemented as appropriate)      Problem: Cognitive Impairment (Anxiety Signs/Symptoms) (Adult)  Goal: Improved Cognitive Function (Anxiety Signs/Symptoms)  Outcome: Ongoing (interventions implemented as appropriate)

## 2018-04-18 NOTE — NURSING NOTE
Behavior   Anxiety: Feeling anxious and Feeling worried  Depression: anxiety  Pain  0  AVH   no  S/I   no  H/I   no    Affect   flat    Note:  Nick is sitting in day room. He states he wants to go back home with his wife, but she is not returning any phone calls.  He states that he is not having any SI at this time. He is alert/oriented. He did complain this morning about his shower being too hot, so he was moved into another room.    He is able to make needs known.    His affect is flat.  He states he is worried about where he is going to go. Continue to monitor and provide for needs.      Intervention  Instructed in medication usage and effects  Medications administered as ordered  Encouraged to verbalize needs      Response  Verbalized understanding   Did patient take medications as ordered no  Did patient interact with assessment?  no    Plan  Will monitor for safety  Will monitor every 15 minutes as ordered  Will evaluate and promote the plan of care

## 2018-04-18 NOTE — NURSING NOTE
Behavior   Anxiety: No s/s of anxiety  Depression: depressed mood   Pain   0  AVH   no  S/I   no  H/I   no  Affect   depressed    Patient speech is clear, makes appropriate eye contact, dressed appropriately. Patient is laying in bed appears to be sleep but woke up to take his medications. He denies hallucinations or SI/HI and appears to be calm but looks to be down. Patient wasn't very talkative tonight, after taking his medications he went back to sleep.              Intervention  Medications reviewed and administered  Assessment complete    Response  Verbalized understanding   Took medications  Interacted with assessment    Plan  Will promote and reinforce current treatment plan and encourage involvement in care plan goals.   Will provide for safe, calm, quiet environment.  Will promote open communication with staff and foster a trusting/working relationship with patient.   Will promote participation in groups and therapies and independent reflection.

## 2018-04-19 PROCEDURE — 99232 SBSQ HOSP IP/OBS MODERATE 35: CPT | Performed by: PSYCHIATRY & NEUROLOGY

## 2018-04-19 RX ADMIN — CLOPIDOGREL BISULFATE 75 MG: 75 TABLET ORAL at 08:33

## 2018-04-19 RX ADMIN — HYDROCHLOROTHIAZIDE 25 MG: 25 TABLET ORAL at 08:33

## 2018-04-19 RX ADMIN — ASPIRIN 81 MG 81 MG: 81 TABLET ORAL at 08:33

## 2018-04-19 RX ADMIN — PANTOPRAZOLE SODIUM 40 MG: 40 TABLET, DELAYED RELEASE ORAL at 08:33

## 2018-04-19 RX ADMIN — METOPROLOL SUCCINATE 50 MG: 50 TABLET, EXTENDED RELEASE ORAL at 08:33

## 2018-04-19 RX ADMIN — QUETIAPINE FUMARATE 600 MG: 300 TABLET ORAL at 20:31

## 2018-04-19 RX ADMIN — ATORVASTATIN CALCIUM 20 MG: 20 TABLET, FILM COATED ORAL at 20:31

## 2018-04-19 RX ADMIN — DONEPEZIL HYDROCHLORIDE 10 MG: 10 TABLET ORAL at 20:31

## 2018-04-19 RX ADMIN — DULOXETINE HYDROCHLORIDE 90 MG: 60 CAPSULE, DELAYED RELEASE ORAL at 08:33

## 2018-04-19 RX ADMIN — TERAZOSIN HYDROCHLORIDE ANHYDROUS 2 MG: 2 CAPSULE ORAL at 20:31

## 2018-04-19 RX ADMIN — ATENOLOL 50 MG: 50 TABLET ORAL at 08:33

## 2018-04-19 NOTE — PLAN OF CARE
Problem: Suicidal Behavior (Adult)  Goal: Suicidal Behavior is Absent/Minimized/Managed  Outcome: Ongoing (interventions implemented as appropriate)      Problem: Mood Impairment (Anxiety Signs/Symptoms) (Adult)  Goal: Improved Mood Symptoms (Anxiety Signs/Symptoms)  Outcome: Ongoing (interventions implemented as appropriate)

## 2018-04-19 NOTE — PLAN OF CARE
Problem: Mood Impairment (Anxiety Signs/Symptoms) (Adult)  Goal: Improved Mood Symptoms (Anxiety Signs/Symptoms)  Outcome: Ongoing (interventions implemented as appropriate)      Problem: Cognitive Impairment (Anxiety Signs/Symptoms) (Adult)  Goal: Improved Cognitive Function (Anxiety Signs/Symptoms)  Outcome: Ongoing (interventions implemented as appropriate)         atraumatic/normal range of motion

## 2018-04-19 NOTE — NURSING NOTE
"Patient is set to be discharged today  He is anxious about this due to he can not get a hold of his wife that he gave his debit card to and that has not answered his phone calls.  He is going to have to go to a personal care home or shelter until  He can get his money situation straight.   He was short with signee this morning when he requested a snack and states \"Its a shame a 53 year old man has to ask for a snack\"  I got his snack for him but also explained that he is needing to wait and be patient.   He is compliant with his medication.    "

## 2018-04-19 NOTE — NURSING NOTE
Behavior   Anxiety: Patient denies  Depression: Patient denies  Pain  0  AVH   no  S/I   no  H/I   no    Affect   Blunted    Note: Patient resting in room. Patient had appropriate eye contact. Patient speech was normal rate, rhythm, and volume. Patient had no visual abnormal movements. Patient reports no bowel movement today and good appetite.       Intervention  Instructed in medication usage and effects  Medications administered as ordered  Encouraged to verbalize needs      Response  Verbalized understanding   Did patient take medications as ordered yes   Did patient interact with assessment?  yes     Plan  Will monitor for safety  Will monitor every 15 minutes as ordered  Will evaluate and promote the plan of care

## 2018-04-19 NOTE — PROGRESS NOTES
4/19/2018    Chief Complaint: overdose    Subjective:  Patient is a 53 y.o. male who was hospitalized for overdose.       Patient is calm but concerned about his situation.  He will not get a check until 3rd of May and he does not have enough money to turn on his utilities at his home.  He is hoping to get into a personal care home.  Otherwise he will go to the shelter.  He is hoping that his wife will at least bring in his clothes for him.    Awaiting response from one personal care home.  Declined by two.    Objective     Vital Signs    Temp:  [97.5 °F (36.4 °C)-97.7 °F (36.5 °C)] 97.7 °F (36.5 °C)  Heart Rate:  [79-90] 79  Resp:  [18] 18  BP: (104-109)/(56-58) 109/56    Physical Exam:   General Appearance: alert, appears stated age and cooperative,  Hygiene:   good  Gait & Station: Normal  Musculoskeletal: No tremors or abnormal involuntary movements     Mental Status Exam:   Cooperation:  Cooperative  Eye Contact:  Good  Psychomotor Behavior:  Appropriate  Mood: Worried  Affect:  constricted  Speech:  Normal  Thought Process:  Coherent  Associations: Goal Directed  Thought Content:                Normal              Suicidal:  None              Homicidal:  None              Hallucinations:  None              Delusion:  None  Cognitive Functioning:              Consciousness: awake, alert and oriented  Reliability:  fair  Insight:  Fair  Judgement:  Fair  Impulse Control:  Fair    Lab Results (last 24 hours)     ** No results found for the last 24 hours. **        Imaging Results (last 24 hours)     ** No results found for the last 24 hours. **          Medicine:   Current Facility-Administered Medications:   •  acetaminophen (TYLENOL) tablet 650 mg, 650 mg, Oral, Q4H PRN, Gerry Trinidad MD  •  aluminum-magnesium hydroxide-simethicone (MAALOX MAX) 400-400-40 MG/5ML suspension 15 mL, 15 mL, Oral, Q6H PRN, Gerry Trinidad MD  •  aspirin chewable tablet 81 mg, 81 mg, Oral, Daily, Gerry Trinidad MD, 81 mg  at 04/19/18 0833  •  atenolol (TENORMIN) tablet 50 mg, 50 mg, Oral, Daily, Gerry Trinidad MD, 50 mg at 04/19/18 0833  •  atorvastatin (LIPITOR) tablet 20 mg, 20 mg, Oral, Nightly, Gerry Trinidad MD, 20 mg at 04/18/18 2054  •  CloNIDine (CATAPRES) tablet 0.1 mg, 0.1 mg, Oral, Q4H PRN, Gerry Trinidad MD  •  clopidogrel (PLAVIX) tablet 75 mg, 75 mg, Oral, Daily, Gerry Trinidad MD, 75 mg at 04/19/18 0833  •  diphenhydrAMINE (BENADRYL) capsule 50 mg, 50 mg, Oral, Q6H PRN, Gerry Trinidad MD, 50 mg at 04/17/18 0935  •  docusate sodium (COLACE) capsule 100 mg, 100 mg, Oral, BID PRN, Gerry Trinidad MD  •  donepezil (ARICEPT) tablet 10 mg, 10 mg, Oral, Nightly, Gerry Trinidad MD, 10 mg at 04/18/18 2054  •  DULoxetine (CYMBALTA) DR capsule 90 mg, 90 mg, Oral, Daily, Gerry Trinidad MD, 90 mg at 04/19/18 0833  •  hydrochlorothiazide (HYDRODIURIL) tablet 25 mg, 25 mg, Oral, Daily, Gerry Trinidad MD, 25 mg at 04/19/18 0833  •  loperamide (IMODIUM) capsule 2 mg, 2 mg, Oral, 4x Daily PRN, Gerry Trinidad MD  •  magnesium hydroxide (MILK OF MAGNESIA) suspension 2400 mg/10mL 10 mL, 10 mL, Oral, Daily PRN, Gerry Trinidad MD  •  metoprolol succinate XL (TOPROL-XL) 24 hr tablet 50 mg, 50 mg, Oral, Daily, Gerry Trinidad MD, 50 mg at 04/19/18 0833  •  ondansetron (ZOFRAN) tablet 4 mg, 4 mg, Oral, Q6H PRN, Gerry Trinidad MD  •  pantoprazole (PROTONIX) EC tablet 40 mg, 40 mg, Oral, Daily, Gerry Trinidad MD, 40 mg at 04/19/18 0833  •  QUEtiapine (SEROquel) tablet 600 mg, 600 mg, Oral, Nightly, Gerry Trinidad MD, 600 mg at 04/18/18 2054  •  terazosin (HYTRIN) capsule 2 mg, 2 mg, Oral, Nightly, Gerry Trinidad MD, 2 mg at 04/18/18 2054  •  traZODone (DESYREL) tablet 50 mg, 50 mg, Oral, Nightly PRN, Gerry Trinidad MD, 50 mg at 04/17/18 2040  •  triamcinolone (KENALOG) 0.1 % cream, , Topical, Q8H, Isidro Murdock MD  •  ziprasidone (GEODON) injection 20 mg, 20 mg, Intramuscular,  MARAL DODGE, Gerry Trinidad MD    Diagnoses/Assessment:   Principal Problem:    Severe episode of recurrent major depressive disorder, with psychotic features  Active Problems:    Dementia in Alzheimer's disease with early onset    Overdose      Treatment Plan:    1) Will continue care for the patient on the behavioral health unit at [facility] to ensure patient safety.  2) Will continue to provide treatment with the unit milieu, activities, therapies and psychopharmacological management.  3) Patient to be placed on or continued on  Q15 minute checks  and Suicide and Aggression precautions.  4) Pertinent medical issues: No active medical concerns.  5) Will order following labs: none  6) Will make the following medication changes: Will continue the cymbalta and seroquel.  7) Will continue discharge planning as appropriate for patient.  8) Psychotherapy provided: none     Treatment plan and medication risks and benefits discussed with: Patient    Gerry Trinidad MD  04/19/18  2:47 PM

## 2018-04-20 VITALS
RESPIRATION RATE: 18 BRPM | DIASTOLIC BLOOD PRESSURE: 72 MMHG | OXYGEN SATURATION: 95 % | TEMPERATURE: 96.4 F | WEIGHT: 214.4 LBS | BODY MASS INDEX: 30.02 KG/M2 | SYSTOLIC BLOOD PRESSURE: 107 MMHG | HEIGHT: 71 IN | HEART RATE: 92 BPM

## 2018-04-20 PROCEDURE — 99238 HOSP IP/OBS DSCHRG MGMT 30/<: CPT | Performed by: PSYCHIATRY & NEUROLOGY

## 2018-04-20 RX ORDER — TERAZOSIN 2 MG/1
2 CAPSULE ORAL NIGHTLY
Qty: 30 CAPSULE | Refills: 0 | Status: ON HOLD | OUTPATIENT
Start: 2018-04-20 | End: 2019-04-09 | Stop reason: SDUPTHER

## 2018-04-20 RX ORDER — ATORVASTATIN CALCIUM 20 MG/1
20 TABLET, FILM COATED ORAL NIGHTLY
Qty: 30 TABLET | Refills: 0 | Status: ON HOLD | OUTPATIENT
Start: 2018-04-20 | End: 2019-04-09 | Stop reason: SDUPTHER

## 2018-04-20 RX ORDER — ASPIRIN 81 MG/1
81 TABLET, CHEWABLE ORAL DAILY
Qty: 30 TABLET | Refills: 0 | Status: ON HOLD | OUTPATIENT
Start: 2018-04-20 | End: 2019-04-09 | Stop reason: SDUPTHER

## 2018-04-20 RX ORDER — DONEPEZIL HYDROCHLORIDE 10 MG/1
10 TABLET, FILM COATED ORAL NIGHTLY
Qty: 30 TABLET | Refills: 0 | Status: ON HOLD | OUTPATIENT
Start: 2018-04-20 | End: 2019-04-09 | Stop reason: SDUPTHER

## 2018-04-20 RX ORDER — ATENOLOL 50 MG/1
50 TABLET ORAL DAILY
Qty: 30 TABLET | Refills: 0 | Status: CANCELLED | OUTPATIENT
Start: 2018-04-20

## 2018-04-20 RX ORDER — TRIAMCINOLONE ACETONIDE 1 MG/G
CREAM TOPICAL EVERY 12 HOURS SCHEDULED
Qty: 45 G | Refills: 0 | Status: SHIPPED | OUTPATIENT
Start: 2018-04-20 | End: 2019-04-09 | Stop reason: HOSPADM

## 2018-04-20 RX ORDER — CLOPIDOGREL BISULFATE 75 MG/1
75 TABLET ORAL DAILY
Qty: 30 TABLET | Refills: 0 | Status: ON HOLD | OUTPATIENT
Start: 2018-04-20 | End: 2019-04-09 | Stop reason: SDUPTHER

## 2018-04-20 RX ORDER — METOPROLOL SUCCINATE 50 MG/1
50 TABLET, EXTENDED RELEASE ORAL DAILY
Qty: 30 TABLET | Refills: 0 | Status: ON HOLD | OUTPATIENT
Start: 2018-04-20 | End: 2019-04-09 | Stop reason: SDUPTHER

## 2018-04-20 RX ORDER — PANTOPRAZOLE SODIUM 40 MG/1
40 TABLET, DELAYED RELEASE ORAL DAILY
Qty: 30 TABLET | Refills: 0 | Status: ON HOLD | OUTPATIENT
Start: 2018-04-20 | End: 2019-04-09 | Stop reason: SDUPTHER

## 2018-04-20 RX ORDER — DULOXETIN HYDROCHLORIDE 30 MG/1
90 CAPSULE, DELAYED RELEASE ORAL DAILY
Qty: 90 CAPSULE | Refills: 0 | Status: ON HOLD | OUTPATIENT
Start: 2018-04-20 | End: 2019-04-09 | Stop reason: SDUPTHER

## 2018-04-20 RX ORDER — QUETIAPINE FUMARATE 300 MG/1
600 TABLET, FILM COATED ORAL NIGHTLY
Qty: 60 TABLET | Refills: 0 | Status: ON HOLD | OUTPATIENT
Start: 2018-04-20 | End: 2019-04-09 | Stop reason: SDUPTHER

## 2018-04-20 RX ORDER — HYDROCHLOROTHIAZIDE 25 MG/1
25 TABLET ORAL DAILY
Qty: 30 TABLET | Refills: 0 | Status: ON HOLD | OUTPATIENT
Start: 2018-04-20 | End: 2019-04-09 | Stop reason: SDUPTHER

## 2018-04-20 RX ORDER — DIPHENHYDRAMINE HCL 50 MG
50 CAPSULE ORAL EVERY 6 HOURS PRN
Qty: 60 CAPSULE | Refills: 0 | Status: SHIPPED | OUTPATIENT
Start: 2018-04-20 | End: 2019-04-09 | Stop reason: HOSPADM

## 2018-04-20 RX ADMIN — HYDROCHLOROTHIAZIDE 25 MG: 25 TABLET ORAL at 09:06

## 2018-04-20 RX ADMIN — DULOXETINE HYDROCHLORIDE 90 MG: 60 CAPSULE, DELAYED RELEASE ORAL at 09:06

## 2018-04-20 RX ADMIN — CLOPIDOGREL BISULFATE 75 MG: 75 TABLET ORAL at 09:06

## 2018-04-20 RX ADMIN — PANTOPRAZOLE SODIUM 40 MG: 40 TABLET, DELAYED RELEASE ORAL at 09:06

## 2018-04-20 RX ADMIN — ATENOLOL 50 MG: 50 TABLET ORAL at 09:06

## 2018-04-20 RX ADMIN — METOPROLOL SUCCINATE 50 MG: 50 TABLET, EXTENDED RELEASE ORAL at 09:06

## 2018-04-20 RX ADMIN — ASPIRIN 81 MG 81 MG: 81 TABLET ORAL at 09:06

## 2018-04-20 NOTE — NURSING NOTE
Patient ambulated from [facility] for discharge to shelter in [place], [place].  Patient stable with no s/sx of distress.  All discharge paperwork, prescription information and follow up appointments discussed with patient.  Patient verbalized understanding.  All paperwork signed.  Personal belongings returned to patient.
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
## 2018-04-20 NOTE — SIGNIFICANT NOTE
"   04/20/18 1312   Individual Counseling   Length of Session 30   Topic Safety/dc plan   Patient Response CSW met with pt 1:1 and reviewed safety/dc plan. Pt presented in the dayroom, casually dressed, alert and oriented x3. Mood is anxious, but fair. Affect is neutral. Pt does report \"feeling overwhelmed\" due to instability and unknown living situation. Pt having to dc to [facility] in [place] due to his family not returning phone calls about placement. Pt reports that he plans to stay at [facility] until he gets paid in 2 weeks and then will seek alternative placement. CSW called and got price from pharmacy for pt's meds, so that he could be assured he could afford them. CSW scheduled follow up at [facility] and advised pt to go to clinic within 1 week of dc. CSW advised pt to call Crisis Hotline as needed. PT verbalized understanding. Pt appears to be at baseline, has been much more irritable this admission and has consistently denied SI, HI, AVh. Pt would strongly benefit from structure and stability. Pt did not participate in group tx but was med compliant. Insight and judgement remain limited at best. BPRS was completed upon dc.      "

## 2018-04-20 NOTE — NURSING NOTE
"Behavior   Anxiety: Feeling worried  Depression: Patient denies  Pain  0  AVH   no  S/I   no  H/I   no    Affect   Blunted    Note: Patient resting in room. Patient had appropriate eye contact. Patient speech was normal rate, rhythm, and volume. Patient had no visual abnormal movements. Patient reports bowel movement today and good appetite. Patient states he \"feels worried about where I am going to go tomorrow when I leave. I can't quit thinking about it.\"      Intervention  Instructed in medication usage and effects  Medications administered as ordered  Encouraged to verbalize needs      Response  Verbalized understanding   Did patient take medications as ordered yes   Did patient interact with assessment?  yes     Plan  Will monitor for safety  Will monitor every 15 minutes as ordered  Will evaluate and promote the plan of care    "

## 2018-10-29 ENCOUNTER — HOSPITAL ENCOUNTER (EMERGENCY)
Facility: HOSPITAL | Age: 53
Discharge: HOME | End: 2018-10-29
Attending: EMERGENCY MEDICINE | Admitting: EMERGENCY MEDICINE

## 2018-10-29 ENCOUNTER — APPOINTMENT (OUTPATIENT)
Dept: CT IMAGING | Facility: HOSPITAL | Age: 53
End: 2018-10-29

## 2018-10-29 VITALS
WEIGHT: 210 LBS | DIASTOLIC BLOOD PRESSURE: 81 MMHG | SYSTOLIC BLOOD PRESSURE: 149 MMHG | RESPIRATION RATE: 18 BRPM | OXYGEN SATURATION: 96 % | HEART RATE: 84 BPM | HEIGHT: 72 IN | BODY MASS INDEX: 28.44 KG/M2 | TEMPERATURE: 97.9 F

## 2018-10-29 DIAGNOSIS — L03.211 FACIAL CELLULITIS: Primary | ICD-10-CM

## 2018-10-29 LAB
ANION GAP SERPL CALCULATED.3IONS-SCNC: 6 MMOL/L (ref 5–15)
BASOPHILS # BLD AUTO: 0.01 10*3/MM3 (ref 0–0.2)
BASOPHILS NFR BLD AUTO: 0.1 % (ref 0–2)
BUN BLD-MCNC: 13 MG/DL (ref 7–21)
BUN/CREAT SERPL: 15.5 (ref 7–25)
CALCIUM SPEC-SCNC: 10.4 MG/DL (ref 8.4–10.2)
CHLORIDE SERPL-SCNC: 105 MMOL/L (ref 95–110)
CO2 SERPL-SCNC: 25 MMOL/L (ref 22–31)
CREAT BLD-MCNC: 0.84 MG/DL (ref 0.7–1.3)
DEPRECATED RDW RBC AUTO: 42 FL (ref 35.1–43.9)
EOSINOPHIL # BLD AUTO: 0.25 10*3/MM3 (ref 0–0.7)
EOSINOPHIL NFR BLD AUTO: 2.4 % (ref 0–7)
ERYTHROCYTE [DISTWIDTH] IN BLOOD BY AUTOMATED COUNT: 12.5 % (ref 11.5–14.5)
GFR SERPL CREATININE-BSD FRML MDRD: 96 ML/MIN/1.73 (ref 56–130)
GLUCOSE BLD-MCNC: 113 MG/DL (ref 60–100)
HCT VFR BLD AUTO: 46 % (ref 39–49)
HGB BLD-MCNC: 16.7 G/DL (ref 13.7–17.3)
HOLD SPECIMEN: NORMAL
IMM GRANULOCYTES # BLD: 0.02 10*3/MM3 (ref 0–0.02)
IMM GRANULOCYTES NFR BLD: 0.2 % (ref 0–0.5)
LYMPHOCYTES # BLD AUTO: 1.82 10*3/MM3 (ref 0.6–4.2)
LYMPHOCYTES NFR BLD AUTO: 17.7 % (ref 10–50)
MCH RBC QN AUTO: 33.7 PG (ref 26.5–34)
MCHC RBC AUTO-ENTMCNC: 36.3 G/DL (ref 31.5–36.3)
MCV RBC AUTO: 92.9 FL (ref 80–98)
MONOCYTES # BLD AUTO: 0.74 10*3/MM3 (ref 0–0.9)
MONOCYTES NFR BLD AUTO: 7.2 % (ref 0–12)
NEUTROPHILS # BLD AUTO: 7.42 10*3/MM3 (ref 2–8.6)
NEUTROPHILS NFR BLD AUTO: 72.4 % (ref 37–80)
PLATELET # BLD AUTO: 138 10*3/MM3 (ref 150–450)
PMV BLD AUTO: 11.2 FL (ref 8–12)
POTASSIUM BLD-SCNC: 3.5 MMOL/L (ref 3.5–5.1)
RBC # BLD AUTO: 4.95 10*6/MM3 (ref 4.37–5.74)
SODIUM BLD-SCNC: 136 MMOL/L (ref 137–145)
WBC NRBC COR # BLD: 10.26 10*3/MM3 (ref 3.2–9.8)
WHOLE BLOOD HOLD SPECIMEN: NORMAL

## 2018-10-29 PROCEDURE — 25010000002 MORPHINE PER 10 MG: Performed by: EMERGENCY MEDICINE

## 2018-10-29 PROCEDURE — 85025 COMPLETE CBC W/AUTO DIFF WBC: CPT | Performed by: EMERGENCY MEDICINE

## 2018-10-29 PROCEDURE — 80048 BASIC METABOLIC PNL TOTAL CA: CPT | Performed by: EMERGENCY MEDICINE

## 2018-10-29 PROCEDURE — 70487 CT MAXILLOFACIAL W/DYE: CPT

## 2018-10-29 PROCEDURE — 25010000002 IOPAMIDOL 61 % SOLUTION: Performed by: EMERGENCY MEDICINE

## 2018-10-29 PROCEDURE — 99284 EMERGENCY DEPT VISIT MOD MDM: CPT

## 2018-10-29 PROCEDURE — 96374 THER/PROPH/DIAG INJ IV PUSH: CPT

## 2018-10-29 RX ORDER — CLINDAMYCIN HYDROCHLORIDE 150 MG/1
450 CAPSULE ORAL 3 TIMES DAILY
Qty: 90 CAPSULE | Refills: 0 | Status: SHIPPED | OUTPATIENT
Start: 2018-10-29 | End: 2018-11-08

## 2018-10-29 RX ORDER — CLINDAMYCIN HYDROCHLORIDE 150 MG/1
600 CAPSULE ORAL ONCE
Status: COMPLETED | OUTPATIENT
Start: 2018-10-29 | End: 2018-10-29

## 2018-10-29 RX ORDER — SODIUM CHLORIDE 0.9 % (FLUSH) 0.9 %
10 SYRINGE (ML) INJECTION AS NEEDED
Status: DISCONTINUED | OUTPATIENT
Start: 2018-10-29 | End: 2018-10-29 | Stop reason: HOSPADM

## 2018-10-29 RX ORDER — HYDROCODONE BITARTRATE AND ACETAMINOPHEN 5; 325 MG/1; MG/1
1 TABLET ORAL ONCE
Status: COMPLETED | OUTPATIENT
Start: 2018-10-29 | End: 2018-10-29

## 2018-10-29 RX ADMIN — MORPHINE SULFATE 4 MG: 4 INJECTION INTRAVENOUS at 02:03

## 2018-10-29 RX ADMIN — HYDROCODONE BITARTRATE AND ACETAMINOPHEN 1 TABLET: 5; 325 TABLET ORAL at 04:42

## 2018-10-29 RX ADMIN — IOPAMIDOL 92 ML: 612 INJECTION, SOLUTION INTRAVENOUS at 03:51

## 2018-10-29 RX ADMIN — CLINDAMYCIN HYDROCHLORIDE 600 MG: 150 CAPSULE ORAL at 04:41

## 2019-01-17 ENCOUNTER — HOSPITAL ENCOUNTER (EMERGENCY)
Facility: HOSPITAL | Age: 54
Discharge: HOME | End: 2019-01-17
Attending: EMERGENCY MEDICINE | Admitting: EMERGENCY MEDICINE

## 2019-01-17 VITALS
HEART RATE: 72 BPM | DIASTOLIC BLOOD PRESSURE: 67 MMHG | HEIGHT: 72 IN | WEIGHT: 226 LBS | OXYGEN SATURATION: 96 % | SYSTOLIC BLOOD PRESSURE: 111 MMHG | RESPIRATION RATE: 18 BRPM | BODY MASS INDEX: 30.61 KG/M2 | TEMPERATURE: 97.4 F

## 2019-01-17 DIAGNOSIS — F03.90 DEMENTIA WITHOUT BEHAVIORAL DISTURBANCE, UNSPECIFIED DEMENTIA TYPE: ICD-10-CM

## 2019-01-17 DIAGNOSIS — F32.A DEPRESSION, UNSPECIFIED DEPRESSION TYPE: Primary | ICD-10-CM

## 2019-01-17 LAB
ALBUMIN SERPL-MCNC: 4.6 G/DL (ref 3.4–4.8)
ALBUMIN/GLOB SERPL: 1.7 G/DL (ref 1.1–1.8)
ALP SERPL-CCNC: 130 U/L (ref 38–126)
ALT SERPL W P-5'-P-CCNC: 19 U/L (ref 21–72)
AMPHET+METHAMPHET UR QL: NEGATIVE
ANION GAP SERPL CALCULATED.3IONS-SCNC: 8 MMOL/L (ref 5–15)
APAP SERPL-MCNC: <10 MCG/ML (ref 10–30)
AST SERPL-CCNC: 35 U/L (ref 17–59)
BARBITURATES UR QL SCN: NEGATIVE
BASOPHILS # BLD AUTO: 0.03 10*3/MM3 (ref 0–0.2)
BASOPHILS NFR BLD AUTO: 0.3 % (ref 0–2)
BENZODIAZ UR QL SCN: NEGATIVE
BILIRUB SERPL-MCNC: 0.6 MG/DL (ref 0.2–1.3)
BILIRUB UR QL STRIP: NEGATIVE
BUN BLD-MCNC: 14 MG/DL (ref 7–21)
BUN/CREAT SERPL: 14.4 (ref 7–25)
CALCIUM SPEC-SCNC: 10.9 MG/DL (ref 8.4–10.2)
CANNABINOIDS SERPL QL: NEGATIVE
CHLORIDE SERPL-SCNC: 103 MMOL/L (ref 95–110)
CLARITY UR: CLEAR
CO2 SERPL-SCNC: 25 MMOL/L (ref 22–31)
COCAINE UR QL: NEGATIVE
COLOR UR: ABNORMAL
CREAT BLD-MCNC: 0.97 MG/DL (ref 0.7–1.3)
DEPRECATED RDW RBC AUTO: 42 FL (ref 35.1–43.9)
EOSINOPHIL # BLD AUTO: 0.25 10*3/MM3 (ref 0–0.7)
EOSINOPHIL NFR BLD AUTO: 2.6 % (ref 0–7)
ERYTHROCYTE [DISTWIDTH] IN BLOOD BY AUTOMATED COUNT: 12.5 % (ref 11.5–14.5)
ETHANOL BLD-MCNC: <10 MG/DL (ref 0–10)
ETHANOL UR QL: <0.01 %
GFR SERPL CREATININE-BSD FRML MDRD: 81 ML/MIN/1.73 (ref 56–130)
GLOBULIN UR ELPH-MCNC: 2.7 GM/DL (ref 2.3–3.5)
GLUCOSE BLD-MCNC: 99 MG/DL (ref 60–100)
GLUCOSE UR STRIP-MCNC: NEGATIVE MG/DL
HCT VFR BLD AUTO: 48.5 % (ref 39–49)
HGB BLD-MCNC: 17.7 G/DL (ref 13.7–17.3)
HGB UR QL STRIP.AUTO: NEGATIVE
HOLD SPECIMEN: NORMAL
HOLD SPECIMEN: NORMAL
IMM GRANULOCYTES # BLD AUTO: 0.02 10*3/MM3 (ref 0–0.02)
IMM GRANULOCYTES NFR BLD AUTO: 0.2 % (ref 0–0.5)
KETONES UR QL STRIP: ABNORMAL
LEUKOCYTE ESTERASE UR QL STRIP.AUTO: NEGATIVE
LYMPHOCYTES # BLD AUTO: 2.33 10*3/MM3 (ref 0.6–4.2)
LYMPHOCYTES NFR BLD AUTO: 24.1 % (ref 10–50)
MCH RBC QN AUTO: 33.6 PG (ref 26.5–34)
MCHC RBC AUTO-ENTMCNC: 36.5 G/DL (ref 31.5–36.3)
MCV RBC AUTO: 92 FL (ref 80–98)
METHADONE UR QL SCN: NEGATIVE
MONOCYTES # BLD AUTO: 0.73 10*3/MM3 (ref 0–0.9)
MONOCYTES NFR BLD AUTO: 7.6 % (ref 0–12)
NEUTROPHILS # BLD AUTO: 6.29 10*3/MM3 (ref 2–8.6)
NEUTROPHILS NFR BLD AUTO: 65.2 % (ref 37–80)
NITRITE UR QL STRIP: NEGATIVE
OPIATES UR QL: NEGATIVE
OXYCODONE UR QL SCN: NEGATIVE
PH UR STRIP.AUTO: 5.5 [PH] (ref 5–9)
PLATELET # BLD AUTO: 221 10*3/MM3 (ref 150–450)
PMV BLD AUTO: 10.9 FL (ref 8–12)
POTASSIUM BLD-SCNC: 3.8 MMOL/L (ref 3.5–5.1)
PROT SERPL-MCNC: 7.3 G/DL (ref 6.3–8.6)
PROT UR QL STRIP: NEGATIVE
RBC # BLD AUTO: 5.27 10*6/MM3 (ref 4.37–5.74)
SALICYLATES SERPL-MCNC: <1 MG/DL (ref 10–20)
SODIUM BLD-SCNC: 136 MMOL/L (ref 137–145)
SP GR UR STRIP: 1.02 (ref 1–1.03)
UROBILINOGEN UR QL STRIP: ABNORMAL
WBC NRBC COR # BLD: 9.65 10*3/MM3 (ref 3.2–9.8)
WHOLE BLOOD HOLD SPECIMEN: NORMAL
WHOLE BLOOD HOLD SPECIMEN: NORMAL

## 2019-01-17 PROCEDURE — 80307 DRUG TEST PRSMV CHEM ANLYZR: CPT | Performed by: EMERGENCY MEDICINE

## 2019-01-17 PROCEDURE — 85025 COMPLETE CBC W/AUTO DIFF WBC: CPT | Performed by: EMERGENCY MEDICINE

## 2019-01-17 PROCEDURE — 80307 DRUG TEST PRSMV CHEM ANLYZR: CPT

## 2019-01-17 PROCEDURE — 81003 URINALYSIS AUTO W/O SCOPE: CPT | Performed by: EMERGENCY MEDICINE

## 2019-01-17 PROCEDURE — 80053 COMPREHEN METABOLIC PANEL: CPT | Performed by: EMERGENCY MEDICINE

## 2019-01-17 PROCEDURE — 99284 EMERGENCY DEPT VISIT MOD MDM: CPT

## 2019-01-17 RX ORDER — SODIUM CHLORIDE 0.9 % (FLUSH) 0.9 %
10 SYRINGE (ML) INJECTION AS NEEDED
Status: DISCONTINUED | OUTPATIENT
Start: 2019-01-17 | End: 2019-01-18 | Stop reason: HOSPADM

## 2019-01-18 NOTE — ED NOTES
Send the fax and talked with Barbara from [facility], she states that called me back when they get the fax     Shelia Szymanski, RNA  01/17/19 2121

## 2019-01-18 NOTE — ED NOTES
Talked with dago Parks and pt ready for the tele conference     Shelia Szymanski, RNA  01/17/19 5921
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
## 2019-01-18 NOTE — ED NOTES
Behavior health call back and informed that they don't have any adults beds available.  Dr. Eubanks informed that with need to called Shelia Zimmerman, RNA  01/17/19 2053

## 2019-01-18 NOTE — ED NOTES
Pt states all he thinks all he thinks is about wanting to die or to be dead. Pt unable to answer some questions like what day is today and the address where he live and the medications he take daily.       Shelia Szymanski, RNA  01/17/19 1945

## 2019-01-18 NOTE — ED NOTES
Pt informed of the safety plan and he sign it. Send the back to [facility] and give the discharge papers.     Shelia Szymanski, RNA  01/17/19 2001

## 2019-01-18 NOTE — ED NOTES
Sarah Beth from [facility] called and told me that  She talked with Dr. Eubanks about discharge the pt. She will send the discharge safety plan.     Shelia Szymanski, RNA  01/17/19 8633

## 2019-01-18 NOTE — ED NOTES
Patient is in yellow gown and security has gathered his belongings. This tech sitting at bedside.     Tata Euceda  01/17/19 1930

## 2019-01-18 NOTE — ED NOTES
Pt placed on yellow gown, 1:1 supervision and called security for personal belonings     Shelia Szymanski, RNA  01/17/19 1940

## 2019-01-18 NOTE — ED NOTES
This RN spoke with [facility] about Psych consult. Brittany stated they would see patient shortly.     Celia Roldan, RN  01/17/19 2044

## 2019-03-30 ENCOUNTER — HOSPITAL ENCOUNTER (EMERGENCY)
Facility: HOSPITAL | Age: 54
Discharge: STILL ADMITTED OR ADMINISTRATIVE | End: 2019-03-30
Attending: FAMILY MEDICINE

## 2019-03-30 ENCOUNTER — HOSPITAL ENCOUNTER (INPATIENT)
Facility: HOSPITAL | Age: 54
LOS: 10 days | Discharge: HOME | End: 2019-04-09
Attending: PSYCHIATRY & NEUROLOGY | Admitting: PSYCHIATRY & NEUROLOGY

## 2019-03-30 ENCOUNTER — APPOINTMENT (OUTPATIENT)
Dept: CT IMAGING | Facility: HOSPITAL | Age: 54
End: 2019-03-30

## 2019-03-30 VITALS
HEART RATE: 62 BPM | BODY MASS INDEX: 27.9 KG/M2 | HEIGHT: 72 IN | SYSTOLIC BLOOD PRESSURE: 143 MMHG | WEIGHT: 206 LBS | DIASTOLIC BLOOD PRESSURE: 84 MMHG | RESPIRATION RATE: 18 BRPM | OXYGEN SATURATION: 97 % | TEMPERATURE: 98.2 F

## 2019-03-30 DIAGNOSIS — F23 BRIEF PSYCHOTIC DISORDER (HCC): Primary | ICD-10-CM

## 2019-03-30 PROBLEM — F29 PSYCHOSIS (HCC): Status: ACTIVE | Noted: 2019-03-30

## 2019-03-30 LAB
ALBUMIN SERPL-MCNC: 4.2 G/DL (ref 3.5–5.2)
ALBUMIN/GLOB SERPL: 1.2 G/DL
ALP SERPL-CCNC: 119 U/L (ref 39–117)
ALT SERPL W P-5'-P-CCNC: 26 U/L (ref 1–41)
AMPHET+METHAMPHET UR QL: POSITIVE
ANION GAP SERPL CALCULATED.3IONS-SCNC: 16 MMOL/L
APAP SERPL-MCNC: <5 MCG/ML (ref 10–30)
AST SERPL-CCNC: 21 U/L (ref 1–40)
BACTERIA UR QL AUTO: ABNORMAL /HPF
BARBITURATES UR QL SCN: POSITIVE
BASOPHILS # BLD AUTO: 0.05 10*3/MM3 (ref 0–0.2)
BASOPHILS NFR BLD AUTO: 0.6 % (ref 0–1.5)
BENZODIAZ UR QL SCN: NEGATIVE
BILIRUB SERPL-MCNC: 0.9 MG/DL (ref 0.2–1.2)
BILIRUB UR QL STRIP: ABNORMAL
BUN BLD-MCNC: 14 MG/DL (ref 6–20)
BUN/CREAT SERPL: 15.4 (ref 7–25)
CALCIUM SPEC-SCNC: 11 MG/DL (ref 8.6–10.5)
CANNABINOIDS SERPL QL: NEGATIVE
CHLORIDE SERPL-SCNC: 103 MMOL/L (ref 98–107)
CK MB SERPL-CCNC: 2.01 NG/ML
CK SERPL-CCNC: 75 U/L (ref 20–200)
CLARITY UR: CLEAR
CO2 SERPL-SCNC: 21 MMOL/L (ref 22–29)
COCAINE UR QL: NEGATIVE
COLOR UR: ABNORMAL
CREAT BLD-MCNC: 0.91 MG/DL (ref 0.76–1.27)
DEPRECATED RDW RBC AUTO: 39.2 FL (ref 37–54)
EOSINOPHIL # BLD AUTO: 0.1 10*3/MM3 (ref 0–0.4)
EOSINOPHIL NFR BLD AUTO: 1.2 % (ref 0.3–6.2)
ERYTHROCYTE [DISTWIDTH] IN BLOOD BY AUTOMATED COUNT: 12.1 % (ref 12.3–15.4)
ETHANOL BLD-MCNC: <10 MG/DL (ref 0–10)
ETHANOL UR QL: <0.01 %
FLUAV AG NPH QL: NEGATIVE
FLUBV AG NPH QL IA: NEGATIVE
GFR SERPL CREATININE-BSD FRML MDRD: 87 ML/MIN/1.73
GLOBULIN UR ELPH-MCNC: 3.6 GM/DL
GLUCOSE BLD-MCNC: 97 MG/DL (ref 65–99)
GLUCOSE BLDC GLUCOMTR-MCNC: 94 MG/DL (ref 70–130)
GLUCOSE UR STRIP-MCNC: NEGATIVE MG/DL
HCT VFR BLD AUTO: 47.6 % (ref 37.5–51)
HGB BLD-MCNC: 17.1 G/DL (ref 13–17.7)
HGB UR QL STRIP.AUTO: NEGATIVE
HOLD SPECIMEN: NORMAL
HOLD SPECIMEN: NORMAL
HYALINE CASTS UR QL AUTO: ABNORMAL /LPF
IMM GRANULOCYTES # BLD AUTO: 0.02 10*3/MM3 (ref 0–0.05)
IMM GRANULOCYTES NFR BLD AUTO: 0.2 % (ref 0–0.5)
KETONES UR QL STRIP: ABNORMAL
LEUKOCYTE ESTERASE UR QL STRIP.AUTO: NEGATIVE
LYMPHOCYTES # BLD AUTO: 1.63 10*3/MM3 (ref 0.7–3.1)
LYMPHOCYTES NFR BLD AUTO: 18.9 % (ref 19.6–45.3)
MAGNESIUM SERPL-MCNC: 2.1 MG/DL (ref 1.6–2.6)
MCH RBC QN AUTO: 31.9 PG (ref 26.6–33)
MCHC RBC AUTO-ENTMCNC: 35.9 G/DL (ref 31.5–35.7)
MCV RBC AUTO: 88.8 FL (ref 79–97)
METHADONE UR QL SCN: NEGATIVE
MONOCYTES # BLD AUTO: 0.58 10*3/MM3 (ref 0.1–0.9)
MONOCYTES NFR BLD AUTO: 6.7 % (ref 5–12)
NEUTROPHILS # BLD AUTO: 6.26 10*3/MM3 (ref 1.4–7)
NEUTROPHILS NFR BLD AUTO: 72.4 % (ref 42.7–76)
NITRITE UR QL STRIP: NEGATIVE
NRBC BLD AUTO-RTO: 0 /100 WBC (ref 0–0)
OPIATES UR QL: NEGATIVE
OXYCODONE UR QL SCN: NEGATIVE
PH UR STRIP.AUTO: 6 [PH] (ref 5–9)
PLATELET # BLD AUTO: 183 10*3/MM3 (ref 140–450)
PMV BLD AUTO: 10.6 FL (ref 6–12)
POTASSIUM BLD-SCNC: 3.6 MMOL/L (ref 3.5–5.2)
PROT SERPL-MCNC: 7.8 G/DL (ref 6–8.5)
PROT UR QL STRIP: ABNORMAL
RBC # BLD AUTO: 5.36 10*6/MM3 (ref 4.14–5.8)
RBC # UR: ABNORMAL /HPF
REF LAB TEST METHOD: ABNORMAL
SALICYLATES SERPL-MCNC: 1 MG/DL
SODIUM BLD-SCNC: 140 MMOL/L (ref 136–145)
SP GR UR STRIP: 1.03 (ref 1–1.03)
SQUAMOUS #/AREA URNS HPF: ABNORMAL /HPF
T4 FREE SERPL-MCNC: 1.47 NG/DL (ref 0.93–1.7)
TROPONIN T SERPL-MCNC: <0.01 NG/ML (ref 0–0.03)
TSH SERPL DL<=0.05 MIU/L-ACNC: 1.13 MIU/ML (ref 0.27–4.2)
UROBILINOGEN UR QL STRIP: ABNORMAL
WBC NRBC COR # BLD: 8.64 10*3/MM3 (ref 3.4–10.8)
WBC UR QL AUTO: ABNORMAL /HPF
WHOLE BLOOD HOLD SPECIMEN: NORMAL
WHOLE BLOOD HOLD SPECIMEN: NORMAL

## 2019-03-30 PROCEDURE — 80307 DRUG TEST PRSMV CHEM ANLYZR: CPT | Performed by: FAMILY MEDICINE

## 2019-03-30 PROCEDURE — 93005 ELECTROCARDIOGRAM TRACING: CPT | Performed by: FAMILY MEDICINE

## 2019-03-30 PROCEDURE — 82553 CREATINE MB FRACTION: CPT | Performed by: FAMILY MEDICINE

## 2019-03-30 PROCEDURE — 81001 URINALYSIS AUTO W/SCOPE: CPT | Performed by: FAMILY MEDICINE

## 2019-03-30 PROCEDURE — 25010000002 ONDANSETRON PER 1 MG: Performed by: FAMILY MEDICINE

## 2019-03-30 PROCEDURE — 82550 ASSAY OF CK (CPK): CPT | Performed by: FAMILY MEDICINE

## 2019-03-30 PROCEDURE — 87804 INFLUENZA ASSAY W/OPTIC: CPT | Performed by: FAMILY MEDICINE

## 2019-03-30 PROCEDURE — 80053 COMPREHEN METABOLIC PANEL: CPT | Performed by: FAMILY MEDICINE

## 2019-03-30 PROCEDURE — 96374 THER/PROPH/DIAG INJ IV PUSH: CPT

## 2019-03-30 PROCEDURE — 96361 HYDRATE IV INFUSION ADD-ON: CPT

## 2019-03-30 PROCEDURE — 82962 GLUCOSE BLOOD TEST: CPT

## 2019-03-30 PROCEDURE — 93010 ELECTROCARDIOGRAM REPORT: CPT | Performed by: INTERNAL MEDICINE

## 2019-03-30 PROCEDURE — 85025 COMPLETE CBC W/AUTO DIFF WBC: CPT | Performed by: FAMILY MEDICINE

## 2019-03-30 PROCEDURE — 93005 ELECTROCARDIOGRAM TRACING: CPT

## 2019-03-30 PROCEDURE — 84484 ASSAY OF TROPONIN QUANT: CPT | Performed by: FAMILY MEDICINE

## 2019-03-30 PROCEDURE — 84443 ASSAY THYROID STIM HORMONE: CPT | Performed by: FAMILY MEDICINE

## 2019-03-30 PROCEDURE — 70450 CT HEAD/BRAIN W/O DYE: CPT

## 2019-03-30 PROCEDURE — 36415 COLL VENOUS BLD VENIPUNCTURE: CPT | Performed by: FAMILY MEDICINE

## 2019-03-30 PROCEDURE — 83735 ASSAY OF MAGNESIUM: CPT | Performed by: FAMILY MEDICINE

## 2019-03-30 PROCEDURE — 99285 EMERGENCY DEPT VISIT HI MDM: CPT

## 2019-03-30 PROCEDURE — 84439 ASSAY OF FREE THYROXINE: CPT | Performed by: FAMILY MEDICINE

## 2019-03-30 RX ORDER — HYDROXYZINE PAMOATE 50 MG/1
50 CAPSULE ORAL EVERY 6 HOURS PRN
Status: DISCONTINUED | OUTPATIENT
Start: 2019-03-30 | End: 2019-04-09 | Stop reason: HOSPADM

## 2019-03-30 RX ORDER — ACETAMINOPHEN 325 MG/1
650 TABLET ORAL EVERY 4 HOURS PRN
Status: DISCONTINUED | OUTPATIENT
Start: 2019-03-30 | End: 2019-04-09 | Stop reason: HOSPADM

## 2019-03-30 RX ORDER — SODIUM CHLORIDE 0.9 % (FLUSH) 0.9 %
10 SYRINGE (ML) INJECTION AS NEEDED
Status: DISCONTINUED | OUTPATIENT
Start: 2019-03-30 | End: 2019-03-30 | Stop reason: HOSPADM

## 2019-03-30 RX ORDER — ONDANSETRON HCL IN 0.9 % NACL 8 MG/50 ML
8 INTRAVENOUS SOLUTION, PIGGYBACK (ML) INTRAVENOUS ONCE
Status: COMPLETED | OUTPATIENT
Start: 2019-03-30 | End: 2019-03-30

## 2019-03-30 RX ORDER — ALUMINA, MAGNESIA, AND SIMETHICONE 2400; 2400; 240 MG/30ML; MG/30ML; MG/30ML
15 SUSPENSION ORAL EVERY 6 HOURS PRN
Status: DISCONTINUED | OUTPATIENT
Start: 2019-03-30 | End: 2019-04-09 | Stop reason: HOSPADM

## 2019-03-30 RX ORDER — HYDROCODONE BITARTRATE AND ACETAMINOPHEN 10; 325 MG/1; MG/1
1 TABLET ORAL ONCE
Status: DISCONTINUED | OUTPATIENT
Start: 2019-03-30 | End: 2019-03-30

## 2019-03-30 RX ORDER — SODIUM CHLORIDE 9 MG/ML
125 INJECTION, SOLUTION INTRAVENOUS CONTINUOUS
Status: DISCONTINUED | OUTPATIENT
Start: 2019-03-30 | End: 2019-03-30 | Stop reason: HOSPADM

## 2019-03-30 RX ORDER — TRAZODONE HYDROCHLORIDE 50 MG/1
50 TABLET ORAL NIGHTLY PRN
Status: DISCONTINUED | OUTPATIENT
Start: 2019-03-30 | End: 2019-04-09 | Stop reason: HOSPADM

## 2019-03-30 RX ORDER — LOPERAMIDE HYDROCHLORIDE 2 MG/1
2 CAPSULE ORAL 4 TIMES DAILY PRN
Status: DISCONTINUED | OUTPATIENT
Start: 2019-03-30 | End: 2019-04-09 | Stop reason: HOSPADM

## 2019-03-30 RX ADMIN — ONDANSETRON 8 MG: 2 INJECTION, SOLUTION INTRAMUSCULAR; INTRAVENOUS at 14:58

## 2019-03-30 RX ADMIN — SODIUM CHLORIDE 125 ML/HR: 900 INJECTION, SOLUTION INTRAVENOUS at 13:57

## 2019-03-30 RX ADMIN — SODIUM CHLORIDE 1000 ML: 900 INJECTION, SOLUTION INTRAVENOUS at 14:18

## 2019-03-30 RX ADMIN — SODIUM CHLORIDE 125 ML/HR: 900 INJECTION, SOLUTION INTRAVENOUS at 14:19

## 2019-03-30 RX ADMIN — TRAZODONE HYDROCHLORIDE 50 MG: 50 TABLET ORAL at 23:48

## 2019-03-31 LAB
CHOLEST SERPL-MCNC: 100 MG/DL (ref 0–200)
GLUCOSE P FAST SERPL-MCNC: 97 MG/DL (ref 72–112)
HDLC SERPL-MCNC: 28 MG/DL (ref 40–60)
LDLC SERPL CALC-MCNC: 50 MG/DL (ref 0–100)
LDLC/HDLC SERPL: 1.79 {RATIO}
TRIGL SERPL-MCNC: 109 MG/DL (ref 0–150)
VLDLC SERPL-MCNC: 21.8 MG/DL

## 2019-03-31 PROCEDURE — 99232 SBSQ HOSP IP/OBS MODERATE 35: CPT | Performed by: FAMILY MEDICINE

## 2019-03-31 PROCEDURE — 80061 LIPID PANEL: CPT | Performed by: PSYCHIATRY & NEUROLOGY

## 2019-03-31 PROCEDURE — 93010 ELECTROCARDIOGRAM REPORT: CPT | Performed by: INTERNAL MEDICINE

## 2019-03-31 PROCEDURE — 82947 ASSAY GLUCOSE BLOOD QUANT: CPT | Performed by: PSYCHIATRY & NEUROLOGY

## 2019-03-31 PROCEDURE — 93005 ELECTROCARDIOGRAM TRACING: CPT | Performed by: PSYCHIATRY & NEUROLOGY

## 2019-03-31 PROCEDURE — 90791 PSYCH DIAGNOSTIC EVALUATION: CPT | Performed by: PSYCHIATRY & NEUROLOGY

## 2019-03-31 RX ORDER — TERAZOSIN 1 MG/1
1 CAPSULE ORAL NIGHTLY
Status: DISCONTINUED | OUTPATIENT
Start: 2019-03-31 | End: 2019-04-01

## 2019-03-31 RX ORDER — ASPIRIN 81 MG/1
81 TABLET, CHEWABLE ORAL DAILY
Status: DISCONTINUED | OUTPATIENT
Start: 2019-03-31 | End: 2019-04-09 | Stop reason: HOSPADM

## 2019-03-31 RX ORDER — DULOXETIN HYDROCHLORIDE 60 MG/1
60 CAPSULE, DELAYED RELEASE ORAL DAILY
Status: DISCONTINUED | OUTPATIENT
Start: 2019-03-31 | End: 2019-04-01

## 2019-03-31 RX ORDER — CLOPIDOGREL BISULFATE 75 MG/1
75 TABLET ORAL DAILY
Status: DISCONTINUED | OUTPATIENT
Start: 2019-03-31 | End: 2019-04-09 | Stop reason: HOSPADM

## 2019-03-31 RX ORDER — HYDROCHLOROTHIAZIDE 25 MG/1
25 TABLET ORAL DAILY
Status: DISCONTINUED | OUTPATIENT
Start: 2019-03-31 | End: 2019-04-09 | Stop reason: HOSPADM

## 2019-03-31 RX ORDER — METOPROLOL SUCCINATE 50 MG/1
50 TABLET, EXTENDED RELEASE ORAL DAILY
Status: DISCONTINUED | OUTPATIENT
Start: 2019-03-31 | End: 2019-04-09 | Stop reason: HOSPADM

## 2019-03-31 RX ORDER — ATORVASTATIN CALCIUM 20 MG/1
20 TABLET, FILM COATED ORAL NIGHTLY
Status: DISCONTINUED | OUTPATIENT
Start: 2019-03-31 | End: 2019-04-09 | Stop reason: HOSPADM

## 2019-03-31 RX ORDER — QUETIAPINE FUMARATE 100 MG/1
100 TABLET, FILM COATED ORAL NIGHTLY
Status: DISCONTINUED | OUTPATIENT
Start: 2019-03-31 | End: 2019-04-02

## 2019-03-31 RX ORDER — TRIAMCINOLONE ACETONIDE 1 MG/G
CREAM TOPICAL EVERY 8 HOURS SCHEDULED
Status: DISCONTINUED | OUTPATIENT
Start: 2019-03-31 | End: 2019-04-02

## 2019-03-31 RX ORDER — DONEPEZIL HYDROCHLORIDE 10 MG/1
10 TABLET, FILM COATED ORAL NIGHTLY
Status: DISCONTINUED | OUTPATIENT
Start: 2019-03-31 | End: 2019-04-09 | Stop reason: HOSPADM

## 2019-03-31 RX ORDER — PANTOPRAZOLE SODIUM 40 MG/1
40 TABLET, DELAYED RELEASE ORAL DAILY
Status: DISCONTINUED | OUTPATIENT
Start: 2019-03-31 | End: 2019-04-09 | Stop reason: HOSPADM

## 2019-03-31 RX ADMIN — HYDROCHLOROTHIAZIDE 25 MG: 25 TABLET ORAL at 18:09

## 2019-03-31 RX ADMIN — TRIAMCINOLONE ACETONIDE: 1 CREAM TOPICAL at 21:00

## 2019-03-31 RX ADMIN — METOPROLOL SUCCINATE 50 MG: 50 TABLET, EXTENDED RELEASE ORAL at 18:09

## 2019-03-31 RX ADMIN — DULOXETINE 60 MG: 60 CAPSULE, DELAYED RELEASE ORAL at 18:09

## 2019-03-31 RX ADMIN — QUETIAPINE 100 MG: 100 TABLET ORAL at 20:21

## 2019-03-31 RX ADMIN — ATORVASTATIN CALCIUM 20 MG: 20 TABLET, FILM COATED ORAL at 20:20

## 2019-03-31 RX ADMIN — TERAZOSIN HYDROCHLORIDE 1 MG: 1 CAPSULE ORAL at 20:20

## 2019-03-31 RX ADMIN — DONEPEZIL HYDROCHLORIDE 10 MG: 10 TABLET ORAL at 20:20

## 2019-03-31 RX ADMIN — ASPIRIN 81 MG CHEWABLE TABLET 81 MG: 81 TABLET CHEWABLE at 18:09

## 2019-03-31 RX ADMIN — CLOPIDOGREL BISULFATE 75 MG: 75 TABLET ORAL at 18:09

## 2019-03-31 RX ADMIN — TRIAMCINOLONE ACETONIDE: 1 CREAM TOPICAL at 15:12

## 2019-03-31 RX ADMIN — HYDROXYZINE PAMOATE 50 MG: 50 CAPSULE ORAL at 20:20

## 2019-03-31 RX ADMIN — PANTOPRAZOLE SODIUM 40 MG: 40 TABLET, DELAYED RELEASE ORAL at 18:09

## 2019-03-31 RX ADMIN — TRAZODONE HYDROCHLORIDE 50 MG: 50 TABLET ORAL at 20:44

## 2019-04-01 LAB
25(OH)D3 SERPL-MCNC: 17.8 NG/ML (ref 30–100)
ALBUMIN SERPL-MCNC: 4.1 G/DL (ref 3.5–5.2)
ALBUMIN/GLOB SERPL: 1.5 G/DL
ALP SERPL-CCNC: 113 U/L (ref 39–117)
ALT SERPL W P-5'-P-CCNC: 24 U/L (ref 1–41)
ANION GAP SERPL CALCULATED.3IONS-SCNC: 14 MMOL/L
AST SERPL-CCNC: 17 U/L (ref 1–40)
BILIRUB SERPL-MCNC: 0.3 MG/DL (ref 0.2–1.2)
BUN BLD-MCNC: 8 MG/DL (ref 6–20)
BUN/CREAT SERPL: 9.6 (ref 7–25)
CALCIUM SPEC-SCNC: 10.3 MG/DL (ref 8.6–10.5)
CHLORIDE SERPL-SCNC: 101 MMOL/L (ref 98–107)
CO2 SERPL-SCNC: 26 MMOL/L (ref 22–29)
CREAT BLD-MCNC: 0.83 MG/DL (ref 0.76–1.27)
FOLATE SERPL-MCNC: 6.45 NG/ML (ref 4.78–24.2)
GFR SERPL CREATININE-BSD FRML MDRD: 97 ML/MIN/1.73
GLOBULIN UR ELPH-MCNC: 2.7 GM/DL
GLUCOSE BLD-MCNC: 116 MG/DL (ref 65–99)
HIV1+2 AB SER QL: NORMAL
POTASSIUM BLD-SCNC: 3.4 MMOL/L (ref 3.5–5.2)
PROT SERPL-MCNC: 6.8 G/DL (ref 6–8.5)
SODIUM BLD-SCNC: 141 MMOL/L (ref 136–145)
VIT B12 BLD-MCNC: 398 PG/ML (ref 211–946)

## 2019-04-01 PROCEDURE — 82746 ASSAY OF FOLIC ACID SERUM: CPT | Performed by: PSYCHIATRY & NEUROLOGY

## 2019-04-01 PROCEDURE — 86592 SYPHILIS TEST NON-TREP QUAL: CPT | Performed by: PSYCHIATRY & NEUROLOGY

## 2019-04-01 PROCEDURE — 80053 COMPREHEN METABOLIC PANEL: CPT | Performed by: FAMILY MEDICINE

## 2019-04-01 PROCEDURE — 82607 VITAMIN B-12: CPT | Performed by: PSYCHIATRY & NEUROLOGY

## 2019-04-01 PROCEDURE — 82306 VITAMIN D 25 HYDROXY: CPT | Performed by: PSYCHIATRY & NEUROLOGY

## 2019-04-01 PROCEDURE — G0432 EIA HIV-1/HIV-2 SCREEN: HCPCS | Performed by: PSYCHIATRY & NEUROLOGY

## 2019-04-01 PROCEDURE — 99233 SBSQ HOSP IP/OBS HIGH 50: CPT | Performed by: PSYCHIATRY & NEUROLOGY

## 2019-04-01 RX ORDER — ALPRAZOLAM 2 MG/1
2 TABLET ORAL 3 TIMES DAILY PRN
COMMUNITY
End: 2019-04-09 | Stop reason: HOSPADM

## 2019-04-01 RX ORDER — PRIMIDONE 50 MG/1
50 TABLET ORAL NIGHTLY
COMMUNITY
End: 2019-04-09 | Stop reason: HOSPADM

## 2019-04-01 RX ORDER — DOXAZOSIN 2 MG/1
2 TABLET ORAL NIGHTLY
COMMUNITY
End: 2019-04-09 | Stop reason: HOSPADM

## 2019-04-01 RX ORDER — OMEPRAZOLE 40 MG/1
40 CAPSULE, DELAYED RELEASE ORAL DAILY
COMMUNITY
End: 2019-04-09 | Stop reason: HOSPADM

## 2019-04-01 RX ORDER — TOPIRAMATE 50 MG/1
50 TABLET, FILM COATED ORAL 2 TIMES DAILY
COMMUNITY
End: 2019-04-09 | Stop reason: HOSPADM

## 2019-04-01 RX ORDER — TERAZOSIN 2 MG/1
2 CAPSULE ORAL NIGHTLY
Status: DISCONTINUED | OUTPATIENT
Start: 2019-04-01 | End: 2019-04-08

## 2019-04-01 RX ORDER — RIVASTIGMINE TARTRATE 6 MG/1
6 CAPSULE ORAL 2 TIMES DAILY
COMMUNITY
End: 2019-04-09 | Stop reason: HOSPADM

## 2019-04-01 RX ORDER — LOSARTAN POTASSIUM 50 MG/1
100 TABLET ORAL DAILY
COMMUNITY
End: 2019-04-09 | Stop reason: HOSPADM

## 2019-04-01 RX ORDER — CLONAZEPAM 0.5 MG/1
1.5 TABLET ORAL 2 TIMES DAILY
Status: DISCONTINUED | OUTPATIENT
Start: 2019-04-01 | End: 2019-04-03

## 2019-04-01 RX ORDER — CITALOPRAM 40 MG/1
40 TABLET ORAL DAILY
COMMUNITY
End: 2019-04-09 | Stop reason: HOSPADM

## 2019-04-01 RX ORDER — DULOXETIN HYDROCHLORIDE 20 MG/1
40 CAPSULE, DELAYED RELEASE ORAL DAILY
Status: DISCONTINUED | OUTPATIENT
Start: 2019-04-02 | End: 2019-04-03

## 2019-04-01 RX ORDER — ATENOLOL 50 MG/1
50 TABLET ORAL DAILY
COMMUNITY
End: 2019-04-09 | Stop reason: HOSPADM

## 2019-04-01 RX ADMIN — PANTOPRAZOLE SODIUM 40 MG: 40 TABLET, DELAYED RELEASE ORAL at 08:48

## 2019-04-01 RX ADMIN — DONEPEZIL HYDROCHLORIDE 10 MG: 10 TABLET ORAL at 21:03

## 2019-04-01 RX ADMIN — TRIAMCINOLONE ACETONIDE 1 APPLICATION: 1 CREAM TOPICAL at 21:03

## 2019-04-01 RX ADMIN — METOPROLOL SUCCINATE 50 MG: 50 TABLET, EXTENDED RELEASE ORAL at 08:48

## 2019-04-01 RX ADMIN — CLOPIDOGREL BISULFATE 75 MG: 75 TABLET ORAL at 08:48

## 2019-04-01 RX ADMIN — TRAZODONE HYDROCHLORIDE 50 MG: 50 TABLET ORAL at 21:02

## 2019-04-01 RX ADMIN — DULOXETINE 60 MG: 60 CAPSULE, DELAYED RELEASE ORAL at 08:48

## 2019-04-01 RX ADMIN — HYDROCHLOROTHIAZIDE 25 MG: 25 TABLET ORAL at 08:48

## 2019-04-01 RX ADMIN — ATORVASTATIN CALCIUM 20 MG: 20 TABLET, FILM COATED ORAL at 21:02

## 2019-04-01 RX ADMIN — ASPIRIN 81 MG CHEWABLE TABLET 81 MG: 81 TABLET CHEWABLE at 08:48

## 2019-04-01 RX ADMIN — CLONAZEPAM 1.5 MG: 0.5 TABLET ORAL at 16:24

## 2019-04-01 RX ADMIN — TERAZOSIN HYDROCHLORIDE 2 MG: 2 CAPSULE ORAL at 21:02

## 2019-04-01 RX ADMIN — QUETIAPINE 100 MG: 100 TABLET ORAL at 21:03

## 2019-04-01 NOTE — PLAN OF CARE
Problem: Patient Care Overview  Goal: Plan of Care Review  Outcome: Ongoing (interventions implemented as appropriate)    Goal: Individualization and Mutuality  Outcome: Ongoing (interventions implemented as appropriate)    Goal: Discharge Needs Assessment  Outcome: Ongoing (interventions implemented as appropriate)    Goal: Interprofessional Rounds/Family Conf  Outcome: Ongoing (interventions implemented as appropriate)      Problem: Overarching Goals (Adult)  Goal: Adheres to Safety Considerations for Self and Others  Outcome: Ongoing (interventions implemented as appropriate)    Goal: Optimized Coping Skills in Response to Life Stressors  Outcome: Ongoing (interventions implemented as appropriate)    Goal: Develops/Participates in Therapeutic [place] to Support Successful Transition  Outcome: Ongoing (interventions implemented as appropriate)      Problem: Fall Risk (Adult)  Goal: Absence of Fall  Outcome: Ongoing (interventions implemented as appropriate)      Problem: Cognitive Impairment (Dementia Signs/Symptoms) (Adult)  Goal: Optimized Cognitive Function (Dementia Signs/Symptoms)  Outcome: Ongoing (interventions implemented as appropriate)      Problem: Psychological Impairment (Dementia Signs/Symptoms) (Adult)  Goal: Improved Psychological Symptoms (Dementia Signs/Symptoms)  Outcome: Ongoing (interventions implemented as appropriate)      Problem: Social/Functional Impairment (Dementia Signs/Symptoms) (Adult)  Goal: Improved Social/Functional Skills/Ability (Dementia Signs/Symptoms)  Outcome: Ongoing (interventions implemented as appropriate)      Problem: Suicidal Behavior (Adult)  Goal: Suicidal Behavior is Absent/Minimized/Managed  Outcome: Ongoing (interventions implemented as appropriate)

## 2019-04-01 NOTE — PROGRESS NOTES
Psychiatry Progress Note   4/1/2019      HD: #2    Chief Complaint: Gross Psychosis      Subjective --  Mr. Nick Abreu is a 54 y.o. male who was seen on the Adult unit.      Pleasantly confused today.  Pt seen in tx team.  Becomes irritable when asked about +AMP on UDS.  States he has no recall.  He cannot provide details of his situation.  Easily angered.  Denies any SI/HI.  His concentration and attention, as well as working memory, are exceptionally poor.  At times, is confused on unit.    SLUMS today is 12/30.      Review of outpt pharm records show Primidone (likely responsible for Barbs screen on UDS); high dose Xanax 2mg TID; no stimulants.      [facility] shows Rx for Xanax as above, Ambien Rx back in Jan.          Objective   Objective --      Vital Signs:  Temp:  [98.5 °F (36.9 °C)-98.6 °F (37 °C)] 98.5 °F (36.9 °C)  Heart Rate:  [71-74] 74  Resp:  [16-18] 18  BP: ()/(61-68) 99/61    Physical Exam:   -General Appearance:  alert and cooperative  -Hygiene:  Limited   -Gait & Station:  Blank multiple: Normal  -Musculoskeletal:  No tremors or abnormal involuntary movements and No atrophy noted no cogwheeling or rigidity.  -HEENT: No Nystagmus or ophthalmoplegia.    -Pulm: unlaboured      Mental Status Exam:   --Cooperation:  Cooperative  --Eye Contact:  Downcast  --Psychomotor Behavior:  Slow  --Mood:  Sad/Depressed  --Affect:  mood-congruent; confused  --Speech:  Slow, Soft, Variable volume and Word finding problems  --Thought Process:  [place], Sluggish and Disorganized  --Associations: Goal Directed and Circumstantial  --Themes:  Failure  --Thought Content:                --Mood congurent              --Suicidal:  Denies               --Homicidal:  Denies              --Hallucinations:  Visual              --Delusion:  Unable to demonstrate  --Cognitive Functioning:              -Consciousness: awake, alert and confused              -Orientation:  Person and Place              -Attention:   Distractible                         -Concentration:  Distractible              -Short Term Memory: Deficits              -Long Term Memory: Deficits              -Fund of Knowledge:  Below Average based on interaction  --Reliability:  impaired  --Insight:  Poor  --Judgment:  Impaired  --Impulse Control:  Impaired     --> SLUMS 1 April 2019: 12/30      Lab Results (last 24 hours)     Procedure Component Value Units Date/Time    Folate [[id number]]  (Normal) Collected:  04/01/19 0522    Specimen:  Blood Updated:  04/01/19 1237     Folate 6.45 ng/mL     Vitamin B12 [[id number]]  (Normal) Collected:  04/01/19 0522    Specimen:  Blood Updated:  04/01/19 1237     Vitamin B-12 398 pg/mL     Vitamin D 25 Hydroxy [[id number]]  (Abnormal) Collected:  04/01/19 0522    Specimen:  Blood Updated:  04/01/19 1237     25 Hydroxy, Vitamin D 17.8 ng/ml     Narrative:       Reference Range for Total Vitamin D 25(OH)     Deficiency <20.0 ng/mL   Insufficiency 21-29 ng/mL   Sufficiency  ng/mL  Toxicity >100 ng/ml    HIV-1 & HIV-2 Antibodies [[id number]] Collected:  04/01/19 0522    Specimen:  Blood Updated:  04/01/19 0938    Narrative:       The following orders were created for panel order HIV-1 & HIV-2 Antibodies.  Procedure                               Abnormality         Status                     ---------                               -----------         ------                     HIV-1 / O / 2 Ag / Antib...[[id number]]  Normal              Final result                 Please view results for these tests on the individual orders.    HIV-1 / O / 2 Ag / Antibody 4th Generation [[id number]]  (Normal) Collected:  04/01/19 0522    Specimen:  Blood Updated:  04/01/19 0938     HIV-1/ HIV-2 Non-Reactive    Narrative:       The HIV antibody/antigen combo assay is a qualitative assay for HIV that includes the p24 antigen as well as antibodies to HIV types 1 and 2. This test is intended to be used as a screening assay in the diagnosis of  HIV infection in patients over the age of 2.    Comprehensive Metabolic Panel [[id number]]  (Abnormal) Collected:  04/01/19 0522    Specimen:  Blood Updated:  04/01/19 0626     Glucose 116 mg/dL      BUN 8 mg/dL      Creatinine 0.83 mg/dL      Sodium 141 mmol/L      Potassium 3.4 mmol/L      Chloride 101 mmol/L      CO2 26.0 mmol/L      Calcium 10.3 mg/dL      Total Protein 6.8 g/dL      Albumin 4.10 g/dL      ALT (SGPT) 24 U/L      AST (SGOT) 17 U/L      Alkaline Phosphatase 113 U/L      Total Bilirubin 0.3 mg/dL      eGFR Non African Amer 97 mL/min/1.73      Globulin 2.7 gm/dL      A/G Ratio 1.5 g/dL      BUN/Creatinine Ratio 9.6     Anion Gap 14.0 mmol/L     Narrative:       GFR Normal >60  Chronic Kidney Disease <60  Kidney Failure <15    RPR [[id number]] Collected:  04/01/19 0522    Specimen:  Blood Updated:  04/01/19 0539      --reviewed    Imaging Results (last 24 hours)     ** No results found for the last 24 hours. **            Medications:   Scheduled Meds:  aspirin 81 mg Oral Daily   atorvastatin 20 mg Oral Nightly   clonazePAM 1.5 mg Oral BID   clopidogrel 75 mg Oral Daily   donepezil 10 mg Oral Nightly   [START ON 4/2/2019] DULoxetine 40 mg Oral Daily   hydrochlorothiazide 25 mg Oral Daily   metoprolol succinate XL 50 mg Oral Daily   pantoprazole 40 mg Oral Daily   QUEtiapine 100 mg Oral Nightly   terazosin 2 mg Oral Nightly   triamcinolone  Topical Q8H     Continuous Infusions:   PRN Meds:.•  acetaminophen  •  aluminum-magnesium hydroxide-simethicone  •  hydrOXYzine pamoate  •  loperamide  •  magnesium hydroxide  •  traZODone      Assessment:     Psychosis (CMS/HCC)  --MDD, rec, sev  --Anxiety d/o  --Neurocognitive d/o, concern for psychosis 2/to it.  --Stimulant & Sedative Use D/os       Treatment Plan:  1) Will continue care for the patient on the behavioral health unit at [facility] to ensure patient safety given Poor impulse control pose unexceptablly high risk for harm if currently  discharged, Absence of any protective factors currently placing patient at an unexceptably elevated risk of harm, Cognitive impairment posing risk of safety to self and others currently without safe discharge and Profound Functional impairment from his NCD causing need for further monitoring and medication management, with currently unexceptable level of risk for discharge.    2) Will continue to provide treatment with the unit milieu, activities, therapies and psychopharmacological management.    3) Patient to be placed on or continued on  Q15 minute checks  and Suicide precautions.    4) Pertinent Concurrent Non-Psychiatric Medical Issues:   --Dyslipidemia: Continue Lipitor  -Heart health: Continue aspirin 81 mg daily and Plavix 75 mg daily  -Hypertension: Continue HCTZ 25 mg daily metoprolol XL 50 mg daily  -Skin care continue Kenalog cream every 8 hours  -GERD continue Protonix 40 mg daily      5) Will order following labs: None    6) Behavioral Health Treatment Planning:  --Continue terazosin 2 mg at bedtime  -Continue Cymbalta decreased to 40 mg given it appears patient was not taking as an outpatient so was felt worse and activation irritability  -Quetiapine continue to 100 mg started last night  -Add Klonopin 1.5 mg twice a day given concern for benzodiazepine use at home, prescribed Xanax 2 mg 3 times a day, monitor for need as well as autonomic to make need to consider increase and slow taper given dose at home    7) Psychotherapy provided: Supportive, CBT/cognitive and Psycho-Educational for 19 minutes to target Affective Symptoms, Cognitive Dysfunction and To Build and Strengthen Rapport.     --> Dispostion Planning: To be decided, will need to move forward with a safe disposition planning, but difficult given no family engagement.  Continue to coordinate with treatment plan treatment team    Treatment plan and medication risks and benefits discussed with: Patient and Treatment Team.    Casimiro Rodríguez  MD ROXIE  04/01/19 @ 8:54 PM  Dictated using Dragon.

## 2019-04-01 NOTE — PLAN OF CARE
Problem: Patient Care Overview  Goal: Plan of Care Review  Outcome: Ongoing (interventions implemented as appropriate)    Goal: Individualization and Mutuality  Outcome: Ongoing (interventions implemented as appropriate)    Goal: Discharge Needs Assessment  Outcome: Ongoing (interventions implemented as appropriate)    Goal: Interprofessional Rounds/Family Conf  Outcome: Ongoing (interventions implemented as appropriate)      Problem: Overarching Goals (Adult)  Goal: Adheres to Safety Considerations for Self and Others  Outcome: Ongoing (interventions implemented as appropriate)    Goal: Optimized Coping Skills in Response to Life Stressors  Outcome: Ongoing (interventions implemented as appropriate)    Goal: Develops/Participates in Therapeutic [place] to Support Successful Transition  Outcome: Ongoing (interventions implemented as appropriate)      Problem: Fall Risk (Adult)  Goal: Absence of Fall  Outcome: Ongoing (interventions implemented as appropriate)

## 2019-04-01 NOTE — NURSING NOTE
Behavior   Note any precipitants to event or behavior   Describe level and action of any aggressive behavior or speech and associated interventions.     Anxiety: Excess Worry and Restless/Edgy  Depression: depressed mood and hopelessness  Pain  0  AVH   yes   S/I   no  Plan  no  H/I   no  Plan  no    Affect   flat      Note:  Pt denied SI and HI this am. He stated that he does have AVH without specificity. His hallucinations are random and not command per pt this am. He states he has a long history of AVH. He also states that he has a long history of depression and anxiety. He was fidgety this am during our conversation, not being still. He verbalized hopelessness. His depression was not specific. He stated some anxiety with routines on this unit such as asking for his clothes and delays in receiving his items. He has limited interaction with other patients.       Intervention    PRN medication utilized:  no    Instructed in medication usage and effects  Medications administered as ordered  Encouraged to verbalize needs      Response    Verbalized understanding   Did patient take medications as ordered yes   Did patient interact with assessment?  yes     Plan    Will monitor for safety  Will monitor every 15 minutes as ordered  Will evaluate and promote the plan of care    Last BM:  March 31, 2019  (Please chart in I/O as well)

## 2019-04-01 NOTE — PLAN OF CARE
Problem: Patient Care Overview  Goal: Plan of Care Review  Outcome: Ongoing (interventions implemented as appropriate)    Goal: Individualization and Mutuality  Outcome: Ongoing (interventions implemented as appropriate)    Goal: Discharge Needs Assessment  Outcome: Ongoing (interventions implemented as appropriate)    Goal: Interprofessional Rounds/Family Conf  Outcome: Ongoing (interventions implemented as appropriate)   04/01/19 0941   Interdisciplinary Rounds/Family Conf   Participants ;nursing;pastoral care;patient;psychiatrist;social work;therapeutic recreation
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
## 2019-04-01 NOTE — NURSING NOTE
"Behavior   Note any precipitants to event or behavior   Describe level and action of any aggressive behavior or speech and associated interventions.     Anxiety: Excess Worry and Restless/Edgy  Depression: hopelessness  Pain  0  AVH   yes   S/I   no  Plan  no  H/I   no  Plan  no    Affect   labile      Note:pt seen in personal room, has been isolative to room today, did not come out for snack, not interacting with peers.  Even though pt was laying in room when this nurse approached for medication, pt sat up in his bed quickly, began fidgeting with his covers, biting nails, and scratching feet.  Pt pointed out reddened areas on his left foot which pt states \"I believe its from all the bleach in these sheets, that's all that makes sense to me.\"  Pt was forthcoming with statement that he knows he \"sees things that arent really happening, but they still happen and he still sees them.\"  Pt answers questions, however seems suspicious as to reason for questions, looks at this nurse for a moment, appears to be forming an answer and seems selective in his answer.  Pt states he feels hopeless at times that his situation will improve, denies SI/HI, denies pain.  Pt requested trazodone to sleep and vistaril for anxiety.  Pt was very interested in medication education, states he has been on another medication at home for his memory, and gave conflicting information, first stating that he always takes his medications including up to admission here, then changed this stating,\"no I have taken anything in a while.\"  Pt could not account for discrepancy.  When asked to clarify, pt states, \"i said I always take them.\"  Pt states he had a bm today, denies any other needs, and remained in bed to sleep.      Intervention    PRN medication utilized:  yes - vistaril and trazodone    Instructed in medication usage and effects  Medications administered as ordered  Encouraged to verbalize needs      Response    Verbalized understanding   Did " patient take medications as ordered yes   Did patient interact with assessment?  yes     Plan    Will monitor for safety  Will monitor every 15 minutes as ordered  Will evaluate and promote the plan of care    Last BM:  March 31, 2019  (Please chart in I/O as well)

## 2019-04-02 LAB — RPR SER QL: NORMAL

## 2019-04-02 PROCEDURE — 99233 SBSQ HOSP IP/OBS HIGH 50: CPT | Performed by: PSYCHIATRY & NEUROLOGY

## 2019-04-02 RX ORDER — TRIAMCINOLONE ACETONIDE 1 MG/G
CREAM TOPICAL EVERY 8 HOURS PRN
Status: DISCONTINUED | OUTPATIENT
Start: 2019-04-02 | End: 2019-04-09 | Stop reason: HOSPADM

## 2019-04-02 RX ADMIN — DONEPEZIL HYDROCHLORIDE 10 MG: 10 TABLET ORAL at 20:53

## 2019-04-02 RX ADMIN — CLOPIDOGREL BISULFATE 75 MG: 75 TABLET ORAL at 08:11

## 2019-04-02 RX ADMIN — DULOXETINE HYDROCHLORIDE 40 MG: 20 CAPSULE, DELAYED RELEASE ORAL at 08:11

## 2019-04-02 RX ADMIN — PANTOPRAZOLE SODIUM 40 MG: 40 TABLET, DELAYED RELEASE ORAL at 08:12

## 2019-04-02 RX ADMIN — TERAZOSIN HYDROCHLORIDE 2 MG: 2 CAPSULE ORAL at 20:53

## 2019-04-02 RX ADMIN — ATORVASTATIN CALCIUM 20 MG: 20 TABLET, FILM COATED ORAL at 20:53

## 2019-04-02 RX ADMIN — ASPIRIN 81 MG CHEWABLE TABLET 81 MG: 81 TABLET CHEWABLE at 08:12

## 2019-04-02 RX ADMIN — CLONAZEPAM 1.5 MG: 0.5 TABLET ORAL at 20:53

## 2019-04-02 RX ADMIN — TRAZODONE HYDROCHLORIDE 50 MG: 50 TABLET ORAL at 20:53

## 2019-04-02 RX ADMIN — CLONAZEPAM 1.5 MG: 0.5 TABLET ORAL at 08:11

## 2019-04-02 RX ADMIN — QUETIAPINE 150 MG: 100 TABLET ORAL at 20:53
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
## 2019-04-02 NOTE — NURSING NOTE
Behavior   Note any precipitants to event or behavior   Describe level and action of any aggressive behavior or speech and associated interventions.     Anxiety: Excess Worry  Depression: depressed mood  Pain  0  AVH   no  S/I   no  Plan  no  H/I   no  Plan  no    Affect   labile      Note: Patient in his room sitting up in bed with eyes open. Patient reports a constant feeling of anxiety and depression that never lets up. Patient states level of both are the same every day at all times. Patient denies pain, H/I, S/I, AVH or problems sleeping. Patient tearful. Patient makes appropriate eye contact. Patient denies any needs at this time. Patient requested light to be turned out so he could sleep.      Intervention    PRN medication utilized:  no    Instructed in medication usage and effects  Medications administered as ordered  Encouraged to verbalize needs      Response    Verbalized understanding   Did patient take medications as ordered yes   Did patient interact with assessment?  yes     Plan    Will monitor for safety  Will monitor every 15 minutes as ordered  Will evaluate and promote the plan of care    Last BM:  March 31, 2019  (Please chart in I/O as well)

## 2019-04-02 NOTE — PLAN OF CARE
Problem: Social/Functional Impairment (Dementia Signs/Symptoms) (Adult)  Intervention: Promote Social, Functional Skills and Ability   04/02/19 1534   Promote Social, Functional Skills and Ability   Mutually Determined Action Steps (Promote Social/Functional Skills/Ability) develop quality of life goals;explores activities to increase life satisfaction   Individualized Action Step (Promote Social/Functional Skills/Ability) identify simplified leisure interest

## 2019-04-02 NOTE — NURSING NOTE
Behavior   Pt sleeping    Intervention  Safety Rounds complete    Response  Pt sleeping    Plan  Continue current plan

## 2019-04-02 NOTE — PLAN OF CARE
Problem: Patient Care Overview  Goal: Plan of Care Review  Outcome: Ongoing (interventions implemented as appropriate)    Goal: Individualization and Mutuality  Outcome: Ongoing (interventions implemented as appropriate)    Goal: Discharge Needs Assessment  Outcome: Ongoing (interventions implemented as appropriate)    Goal: Interprofessional Rounds/Family Conf  Outcome: Ongoing (interventions implemented as appropriate)      Problem: Overarching Goals (Adult)  Goal: Adheres to Safety Considerations for Self and Others  Outcome: Ongoing (interventions implemented as appropriate)    Goal: Optimized Coping Skills in Response to Life Stressors  Outcome: Ongoing (interventions implemented as appropriate)    Goal: Develops/Participates in Therapeutic [place] to Support Successful Transition  Outcome: Ongoing (interventions implemented as appropriate)      Problem: Fall Risk (Adult)  Goal: Absence of Fall  Outcome: Ongoing (interventions implemented as appropriate)      Problem: Cognitive Impairment (Dementia Signs/Symptoms) (Adult)  Goal: Optimized Cognitive Function (Dementia Signs/Symptoms)  Outcome: Ongoing (interventions implemented as appropriate)      Problem: Psychological Impairment (Dementia Signs/Symptoms) (Adult)  Goal: Improved Psychological Symptoms (Dementia Signs/Symptoms)  Outcome: Ongoing (interventions implemented as appropriate)      Problem: Social/Functional Impairment (Dementia Signs/Symptoms) (Adult)  Goal: Improved Social/Functional Skills/Ability (Dementia Signs/Symptoms)  Outcome: Ongoing (interventions implemented as appropriate)      Problem: Suicidal Behavior (Adult)  Goal: Suicidal Behavior is Absent/Minimized/Managed  Outcome: Ongoing (interventions implemented as appropriate)

## 2019-04-02 NOTE — NURSING NOTE
Communicated with patient's eldest daughter, Nitza Cain, about becoming Guardian for patient. This nurse was given permission by patient to give personal information without limits to both his son, Sae and his daughter Nitza. Nitza voiced some concerns because she had attempted to help him before. She stated that he has a hx of alcoholism, and this makes have a relationship with him difficult. She stated that Nick gave up his parental rights when she was younger. She appears to be the best candidate for Guardianship. His son and other daughter are not choices due to the son's reluctance and the other daughter's anger over past events. Nitza is to visit this afternoon with her father to determine if she wants to become his Guardian.
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
## 2019-04-02 NOTE — NURSING NOTE
"Behavior   Note any precipitants to event or behavior   Describe level and action of any aggressive behavior or speech and associated interventions.     Anxiety: Excess Worry, Restless/Edgy and Decreased concentration  Depression: depressed mood, difficulty concentrating, hopelessness and impaired memory  Pain  0  AVH   yes   S/I   no  Plan  no  H/I   no  Plan  no    Affect   flat      Note:  Pt denied SI and HI. He continues to state that he has AVH. He stated that he hears voices of people that he knows both alive and dead. He stated that one voice was his mother and he can see these people. He continues to states he has depression and anxiety. He did state that he feels he has improved since yesterday morning when asked specifically. He had no outward signs of anxiety during our conversation. He stated he did feel more \"relaxed\" than before. It was reported that he slept well, and he made appropriate eye contact.      Intervention    PRN medication utilized:  no    Instructed in medication usage and effects  Medications administered as ordered  Encouraged to verbalize needs      Response    Verbalized understanding   Did patient take medications as ordered yes   Did patient interact with assessment?  yes     Plan    Will monitor for safety  Will monitor every 15 minutes as ordered  Will evaluate and promote the plan of care    Last BM:  April 1, 2019  (Please chart in I/O as well)  "

## 2019-04-03 PROCEDURE — 99232 SBSQ HOSP IP/OBS MODERATE 35: CPT | Performed by: NURSE PRACTITIONER

## 2019-04-03 RX ORDER — CLONAZEPAM 0.5 MG/1
1.25 TABLET ORAL 2 TIMES DAILY
Status: DISCONTINUED | OUTPATIENT
Start: 2019-04-03 | End: 2019-04-04

## 2019-04-03 RX ORDER — PROPRANOLOL HYDROCHLORIDE 20 MG/1
20 TABLET ORAL ONCE
Status: COMPLETED | OUTPATIENT
Start: 2019-04-03 | End: 2019-04-03

## 2019-04-03 RX ORDER — DULOXETIN HYDROCHLORIDE 60 MG/1
60 CAPSULE, DELAYED RELEASE ORAL DAILY
Status: DISCONTINUED | OUTPATIENT
Start: 2019-04-04 | End: 2019-04-09 | Stop reason: HOSPADM

## 2019-04-03 RX ADMIN — ATORVASTATIN CALCIUM 20 MG: 20 TABLET, FILM COATED ORAL at 20:18

## 2019-04-03 RX ADMIN — PROPRANOLOL HYDROCHLORIDE 20 MG: 20 TABLET ORAL at 15:40

## 2019-04-03 RX ADMIN — TERAZOSIN HYDROCHLORIDE 2 MG: 2 CAPSULE ORAL at 20:18

## 2019-04-03 RX ADMIN — PANTOPRAZOLE SODIUM 40 MG: 40 TABLET, DELAYED RELEASE ORAL at 08:47

## 2019-04-03 RX ADMIN — DONEPEZIL HYDROCHLORIDE 10 MG: 10 TABLET ORAL at 20:18

## 2019-04-03 RX ADMIN — CLONAZEPAM 1.25 MG: 0.5 TABLET ORAL at 20:17

## 2019-04-03 RX ADMIN — CLONAZEPAM 1.5 MG: 0.5 TABLET ORAL at 08:43

## 2019-04-03 RX ADMIN — TRAZODONE HYDROCHLORIDE 50 MG: 50 TABLET ORAL at 20:18

## 2019-04-03 RX ADMIN — CLOPIDOGREL BISULFATE 75 MG: 75 TABLET ORAL at 08:47

## 2019-04-03 RX ADMIN — DULOXETINE HYDROCHLORIDE 40 MG: 20 CAPSULE, DELAYED RELEASE ORAL at 08:45

## 2019-04-03 RX ADMIN — ASPIRIN 81 MG CHEWABLE TABLET 81 MG: 81 TABLET CHEWABLE at 08:48

## 2019-04-03 RX ADMIN — HYDROCHLOROTHIAZIDE 25 MG: 25 TABLET ORAL at 08:46

## 2019-04-03 RX ADMIN — TRIAMCINOLONE ACETONIDE 1 APPLICATION: 1 CREAM TOPICAL at 08:43

## 2019-04-03 RX ADMIN — QUETIAPINE 150 MG: 100 TABLET ORAL at 20:18

## 2019-04-03 RX ADMIN — METOPROLOL SUCCINATE 50 MG: 50 TABLET, EXTENDED RELEASE ORAL at 08:46

## 2019-04-03 NOTE — NURSING NOTE
Behavior   Pt sleeping well tonight.  Trazodone does seem to help.    Intervention  Safety Rounds completed.    Response  Pt sleeping comfortably thru out night.  No distress noted.    Plan  Continue current plan.

## 2019-04-03 NOTE — PROGRESS NOTES
"4/3/2019    Chief Complaint: psychosis and Severe Neurocognitive Dysfunction    Subjective:     Mr. Nick Austin \"Tenzin\" Lois is a 54 yr old male that is inpatient on the adult U for Psychosis and neuro dysfunction. He was admitted on 3/30/2019     Tenzin is engaged during interview, he has been up and attending groups with peers. He states that he is resting in his room at night but not getting a solid sleep, that he wakes up many times during the night.  He reports no SI/HI but states that he feels like he just needs to get somewhere so that he can just sit the rest of his days away. He continues to have a downward look and does not make eye contact during the conversation.    He states that his daughter is supposed to be helping him find a place to live after discharge.    Tenzin is very flat and does not offer much conversation, he does present fairly engaged at this time compared to previous times.         Objective     Vital Signs    Temp:  [98.3 °F (36.8 °C)-98.7 °F (37.1 °C)] 98.3 °F (36.8 °C)  Heart Rate:  [] 102  Resp:  [18] 18  BP: (104-113)/(66-67) 104/67    Physical Exam:   General Appearance: alert, appears stated age and cooperative,  Hygiene:   fair  Gait & Station: Normal  Musculoskeletal: No tremors or abnormal involuntary movements    Mental Status Exam:   Cooperation:  Cooperative  Eye Contact:  Downcast  Psychomotor Behavior:  Slow  Mood: Sad/Depressed  Affect:  mood-congruent  Speech:  Monotone  Thought Process:  Poverty of thought and [place]  Associations: Goal Directed  Thought Content:     Mood congurent   Suicidal:  None   Homicidal:  None   Hallucinations:  States that he sees his mother in the room with him early in the morning, this is not a scary hallucination he states it is more comforting.    Delusion:  None  Cognitive Functioning:   Consciousness: awake and alert  Reliability:  impaired  Insight:  Poor  Judgement:  Impaired  Impulse Control:  Impaired    Lab Results (last 24 " hours)     ** No results found for the last 24 hours. **        Imaging Results (last 24 hours)     ** No results found for the last 24 hours. **          Medicine:   Current Facility-Administered Medications:   •  acetaminophen (TYLENOL) tablet 650 mg, 650 mg, Oral, Q4H PRN, Casimiro Rodríguez II, MD  •  aluminum-magnesium hydroxide-simethicone (MAALOX MAX) 400-400-40 MG/5ML suspension 15 mL, 15 mL, Oral, Q6H PRN, Casimiro Rodríguez II, MD  •  aspirin chewable tablet 81 mg, 81 mg, Oral, Daily, Casimiro Rodríguez II, MD, 81 mg at 04/03/19 0848  •  atorvastatin (LIPITOR) tablet 20 mg, 20 mg, Oral, Nightly, Casimiro Rodríguez II, MD, 20 mg at 04/02/19 2053  •  clonazePAM (KlonoPIN) tablet 1.5 mg, 1.5 mg, Oral, BID, Casimiro Rodríguez II, MD, 1.5 mg at 04/03/19 0843  •  clopidogrel (PLAVIX) tablet 75 mg, 75 mg, Oral, Daily, Casimiro Rodríguez II, MD, 75 mg at 04/03/19 0847  •  donepezil (ARICEPT) tablet 10 mg, 10 mg, Oral, Nightly, Casimiro Rodríguez II, MD, 10 mg at 04/02/19 2053  •  DULoxetine (CYMBALTA) DR capsule 40 mg, 40 mg, Oral, Daily, Casimiro Rodríguez II, MD, 40 mg at 04/03/19 0845  •  hydrochlorothiazide (HYDRODIURIL) tablet 25 mg, 25 mg, Oral, Daily, Casimiro Rodríguez II, MD, 25 mg at 04/03/19 0846  •  hydrOXYzine pamoate (VISTARIL) capsule 50 mg, 50 mg, Oral, Q6H PRN, Casimiro Rodríguez II, MD, 50 mg at 03/31/19 2020  •  loperamide (IMODIUM) capsule 2 mg, 2 mg, Oral, 4x Daily PRN, Casimiro Rodríguez II, MD  •  magnesium hydroxide (MILK OF MAGNESIA) suspension 2400 mg/10mL 10 mL, 10 mL, Oral, Daily PRN, Casimiro Rodríguez II, MD  •  metoprolol succinate XL (TOPROL-XL) 24 hr tablet 50 mg, 50 mg, Oral, Daily, Casimiro Rodríguez II, MD, 50 mg at 04/03/19 0846  •  pantoprazole (PROTONIX) EC tablet 40 mg, 40 mg, Oral, Daily, Casimiro Rodríguez II, MD, 40 mg at 04/03/19 0847  •  propranolol (INDERAL) tablet 20 mg, 20 mg, Oral, Once, Casimiro Rodríguez II, MD  •   QUEtiapine (SEROquel) tablet 150 mg, 150 mg, Oral, Nightly, Casimiro Rodríguez II, MD, 150 mg at 04/02/19 2053  •  terazosin (HYTRIN) capsule 2 mg, 2 mg, Oral, Nightly, Casimiro Rodríguez II, MD, 2 mg at 04/02/19 2053  •  traZODone (DESYREL) tablet 50 mg, 50 mg, Oral, Nightly PRN, Casimiro Rodríguez II, MD, 50 mg at 04/02/19 2053  •  triamcinolone (KENALOG) 0.1 % cream, , Topical, Q8H PRN, Casimiro Rodríguez II, MD, 1 application at 04/03/19 0843    Diagnoses/Assessment:     Psychosis (CMS/[facility])      Treatment Plan:    1) Will continue care for the patient on the behavioral health unit at [facility] to ensure patient safety.  2) Will continue to provide treatment with the unit milieu, activities, therapies and psychopharmacological management.  3) Patient to be placed on or continued on  Q15 minute checks  and Suicide precautions.  4) Pertinent medical issues:   --Dyslipidemia: Continue Lipitor  -Heart health: Continue aspirin 81 mg daily and Plavix 75 mg daily  -Hypertension: Continue HCTZ 25 mg daily metoprolol XL 50 mg daily  -Skin care continue Kenalog cream every 8 hours  -GERD continue Protonix 40 mg daily  5) Will order following labs: None  6) Will make the following medication changes:   --Continue terazosin 2 mg at bedtime  -increase Cymbalta  60 mg daily  -Quetiapine to 150mg QHS  --Decrease  Klonopin to 1.25 mg twice a day given concern for benzodiazepine use at home, prescribed Xanax 2 mg 3 times a day, monitor for need as well as autonomic to make need to consider increase and slow taper given dose at home  --one time dose of propanolol 20mg for increase nervousness    7) Will continue discharge planning as appropriate for patient.  8) Psychotherapy provided for 16-37 minutes.  Provided CBT and supportive therapy.    Treatment plan and medication risks and benefits discussed with: Patient    Aide Sullivan, MORRO  04/03/19  3:15 PM    I saw and examined the patient, as well  as being present during the key portions of the E/M service.      I agree with the history as documented by TIFF Sullivan:   --Dysphoric, very poor recall situation    I agree with the above exam: concur with exam as above    I agree with assessment and plan as outlined above:    --Medication changes as above, 17 minutes of therapy provided by myself for cognitive concerns, depression, maladaptive coping skills      Casimiro Rodríguez II, MD  Psychiatry & Behavioral Sciences  04/03/19  @ 9:45 PM

## 2019-04-03 NOTE — NURSING NOTE
Behavior   Note any precipitants to event or behavior   Describe level and action of any aggressive behavior or speech and associated interventions.     Anxiety: Excess Worry, Easily fatigued and Decreased sleep  Depression: depressed mood, anhedonia, fatigue, hopelessness and impaired memory  Pain  0  AVH   yes   S/I   no  Plan  no  H/I   no  Plan  no    Affect   flat and mood-congruent      Note:      Intervention    PRN medication utilized:  no    Instructed in medication usage and effects  Medications administered as ordered  Encouraged to verbalize needs      Response    Verbalized understanding   Did patient take medications as ordered yes   Did patient interact with assessment?  yes     Plan    Will monitor for safety  Will monitor every 15 minutes as ordered  Will evaluate and promote the plan of care    Last BM:  April 3, 2019  (Please chart in I/O as well)

## 2019-04-03 NOTE — NURSING NOTE
Behavior   Note any precipitants to event or behavior   Describe level and action of any aggressive behavior or speech and associated interventions.     Anxiety: 7  Depression: 7  Pain  0  AVH   no  S/I   no  Plan  no  H/I   no  Plan  no    Affect   flat      Note:  Patient spends free time sitting in common area, avoids interacting with peers.  Told this RN that daugh. from [place] came to visit and that visit went well.  States she is going to help him things that he has diff. handling.  States she told him she intends to keep in touch with him so that she knows when he needs help.  Ate snack, took meds, went to bed early.      Intervention    PRN medication utilized:  yes - trazodone for sleep.    Instructed in medication usage and effects  Medications administered as ordered  Encouraged to verbalize needs      Response    Verbalized understanding   Did patient take medications as ordered yes   Did patient interact with assessment?  yes     Plan    Will monitor for safety  Will monitor every 15 minutes as ordered  Will evaluate and promote the plan of care    Last BM:  unknown  (Please chart in I/O as well)

## 2019-04-03 NOTE — PROGRESS NOTES
"LCSW received call from pt's daughter, Nitza, and received consent from pt to speak with her re: his progress and disposition. Daughter inquiring about possibly filing for guardianship so that she can assist pt in placement options and managing his finances. Pt confirmed that he is interested in having daughter assist in this capacity. Daughter reports that she has not been involved in pt's life for sometime due to \"him burning bridges.\" However, daughter reports that she \"feels the need to help any way that I can.\" LCSW educated daughter on guardianship process, advised her to seek legal advice if needed. Daughter inquired about [facility]s that pt could potentially dc to. LCSW discussed recent placement attempts, in which pt ended up leaving several weeks after being placed. Daughter stated that she would explore options in her area of [facility] [place] and will follow up with pt re: his options.   "

## 2019-04-03 NOTE — PLAN OF CARE
Problem: Overarching Goals (Adult)  Goal: Optimized Coping Skills in Response to Life Stressors  Outcome: Ongoing (interventions implemented as appropriate)   04/03/19 1111   Overarching Goals (Adult)   Optimized Coping Skills in Response to Life Stressors making progress toward outcome     Intervention: Promote Effective Coping Strategies   04/03/19 1111   Coping/Psychosocial Interventions   Supportive Measures active listening utilized;goal setting facilitated;positive reinforcement provided;verbalization of feelings encouraged       Goal: Develops/Participates in Therapeutic [place] to Support Successful Transition  Outcome: Ongoing (interventions implemented as appropriate)   04/03/19 1111   Overarching Goals (Adult)   Develops/Participates in Therapeutic [place] to Support Successful Transition making progress toward outcome       Problem: Fall Risk (Adult)  Goal: Absence of Fall  Outcome: Ongoing (interventions implemented as appropriate)   04/03/19 1111   Fall Risk (Adult)   Absence of Fall making progress toward outcome       Problem: Suicidal Behavior (Adult)  Goal: Suicidal Behavior is Absent/Minimized/Managed  Outcome: Ongoing (interventions implemented as appropriate)   04/03/19 1111   Suicidal Behavior is Absent/Minimized/Managed   Suicidal Behavior Managed/Minimized Action Step (STG) Outcome making progress toward outcome

## 2019-04-03 NOTE — PLAN OF CARE
Problem: Patient Care Overview  Goal: Plan of Care Review  Outcome: Ongoing (interventions implemented as appropriate)      Problem: Overarching Goals (Adult)  Goal: Adheres to Safety Considerations for Self and Others  Outcome: Ongoing (interventions implemented as appropriate)    Goal: Optimized Coping Skills in Response to Life Stressors  Outcome: Ongoing (interventions implemented as appropriate)    Goal: Develops/Participates in Therapeutic [place] to Support Successful Transition  Outcome: Ongoing (interventions implemented as appropriate)      Problem: Cognitive Impairment (Dementia Signs/Symptoms) (Adult)  Goal: Optimized Cognitive Function (Dementia Signs/Symptoms)  Outcome: Ongoing (interventions implemented as appropriate)      Problem: Suicidal Behavior (Adult)  Goal: Suicidal Behavior is Absent/Minimized/Managed  Outcome: Ongoing (interventions implemented as appropriate)  Denies SI, rates depression/anxiety 7.

## 2019-04-04 PROBLEM — F03.90 MAJOR NEUROCOGNITIVE DISORDER (HCC): Status: ACTIVE | Noted: 2019-04-04

## 2019-04-04 PROBLEM — F15.90 STIMULANT USE DISORDER: Status: ACTIVE | Noted: 2019-04-04

## 2019-04-04 PROBLEM — F41.9 ANXIETY DISORDER: Status: ACTIVE | Noted: 2019-04-04

## 2019-04-04 PROBLEM — F13.11: Status: ACTIVE | Noted: 2019-04-04

## 2019-04-04 PROBLEM — F03.90 UNSPECIFIED DEMENTIA WITHOUT BEHAVIORAL DISTURBANCE: Status: ACTIVE | Noted: 2018-01-19

## 2019-04-04 PROCEDURE — 99232 SBSQ HOSP IP/OBS MODERATE 35: CPT | Performed by: NURSE PRACTITIONER

## 2019-04-04 RX ORDER — LANOLIN ALCOHOL/MO/W.PET/CERES
3 CREAM (GRAM) TOPICAL NIGHTLY
Status: DISCONTINUED | OUTPATIENT
Start: 2019-04-04 | End: 2019-04-09 | Stop reason: HOSPADM

## 2019-04-04 RX ORDER — CLONAZEPAM 0.5 MG/1
1 TABLET ORAL 2 TIMES DAILY
Status: DISCONTINUED | OUTPATIENT
Start: 2019-04-04 | End: 2019-04-09 | Stop reason: HOSPADM

## 2019-04-04 RX ADMIN — MELATONIN 3 MG: at 20:27

## 2019-04-04 RX ADMIN — CLONAZEPAM 1.25 MG: 0.5 TABLET ORAL at 08:25

## 2019-04-04 RX ADMIN — HYDROCHLOROTHIAZIDE 25 MG: 25 TABLET ORAL at 08:25

## 2019-04-04 RX ADMIN — QUETIAPINE 150 MG: 100 TABLET ORAL at 20:26

## 2019-04-04 RX ADMIN — CLONAZEPAM 1 MG: 0.5 TABLET ORAL at 20:26

## 2019-04-04 RX ADMIN — TRAZODONE HYDROCHLORIDE 50 MG: 50 TABLET ORAL at 20:27

## 2019-04-04 RX ADMIN — ATORVASTATIN CALCIUM 20 MG: 20 TABLET, FILM COATED ORAL at 20:26

## 2019-04-04 RX ADMIN — TERAZOSIN HYDROCHLORIDE 2 MG: 2 CAPSULE ORAL at 20:26

## 2019-04-04 RX ADMIN — CLOPIDOGREL BISULFATE 75 MG: 75 TABLET ORAL at 08:25

## 2019-04-04 RX ADMIN — DULOXETINE 60 MG: 60 CAPSULE, DELAYED RELEASE ORAL at 08:25

## 2019-04-04 RX ADMIN — DONEPEZIL HYDROCHLORIDE 10 MG: 10 TABLET ORAL at 20:26

## 2019-04-04 RX ADMIN — ASPIRIN 81 MG CHEWABLE TABLET 81 MG: 81 TABLET CHEWABLE at 08:25

## 2019-04-04 RX ADMIN — METOPROLOL SUCCINATE 50 MG: 50 TABLET, EXTENDED RELEASE ORAL at 08:25

## 2019-04-04 RX ADMIN — PANTOPRAZOLE SODIUM 40 MG: 40 TABLET, DELAYED RELEASE ORAL at 08:25

## 2019-04-04 NOTE — NURSING NOTE
Behavior   Pt sleeping well tonight.    Intervention  Safety Rounds completed.    Response  Pt sleeping soundly, no distress noted.    Plan  Continue current plan.

## 2019-04-04 NOTE — NURSING NOTE
"Behavior   Note any precipitants to event or behavior   Describe level and action of any aggressive behavior or speech and associated interventions.     Anxiety:           8                                                                                       Depression: 8  Pain  0  AVH   no  S/I   no  Plan  no  H/I   no  Plan  no    Affect   labile      Note:  Patient isolating in his room.  States  told him he would be here for \"long time.\"  Tried to explain this meant for few more days but patient insists  said \"long time.\"  States he can't go back to place with cousin.  Asked about relocating to [place] where daugh. will be closer to him.  States he is ok with this but does not know if he can afford own place in [place].  Took hs meds, ate snack, to bed early.  He did get upset with MHT, she asked him to join goal group, he refused.  She stated that was ok, she would put that he refused to attend.  This made patient mad, states its not that he refuses, he can't read/write papers.  States he can't participate so he does not go to groups.  He told MHT that she was \"hateful.\"      Intervention    PRN medication utilized:  yes - trazodone for sleep.    Instructed in medication usage and effects  Medications administered as ordered  Encouraged to verbalize needs      Response    Verbalized understanding   Did patient take medications as ordered yes   Did patient interact with assessment?  yes     Plan    Will monitor for safety  Will monitor every 15 minutes as ordered  Will evaluate and promote the plan of care    Last BM:  4/3/19  (Please chart in I/O as well)  "

## 2019-04-04 NOTE — PLAN OF CARE
Problem: Patient Care Overview  Goal: Plan of Care Review  Outcome: Ongoing (interventions implemented as appropriate)      Problem: Overarching Goals (Adult)  Goal: Adheres to Safety Considerations for Self and Others  Outcome: Ongoing (interventions implemented as appropriate)    Goal: Optimized Coping Skills in Response to Life Stressors  Outcome: Ongoing (interventions implemented as appropriate)    Goal: Develops/Participates in Therapeutic [place] to Support Successful Transition  Outcome: Ongoing (interventions implemented as appropriate)      Problem: Cognitive Impairment (Dementia Signs/Symptoms) (Adult)  Goal: Optimized Cognitive Function (Dementia Signs/Symptoms)  Outcome: Ongoing (interventions implemented as appropriate)  Patient cont. To be forgetful, is a/o x 2.

## 2019-04-04 NOTE — NURSING NOTE
Behavior   Note any precipitants to event or behavior   Describe level and action of any aggressive behavior or speech and associated interventions.     Anxiety: Restless/Edgy  Depression: depressed mood  Pain  0  AVH   yes   S/I   no  Plan  no  H/I   no  Plan  no    Affect   flat      Note: Pt denied SI and HI this am. He continues to state that he is depressed, anxious, and has AVH. He paces on the unit at times. He has a hx of becoming agitated with staff at times, although this nurse's interaction has been pleasant. He does not present with outward signs of anxiety other than the pacing. He states that there are no specific triggers for his anxiety and depression.       Intervention    PRN medication utilized:  no    Instructed in medication usage and effects  Medications administered as ordered  Encouraged to verbalize needs      Response    Verbalized understanding   Did patient take medications as ordered yes   Did patient interact with assessment?  yes     Plan    Will monitor for safety  Will monitor every 15 minutes as ordered  Will evaluate and promote the plan of care    Last BM:  April 3, 2019  (Please chart in I/O as well)

## 2019-04-04 NOTE — PROGRESS NOTES
"4/4/2019    Chief Complaint: psychosis and Severe Neurocognitive Dysfunction      Subjective:    Mr. Nick Austin \"Tenzin\" Lois is a 54 yr old male that is inpatient on the adult U for Psychosis and neuro dysfunction. He was admitted on 3/30/2019     Today Mr Abreu has been more quiet and kept to himself, states he is tired and can't get himself to rouse up and \"get with it\" today. He has been attending groups and taking his medications, just less conversational than he was yesterday. He has a very solemn affect today and continues to have poor eye contact.      Tenzin is concerned about living arrangements when discharged but states that he knows he has no control over that, he is going to let his daughter deal with it.      Tenzin takes his medications and reports that he feels that they are effective. He does not report and stomach upset or other adverse effects at this time.       Objective     Vital Signs    Temp:  [98.1 °F (36.7 °C)-99.4 °F (37.4 °C)] 98.1 °F (36.7 °C)  Heart Rate:  [71-95] 95  Resp:  [18] 18  BP: ()/(58-65) 97/64    Physical Exam:   General Appearance: fatigued and cooperative  Hygiene:   fair  Gait & Station: Normal  Musculoskeletal: No tremors or abnormal involuntary movements    Mental Status Exam:   Cooperation:  Evasive  Eye Contact:  Downcast  Psychomotor Behavior:  Slow  Mood: Apathetic  Affect:  flat  Speech:  Minimal  Thought Process:  Coherent  Associations: Circumstantial  Thought Content:     Normal   Suicidal:  None   Homicidal:  None   Hallucinations:  None   Delusion:  None  Cognitive Functioning:   Consciousness: awake and alert    Fund of Info: below average   Attent/Conc: impaired  Reliability:  fair  Insight:  Impaired and improving  Judgement:  Impaired  Impulse Control:  Impaired    Lab Results (last 24 hours)     ** No results found for the last 24 hours. **        Imaging Results (last 24 hours)     ** No results found for the last 24 hours. **          Medicine: "   Current Facility-Administered Medications:   •  acetaminophen (TYLENOL) tablet 650 mg, 650 mg, Oral, Q4H PRN, Casimiro Rodríguez II, MD  •  aluminum-magnesium hydroxide-simethicone (MAALOX MAX) 400-400-40 MG/5ML suspension 15 mL, 15 mL, Oral, Q6H PRN, Casimiro Rodríguez II, MD  •  aspirin chewable tablet 81 mg, 81 mg, Oral, Daily, Casimiro Rodríguez II, MD, 81 mg at 04/04/19 0825  •  atorvastatin (LIPITOR) tablet 20 mg, 20 mg, Oral, Nightly, Casimiro Rodríguez II, MD, 20 mg at 04/03/19 2018  •  clonazePAM (KlonoPIN) tablet 1.25 mg, 1.25 mg, Oral, BID, Casimiro Rodríguez II, MD, 1.25 mg at 04/04/19 0825  •  clopidogrel (PLAVIX) tablet 75 mg, 75 mg, Oral, Daily, Casimiro Rodríguez II, MD, 75 mg at 04/04/19 0825  •  donepezil (ARICEPT) tablet 10 mg, 10 mg, Oral, Nightly, Casimiro Rodríguez II, MD, 10 mg at 04/03/19 2018  •  DULoxetine (CYMBALTA) DR capsule 60 mg, 60 mg, Oral, Daily, Aide Sullivan APRN, 60 mg at 04/04/19 0825  •  hydrochlorothiazide (HYDRODIURIL) tablet 25 mg, 25 mg, Oral, Daily, Casimiro Rodríguez II, MD, 25 mg at 04/04/19 0825  •  hydrOXYzine pamoate (VISTARIL) capsule 50 mg, 50 mg, Oral, Q6H PRN, Casimiro Rodríguez II, MD, 50 mg at 03/31/19 2020  •  loperamide (IMODIUM) capsule 2 mg, 2 mg, Oral, 4x Daily PRN, Casimiro Rodríguez II, MD  •  magnesium hydroxide (MILK OF MAGNESIA) suspension 2400 mg/10mL 10 mL, 10 mL, Oral, Daily PRN, Casimiro Rodríguez II, MD  •  metoprolol succinate XL (TOPROL-XL) 24 hr tablet 50 mg, 50 mg, Oral, Daily, Casimiro Rodríguez II, MD, 50 mg at 04/04/19 0825  •  pantoprazole (PROTONIX) EC tablet 40 mg, 40 mg, Oral, Daily, Casimiro Rodríguez II, MD, 40 mg at 04/04/19 0825  •  QUEtiapine (SEROquel) tablet 150 mg, 150 mg, Oral, Nightly, Casimiro Rodríguez II, MD, 150 mg at 04/03/19 2018  •  terazosin (HYTRIN) capsule 2 mg, 2 mg, Oral, Nightly, Casimiro Rodríguez II, MD, 2 mg at 04/03/19 2018  •  traZODone (DESYREL) tablet 50  mg, 50 mg, Oral, Nightly PRN, Casimiro Rodríguez II, MD, 50 mg at 04/03/19 2018  •  triamcinolone (KENALOG) 0.1 % cream, , Topical, Q8H PRN, Casimiro Rodríguez II, MD, 1 application at 04/03/19 0843    Diagnoses/Assessment:     Psychosis (CMS/HCC)  --MDD, rec, sev  --Anxiety d/o  --Neurocognitive d/o, concern for psychosis 2/to it.  --Stimulant & Sedative Use D/os          Treatment Plan:    1) Will continue care for the patient on the behavioral health unit at [facility] to ensure patient safety.  2) Will continue to provide treatment with the unit milieu, activities, therapies and psychopharmacological management.  3) Patient to be placed on or continued on  Q15 minute checks  and Suicide and Aggression precautions.  4) Pertinent medical issues:   --Dyslipidemia: Continue Lipitor  -Heart health: Continue aspirin 81 mg daily and Plavix 75 mg daily  -Hypertension: Continue HCTZ 25 mg daily metoprolol XL 50 mg daily  -Skin care continue Kenalog cream every 8 hours  -GERD continue Protonix 40 mg daily  5) Will order following labs: none  6) Will make the following medication changes:   --Continue terazosin 2 mg at bedtime  -Cont Cymbalta  60 mg daily  -Cont Quetiapine to 150mg QHS  --Decrease  Klonopin to 1 mg twice a day given concern for benzodiazepine use at home, prescribed Xanax 2 mg 3 times a day, monitor for need as well as autonomic to make need to consider increase and slow taper given dose at home  --Cont Aricept 10 mg nightly for neurocognitive disorder  -Add melatonin 3 mg for circadian entrainment  7) Will continue discharge planning as appropriate for patient.  8) Psychotherapy provided for less than 16 minutes.    Treatment plan and medication risks and benefits discussed with: Patient    Aide MORRO Vazquez  04/04/19  2:54 PM    I saw and examined the patient, as well as being present during the key portions of the E/M service.      I agree with the history as documented by NP  Laurie:   --Improving, less anxious today.  Some difficulty with maintenance insomnia last night    I agree with the above exam: Concur with the above    I agree with assessment and plan as outlined above:    --Medications as above  -Supportive psychotherapy provided for 11 minutes to target affective and his cognitive symptoms      Casimiro Rodríguez II, MD  Psychiatry & Behavioral Sciences  04/04/19  @ 3:14 PM

## 2019-04-05 PROCEDURE — 99232 SBSQ HOSP IP/OBS MODERATE 35: CPT | Performed by: NURSE PRACTITIONER

## 2019-04-05 RX ADMIN — HYDROCHLOROTHIAZIDE 25 MG: 25 TABLET ORAL at 08:23

## 2019-04-05 RX ADMIN — PANTOPRAZOLE SODIUM 40 MG: 40 TABLET, DELAYED RELEASE ORAL at 08:23

## 2019-04-05 RX ADMIN — TERAZOSIN HYDROCHLORIDE 2 MG: 2 CAPSULE ORAL at 20:32

## 2019-04-05 RX ADMIN — CLONAZEPAM 1 MG: 0.5 TABLET ORAL at 08:24

## 2019-04-05 RX ADMIN — CLOPIDOGREL BISULFATE 75 MG: 75 TABLET ORAL at 08:24

## 2019-04-05 RX ADMIN — METOPROLOL SUCCINATE 50 MG: 50 TABLET, EXTENDED RELEASE ORAL at 08:23

## 2019-04-05 RX ADMIN — ASPIRIN 81 MG CHEWABLE TABLET 81 MG: 81 TABLET CHEWABLE at 08:23

## 2019-04-05 RX ADMIN — CLONAZEPAM 1 MG: 0.5 TABLET ORAL at 20:32

## 2019-04-05 RX ADMIN — TRAZODONE HYDROCHLORIDE 50 MG: 50 TABLET ORAL at 20:33

## 2019-04-05 RX ADMIN — DONEPEZIL HYDROCHLORIDE 10 MG: 10 TABLET ORAL at 20:33

## 2019-04-05 RX ADMIN — MELATONIN 3 MG: at 20:34

## 2019-04-05 RX ADMIN — DULOXETINE 60 MG: 60 CAPSULE, DELAYED RELEASE ORAL at 08:23

## 2019-04-05 RX ADMIN — QUETIAPINE 150 MG: 100 TABLET ORAL at 20:32

## 2019-04-05 RX ADMIN — ATORVASTATIN CALCIUM 20 MG: 20 TABLET, FILM COATED ORAL at 20:32

## 2019-04-05 NOTE — DISCHARGE PLACEMENT REQUEST
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   •  acetaminophen (TYLENOL) tablet 650 mg, 650 mg, Oral, Q4H PRN, Casimiro Rodríguez II, MD  •  aluminum-magnesium hydroxide-simethicone (MAALOX MAX) 400-400-40 MG/5ML suspension 15 mL, 15 mL, Oral, Q6H PRN, Casimiro Rodríguez II, MD  •  aspirin chewable tablet 81 mg, 81 mg, Oral, Daily, Casimiro Rodríguez II, MD, 81 mg at 04/04/19 0825  •  atorvastatin (LIPITOR) tablet 20 mg, 20 mg, Oral, Nightly, Casimiro Rodríguez II, MD, 20 mg at 04/03/19 2018  •  clonazePAM (KlonoPIN) tablet 1.25 mg, 1.25 mg, Oral, BID, Casimiro Rodríguez II, MD, 1.25 mg at 04/04/19 0825  •  clopidogrel (PLAVIX) tablet 75 mg, 75 mg, Oral, Daily, Casimiro Rodríguez II, MD, 75 mg at 04/04/19 0825  •  donepezil (ARICEPT) tablet 10 mg, 10 mg, Oral, Nightly, Casimiro Rodríguez II, MD, 10 mg at 04/03/19 2018  •  DULoxetine (CYMBALTA) DR capsule 60 mg, 60 mg, Oral, Daily, Aide Sullivan APRN, 60 mg at 04/04/19 0825  •  hydrochlorothiazide (HYDRODIURIL) tablet 25 mg, 25 mg, Oral, Daily, Casimiro Rodríguez II, MD, 25 mg at 04/04/19 0825  •  hydrOXYzine pamoate (VISTARIL) capsule 50 mg, 50 mg, Oral, Q6H PRN, Casimiro Rodríguez II, MD, 50 mg at 03/31/19 2020  •  loperamide (IMODIUM) capsule 2 mg,  2 mg, Oral, 4x Daily PRN, Casimiro Rodríguez II, MD  •  magnesium hydroxide (MILK OF MAGNESIA) suspension 2400 mg/10mL 10 mL, 10 mL, Oral, Daily PRN, Casimiro Rodríguez II, MD  •  metoprolol succinate XL (TOPROL-XL) 24 hr tablet 50 mg, 50 mg, Oral, Daily, Casimiro Rodríguez II, MD, 50 mg at 04/04/19 0825  •  pantoprazole (PROTONIX) EC tablet 40 mg, 40 mg, Oral, Daily, Casimiro Rodríguez II, MD, 40 mg at 04/04/19 0825  •  QUEtiapine (SEROquel) tablet 150 mg, 150 mg, Oral, Nightly, Casimiro Rodríguez II, MD, 150 mg at 04/03/19 2018  •  terazosin (HYTRIN) capsule 2 mg, 2 mg, Oral, Nightly, Casimiro Rodríguez II, MD, 2 mg at 04/03/19 2018  •  traZODone (DESYREL) tablet 50 mg, 50 mg, Oral, Nightly PRN, Casimiro Rodríguez II, MD, 50 mg at 04/03/19 2018  •  triamcinolone (KENALOG) 0.1 % cream, , Topical, Q8H PRN, Casimiro Rodríguez II, MD, 1 application at 04/03/19 0843    Diagnoses/Assessment:     Psychosis (CMS/HCC)  --MDD, rec, sev  --Anxiety d/o  --Neurocognitive d/o, concern for psychosis 2/to it.  --Stimulant & Sedative Use D/os          Treatment Plan:    1) Will continue care for the patient on the behavioral health unit at [facility] to ensure patient safety.  2) Will continue to provide treatment with the unit milieu, activities, therapies and psychopharmacological management.  3) Patient to be placed on or continued on  Q15 minute checks  and Suicide and Aggression precautions.  4) Pertinent medical issues:   --Dyslipidemia: Continue Lipitor  -Heart health: Continue aspirin 81 mg daily and Plavix 75 mg daily  -Hypertension: Continue HCTZ 25 mg daily metoprolol XL 50 mg daily  -Skin care continue Kenalog cream every 8 hours  -GERD continue Protonix 40 mg daily  5) Will order following labs: none  6) Will make the following medication changes:   --Continue terazosin 2 mg at bedtime  -Cont Cymbalta  60 mg daily  -Cont Quetiapine to 150mg QHS  --Decrease  Klonopin to 1 mg  "twice a day given concern for benzodiazepine use at home, prescribed Xanax 2 mg 3 times a day, monitor for need as well as autonomic to make need to consider increase and slow taper given dose at home  --Cont Aricept 10 mg nightly for neurocognitive disorder  -Add melatonin 3 mg for circadian entrainment  7) Will continue discharge planning as appropriate for patient.  8) Psychotherapy provided for less than 16 minutes.    Treatment plan and medication risks and benefits discussed with: Patient    Aide Sullivan, MORRO  04/04/19  2:54 PM    I saw and examined the patient, as well as being present during the key portions of the E/M service.      I agree with the history as documented by TIFF Sullivan:   --Improving, less anxious today.  Some difficulty with maintenance insomnia last night    I agree with the above exam: Concur with the above    I agree with assessment and plan as outlined above:    --Medications as above  -Supportive psychotherapy provided for 11 minutes to target affective and his cognitive symptoms      Casimiro Rodríguez II, MD  Psychiatry & Behavioral Sciences  04/04/19  @ 3:14 PM            Electronically signed by Casimiro Rodríguez II, MD at 4/4/2019  3:14 PM     Casimiro Rodríguez II, MD at 4/3/2019  3:15 PM          4/3/2019    Chief Complaint: psychosis and Severe Neurocognitive Dysfunction    Subjective:     Mr. Nick Austin \"Tenzin\" Lois is a 54 yr old male that is inpatient on the adult U for Psychosis and neuro dysfunction. He was admitted on 3/30/2019     Tenzin is engaged during interview, he has been up and attending groups with peers. He states that he is resting in his room at night but not getting a solid sleep, that he wakes up many times during the night.  He reports no SI/HI but states that he feels like he just needs to get somewhere so that he can just sit the rest of his days away. He continues to have a downward look and does not make eye contact during the conversation.  "   He states that his daughter is supposed to be helping him find a place to live after discharge.    Tenzin is very flat and does not offer much conversation, he does present fairly engaged at this time compared to previous times.         Objective     Vital Signs    Temp:  [98.3 °F (36.8 °C)-98.7 °F (37.1 °C)] 98.3 °F (36.8 °C)  Heart Rate:  [] 102  Resp:  [18] 18  BP: (104-113)/(66-67) 104/67    Physical Exam:   General Appearance: alert, appears stated age and cooperative,  Hygiene:   fair  Gait & Station: Normal  Musculoskeletal: No tremors or abnormal involuntary movements    Mental Status Exam:   Cooperation:  Cooperative  Eye Contact:  Downcast  Psychomotor Behavior:  Slow  Mood: Sad/Depressed  Affect:  mood-congruent  Speech:  Monotone  Thought Process:  Poverty of thought and [place]  Associations: Goal Directed  Thought Content:     Mood congurent   Suicidal:  None   Homicidal:  None   Hallucinations:  States that he sees his mother in the room with him early in the morning, this is not a scary hallucination he states it is more comforting.    Delusion:  None  Cognitive Functioning:   Consciousness: awake and alert  Reliability:  impaired  Insight:  Poor  Judgement:  Impaired  Impulse Control:  Impaired    Lab Results (last 24 hours)     ** No results found for the last 24 hours. **        Imaging Results (last 24 hours)     ** No results found for the last 24 hours. **          Medicine:   Current Facility-Administered Medications:   •  acetaminophen (TYLENOL) tablet 650 mg, 650 mg, Oral, Q4H PRN, Casimiro Rodríguez II, MD  •  aluminum-magnesium hydroxide-simethicone (MAALOX MAX) 400-400-40 MG/5ML suspension 15 mL, 15 mL, Oral, Q6H PRN, Casimiro Rodríguez II, MD  •  aspirin chewable tablet 81 mg, 81 mg, Oral, Daily, Casimiro Rodríguez II, MD, 81 mg at 04/03/19 0848  •  atorvastatin (LIPITOR) tablet 20 mg, 20 mg, Oral, Nightly, Casimiro Rodríguez II, MD, 20 mg at 04/02/19 2053  •   clonazePAM (KlonoPIN) tablet 1.5 mg, 1.5 mg, Oral, BID, Casimiro Rodríguez II, MD, 1.5 mg at 04/03/19 0843  •  clopidogrel (PLAVIX) tablet 75 mg, 75 mg, Oral, Daily, Casimiro Rodríguez II, MD, 75 mg at 04/03/19 0847  •  donepezil (ARICEPT) tablet 10 mg, 10 mg, Oral, Nightly, Casimiro Rodríguez II, MD, 10 mg at 04/02/19 2053  •  DULoxetine (CYMBALTA) DR capsule 40 mg, 40 mg, Oral, Daily, Casimiro Rodríguez II, MD, 40 mg at 04/03/19 0845  •  hydrochlorothiazide (HYDRODIURIL) tablet 25 mg, 25 mg, Oral, Daily, Casimiro Rodríguez II, MD, 25 mg at 04/03/19 0846  •  hydrOXYzine pamoate (VISTARIL) capsule 50 mg, 50 mg, Oral, Q6H PRN, Casimiro Rodríguez II, MD, 50 mg at 03/31/19 2020  •  loperamide (IMODIUM) capsule 2 mg, 2 mg, Oral, 4x Daily PRN, Casimiro Rodríguez II, MD  •  magnesium hydroxide (MILK OF MAGNESIA) suspension 2400 mg/10mL 10 mL, 10 mL, Oral, Daily PRN, Casimiro Rodríguez II, MD  •  metoprolol succinate XL (TOPROL-XL) 24 hr tablet 50 mg, 50 mg, Oral, Daily, Casimiro Rodríguez II, MD, 50 mg at 04/03/19 0846  •  pantoprazole (PROTONIX) EC tablet 40 mg, 40 mg, Oral, Daily, Casimiro Rodríguez II, MD, 40 mg at 04/03/19 0847  •  propranolol (INDERAL) tablet 20 mg, 20 mg, Oral, Once, Casimiro Rodríguez II, MD  •  QUEtiapine (SEROquel) tablet 150 mg, 150 mg, Oral, Nightly, Casimiro Rodríguez II, MD, 150 mg at 04/02/19 2053  •  terazosin (HYTRIN) capsule 2 mg, 2 mg, Oral, Nightly, Casimiro Rodríguez II, MD, 2 mg at 04/02/19 2053  •  traZODone (DESYREL) tablet 50 mg, 50 mg, Oral, Nightly PRN, Casimiro Rodríguez II, MD, 50 mg at 04/02/19 2053  •  triamcinolone (KENALOG) 0.1 % cream, , Topical, Q8H PRN, Casimiro Rodríguez II, MD, 1 application at 04/03/19 0843    Diagnoses/Assessment:     Psychosis (CMS/[facility])      Treatment Plan:    1) Will continue care for the patient on the behavioral health unit at [facility] to ensure patient safety.  2) Will continue to  provide treatment with the unit milieu, activities, therapies and psychopharmacological management.  3) Patient to be placed on or continued on  Q15 minute checks  and Suicide precautions.  4) Pertinent medical issues:   --Dyslipidemia: Continue Lipitor  -Heart health: Continue aspirin 81 mg daily and Plavix 75 mg daily  -Hypertension: Continue HCTZ 25 mg daily metoprolol XL 50 mg daily  -Skin care continue Kenalog cream every 8 hours  -GERD continue Protonix 40 mg daily  5) Will order following labs: None  6) Will make the following medication changes:   --Continue terazosin 2 mg at bedtime  -increase Cymbalta   60 mg daily  -Quetiapine to 150mg QHS  --Decrease  Klonopin  to 1.25 mg twice a day given concern for benzodiazepine use at home, prescribed Xanax 2 mg 3 times a day, monitor for need as well as autonomic to make need to consider increase and slow taper given dose at home  --one time dose of propanolol 20mg for increase nervousness    7) Will continue discharge planning as appropriate for patient.  8) Psychotherapy provided for 16-37 minutes.  Provided CBT and supportive therapy.    Treatment plan and medication risks and benefits discussed with: Patient    Aide Sullivan, MORRO  04/03/19  3:15 PM    I saw and examined the patient, as well as being present during the key portions of the E/M service.      I agree with the history as documented by TIFF Sullivan:   --Dysphoric, very poor recall situation    I agree with the above exam: concur with exam as above    I agree with assessment and plan as outlined above:    --Medication changes as above, 17 minutes of therapy provided by myself for cognitive concerns, depression, maladaptive coping skills      Casimiro Rodríguez II, MD  Psychiatry & Behavioral Sciences  04/03/19  @ 9:45 PM            Electronically signed by Casimiro Rodríguez II, MD at 4/3/2019  9:46 PM     Casimiro Rodríguez II, MD at 4/2/2019  1:12 PM          Psychiatry Progress Note   4/2/2019       HD: #3    Chief Complaint: Gross Psychosis & Severe Neurocognitive Dysfunction      Subjective --  Mr. Nick Abreu is a 54 y.o. male who was seen on the Adult unit.      Patient is more pleasant and better engaged today.  He reports sleeping well last night.  Anxiety is improving today.  Mood is improving as well.  He denies any SI or HI, intent or plan.  Denies any AVH or paranoia.  He continues to be profoundly dyscognitive, but is better able to stay linear today.  We discussed his lack of family support, but he was optimistic about his daughter wanting to become involved.  We reviewed the results of his slums testing and the neurocognitive concerns.        Objective   Objective --      Vital Signs:  Temp:  [98 °F (36.7 °C)-98.9 °F (37.2 °C)] 98 °F (36.7 °C)  Heart Rate:  [76-89] 89  Resp:  [18] 18  BP: ()/(56-82) 98/56    Physical Exam:   -General Appearance:  alert and cooperative  -Hygiene:  Limited   -Gait & Station:  Blank multiple: Normal  -Musculoskeletal:  No tremors or abnormal involuntary movements and No atrophy noted no cogwheeling or rigidity.  -HEENT: No Nystagmus or ophthalmoplegia.    -Pulm: unlaboured      Mental Status Exam:   --Cooperation:  Cooperative  --Eye Contact:  Downcast  --Psychomotor Behavior:  Slow  --Mood:  Sad/Depressed  --Affect:  mood-congruent; confused  --Speech:  Slow, Soft, more coherent and Word finding problems  --Thought Process:  [place], Sluggish and Disorganized  --Associations: Goal Directed and Less Circumstantial  --Themes:  Failure , Confusion  --Thought Content:                --Mood congurent              --Suicidal:  Denies               --Homicidal:  Denies              --Hallucinations:    Denies              --Delusion:    None overt  --Cognitive Functioning:              -Consciousness: awake, alert and confused              -Orientation:  Person and Place              -Attention:  Distractible                         -Concentration:   Distractible              -Short Term Memory: Deficits              -Long Term Memory: Deficits              -Fund of Knowledge:  Below Average based on interaction  --Reliability:  impaired  --Insight:  Poor  --Judgment:  Impaired  --Impulse Control:  Impaired     --> SLUMS 1 April 2019: 12/30      Lab Results (last 24 hours)     Procedure Component Value Units Date/Time    RPR [[id number]]  (Normal) Collected:  04/01/19 0522    Specimen:  Blood Updated:  04/02/19 0835     RPR Non-Reactive      --reviewed    Imaging Results (last 24 hours)     ** No results found for the last 24 hours. **            Medications:   Scheduled Meds:    aspirin 81 mg Oral Daily   atorvastatin 20 mg Oral Nightly   clonazePAM 1.5 mg Oral BID   clopidogrel 75 mg Oral Daily   donepezil 10 mg Oral Nightly   DULoxetine 40 mg Oral Daily   hydrochlorothiazide 25 mg Oral Daily   metoprolol succinate XL 50 mg Oral Daily   pantoprazole 40 mg Oral Daily   QUEtiapine 100 mg Oral Nightly   terazosin 2 mg Oral Nightly     Continuous Infusions:   PRN Meds:.•  acetaminophen  •  aluminum-magnesium hydroxide-simethicone  •  hydrOXYzine pamoate  •  loperamide  •  magnesium hydroxide  •  traZODone  •  triamcinolone      Assessment:     Psychosis (CMS/HCC)  --MDD, rec, sev  --Anxiety d/o  --Neurocognitive d/o, concern for psychosis 2/to it.  --Stimulant & Sedative Use D/os       Treatment Plan:  1) Will continue care for the patient on the behavioral health unit at [facility] to ensure patient safety given Poor impulse control pose unexceptablly high risk for harm if currently discharged, Absence of any protective factors currently placing patient at an unexceptably elevated risk of harm, Cognitive impairment posing risk of safety to self and others currently without safe discharge and Profound Functional impairment from his NCD causing need for further monitoring and medication management, with currently unexceptable level of risk for  discharge.    2) Will continue to provide treatment with the unit milieu, activities, therapies and psychopharmacological management.    3) Patient to be placed on or continued on  Q15 minute checks  and Suicide precautions.    4) Pertinent Concurrent Non-Psychiatric Medical Issues:   --Dyslipidemia: Continue Lipitor  -Heart health: Continue aspirin 81 mg daily and Plavix 75 mg daily  -Hypertension: Continue HCTZ 25 mg daily metoprolol XL 50 mg daily  -Skin care continue Kenalog cream every 8 hours  -GERD continue Protonix 40 mg daily      5) Will order following labs: None    6) Behavioral Health Treatment Planning:  --Continue terazosin 2 mg at bedtime  -Continue Cymbalta  40 mg daily  -Quetiapine to 150mg QHS  -Cont Klonopin 1.5 mg twice a day given concern for benzodiazepine use at home, prescribed Xanax 2 mg 3 times a day, monitor for need as well as autonomic to make need to consider increase and slow taper given dose at home    7) Psychotherapy provided: Supportive, CBT/cognitive and Psycho-Educational for 22 minutes to target Affective Symptoms, Cognitive Dysfunction and To Build and Strengthen Rapport.     8) APS involvment; see ELINA Segura's note today for more information.     --> Dispostion Planning: To be decided, will need to move forward with a safe disposition planning, but difficult given no family engagement.  Continue to coordinate with treatment plan treatment team    Treatment plan and medication risks and benefits discussed with: Patient and Treatment Team.    Casimiro Rodríguez II, MD  04/02/19 @ 1:12 PM  Dictated using Dragon.    Electronically signed by Casimiro Rodríguez II, MD at 4/2/2019  1:16 PM     Casimiro Rodríguez II, MD at 4/1/2019  3:59 PM          Psychiatry Progress Note   4/1/2019      HD: #2    Chief Complaint: Gross Psychosis      Subjective --  Mr. Nick Abreu is a 54 y.o. male who was seen on the Adult unit.      Pleasantly confused today.  Pt seen in tx team.   Becomes irritable when asked about +AMP on UDS.  States he has no recall.  He cannot provide details of his situation.  Easily angered.  Denies any SI/HI.  His concentration and attention, as well as working memory, are exceptionally poor.  At times, is confused on unit.    SLUMS today is 12/30.      Review of outpt pharm records show Primidone (likely responsible for Barbs screen on UDS); high dose Xanax 2mg TID; no stimulants.      [facility] shows Rx for Xanax as above, Ambien Rx back in Jan.          Objective   Objective --      Vital Signs:  Temp:  [98.5 °F (36.9 °C)-98.6 °F (37 °C)] 98.5 °F (36.9 °C)  Heart Rate:  [71-74] 74  Resp:  [16-18] 18  BP: ()/(61-68) 99/61    Physical Exam:   -General Appearance:  alert and cooperative  -Hygiene:  Limited   -Gait & Station:  Blank multiple: Normal  -Musculoskeletal:  No tremors or abnormal involuntary movements and No atrophy noted no cogwheeling or rigidity.  -HEENT: No Nystagmus or ophthalmoplegia.    -Pulm: unlaboured      Mental Status Exam:   --Cooperation:  Cooperative  --Eye Contact:  Downcast  --Psychomotor Behavior:  Slow  --Mood:  Sad/Depressed  --Affect:  mood-congruent ; confused  --Speech:  Slow, Soft, Variable volume and Word finding problems  --Thought Process:  [place], Sluggish and Disorganized  --Associations: Goal Directed and Circumstantial  --Themes:  Failure  --Thought Content:                --Mood congurent              --Suicidal:  Denies               --Homicidal:  Denies              --Hallucinations:  Visual              --Delusion:  Unable to demonstrate  --Cognitive Functioning:              -Consciousness: awake, alert and confused              -Orientation:  Person and Place              -Attention:  Distractible                         -Concentration:  Distractible              -Short Term Memory: Deficits              -Long Term Memory: Deficits              -Fund of Knowledge:  Below Average based on interaction  --Reliability:   impaired  --Insight:  Poor  --Judgment:  Impaired  --Impulse Control:  Impaired     --> SLUMS 1 April 2019: 12/30      Lab Results (last 24 hours)     Procedure Component Value Units Date/Time    Folate [[id number]]  (Normal) Collected:  04/01/19 0522    Specimen:  Blood Updated:  04/01/19 1237     Folate 6.45 ng/mL     Vitamin B12 [[id number]]  (Normal) Collected:  04/01/19 0522    Specimen:  Blood Updated:  04/01/19 1237     Vitamin B-12 398 pg/mL     Vitamin D 25 Hydroxy [[id number]]  (Abnormal) Collected:  04/01/19 0522    Specimen:  Blood Updated:  04/01/19 1237     25 Hydroxy, Vitamin D 17.8 ng/ml     Narrative:       Reference Range for Total Vitamin D 25(OH)     Deficiency <20.0 ng/mL   Insufficiency 21-29 ng/mL   Sufficiency  ng/mL  Toxicity >100 ng/ml    HIV-1 & HIV-2 Antibodies [[id number]] Collected:  04/01/19 0522    Specimen:  Blood Updated:  04/01/19 0938    Narrative:       The following orders were created for panel order HIV-1 & HIV-2 Antibodies.  Procedure                               Abnormality         Status                     ---------                               -----------         ------                     HIV-1 / O / 2 Ag / Antib...[[id number]]  Normal              Final result                 Please view results for these tests on the individual orders.    HIV-1 / O / 2 Ag / Antibody 4th Generation [[id number]]  (Normal) Collected:  04/01/19 0522    Specimen:  Blood Updated:  04/01/19 0938     HIV-1/ HIV-2 Non-Reactive    Narrative:       The HIV antibody/antigen combo assay is a qualitative assay for HIV that includes the p24 antigen as well as antibodies to HIV types 1 and 2. This test is intended to be used as a screening assay in the diagnosis of HIV infection in patients over the age of 2.    Comprehensive Metabolic Panel [[id number]]  (Abnormal) Collected:  04/01/19 0522    Specimen:  Blood Updated:  04/01/19 0626     Glucose 116 mg/dL      BUN 8 mg/dL      Creatinine 0.83 mg/dL       Sodium 141 mmol/L      Potassium 3.4 mmol/L      Chloride 101 mmol/L      CO2 26.0 mmol/L      Calcium 10.3 mg/dL      Total Protein 6.8 g/dL      Albumin 4.10 g/dL      ALT (SGPT) 24 U/L      AST (SGOT) 17 U/L      Alkaline Phosphatase 113 U/L      Total Bilirubin 0.3 mg/dL      eGFR Non African Amer 97 mL/min/1.73      Globulin 2.7 gm/dL      A/G Ratio 1.5 g/dL      BUN/Creatinine Ratio 9.6     Anion Gap 14.0 mmol/L     Narrative:       GFR Normal >60  Chronic Kidney Disease <60  Kidney Failure <15    RPR [[id number]] Collected:  04/01/19 0522    Specimen:  Blood Updated:  04/01/19 0539      --reviewed    Imaging Results (last 24 hours)     ** No results found for the last 24 hours. **            Medications:   Scheduled Meds:  aspirin 81 mg Oral Daily   atorvastatin 20 mg Oral Nightly   clonazePAM 1.5 mg Oral BID   clopidogrel 75 mg Oral Daily   donepezil 10 mg Oral Nightly   [START ON 4/2/2019] DULoxetine 40 mg Oral Daily   hydrochlorothiazide 25 mg Oral Daily   metoprolol succinate XL 50 mg Oral Daily   pantoprazole 40 mg Oral Daily   QUEtiapine 100 mg Oral Nightly   terazosin 2 mg Oral Nightly   triamcinolone  Topical Q8H     Continuous Infusions:   PRN Meds:.•  acetaminophen  •  aluminum-magnesium hydroxide-simethicone  •  hydrOXYzine pamoate  •  loperamide  •  magnesium hydroxide  •  traZODone      Assessment:     Psychosis (CMS/HCC)  --MDD, rec, sev  --Anxiety d/o  --Neurocognitive d/o, concern for psychosis 2/to it.  --Stimulant & Sedative Use D/os       Treatment Plan:  1) Will continue care for the patient on the behavioral health unit at [facility] to ensure patient safety given Poor impulse control pose unexceptablly high risk for harm if currently discharged, Absence of any protective factors currently placing patient at an unexceptably elevated risk of harm, Cognitive impairment posing risk of safety to self and others currently without safe discharge and Profound Functional  impairment from his NCD causing need for further monitoring and medication management, with currently unexceptable level of risk for discharge.    2) Will continue to provide treatment with the unit milieu, activities, therapies and psychopharmacological management.    3) Patient to be placed on or continued on  Q15 minute checks  and Suicide precautions.    4) Pertinent Concurrent Non-Psychiatric Medical Issues:   --Dyslipidemia: Continue Lipitor  -Heart health: Continue aspirin 81 mg daily and Plavix 75 mg daily  -Hypertension: Continue HCTZ 25 mg daily metoprolol XL 50 mg daily  -Skin care continue Kenalog cream every 8 hours  -GERD continue Protonix 40 mg daily      5) Will order following labs: None    6) Behavioral Health Treatment Planning:  --Continue terazosin 2 mg at bedtime  -Continue Cymbalta decreased to 40 mg given it appears patient was not taking as an outpatient so was felt worse and activation irritability  -Quetiapine continue to 100 mg started last night  -Add Klonopin 1.5 mg twice a day given concern for benzodiazepine use at home, prescribed Xanax 2 mg 3 times a day, monitor for need as well as autonomic to make need to consider increase and slow taper given dose at home    7) Psychotherapy provided: Supportive, CBT/cognitive and Psycho-Educational for 19 minutes to target Affective Symptoms, Cognitive Dysfunction and To Build and Strengthen Rapport.     --> Dispostion Planning: To be decided, will need to move forward with a safe disposition planning, but difficult given no family engagement.  Continue to coordinate with treatment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

## 2019-04-05 NOTE — PROGRESS NOTES
LCSW followed up with pt's daughter and pt to discuss disposition. Both pt and daughter agreed to referral being sent to [facility] in [place]. LCSW faxed referral and spoke with Erik at [facility]. Erik sta demian that pt seems appropriate for admission but she would like to meet with him first. Plan is to do an onsite eval on Tuesday, 4/9/19 with possible dc at that time if accepted. LCSW to follow up accordingly.

## 2019-04-05 NOTE — PLAN OF CARE
Problem: Fall Risk (Adult)  Goal: Absence of Fall  Outcome: Ongoing (interventions implemented as appropriate)      Problem: Social/Functional Impairment (Dementia Signs/Symptoms) (Adult)  Goal: Improved Social/Functional Skills/Ability (Dementia Signs/Symptoms)  Outcome: Ongoing (interventions implemented as appropriate)      Problem: Suicidal Behavior (Adult)  Goal: Suicidal Behavior is Absent/Minimized/Managed  Outcome: Ongoing (interventions implemented as appropriate)

## 2019-04-05 NOTE — PLAN OF CARE
Problem: Patient Care Overview  Goal: Plan of Care Review  Outcome: Ongoing (interventions implemented as appropriate)   04/04/19 2055   Coping/Psychosocial   Plan of Care Reviewed With patient   Coping/Psychosocial   Patient Agreement with Plan of Care agrees   Plan of Care Review   Progress no change     Goal: Individualization and Mutuality  Outcome: Ongoing (interventions implemented as appropriate)    Goal: Discharge Needs Assessment  Outcome: Ongoing (interventions implemented as appropriate)    Goal: Interprofessional Rounds/Family Conf  Outcome: Ongoing (interventions implemented as appropriate)      Problem: Overarching Goals (Adult)  Goal: Adheres to Safety Considerations for Self and Others  Outcome: Ongoing (interventions implemented as appropriate)   04/04/19 2055   Overarching Goals (Adult)   Adheres to Safety Considerations for Self and Others making progress toward outcome     Goal: Optimized Coping Skills in Response to Life Stressors  Outcome: Ongoing (interventions implemented as appropriate)   04/04/19 2055   Overarching Goals (Adult)   Optimized Coping Skills in Response to Life Stressors making progress toward outcome     Goal: Develops/Participates in Therapeutic [place] to Support Successful Transition  Outcome: Ongoing (interventions implemented as appropriate)   04/04/19 2055   Overarching Goals (Adult)   Develops/Participates in Therapeutic [place] to Support Successful Transition making progress toward outcome       Problem: Fall Risk (Adult)  Goal: Absence of Fall  Outcome: Ongoing (interventions implemented as appropriate)   04/04/19 2055   Fall Risk (Adult)   Absence of Fall making progress toward outcome       Problem: Cognitive Impairment (Dementia Signs/Symptoms) (Adult)  Goal: Optimized Cognitive Function (Dementia Signs/Symptoms)  Outcome: Ongoing (interventions implemented as appropriate)   04/04/19 2055   Optimized Cognitive Function (Dementia Signs/Symptoms)   Optimized  Cognitive Ability Action Step (STG) Outcome making progress toward outcome       Problem: Psychological Impairment (Dementia Signs/Symptoms) (Adult)  Goal: Improved Psychological Symptoms (Dementia Signs/Symptoms)  Outcome: Ongoing (interventions implemented as appropriate)   04/04/19 2055   Improved Psychological Symptoms (Dementia Signs/Symptoms)   Improved Psychological Symptoms Action Step (STG) Outcome making progress toward outcome       Problem: Social/Functional Impairment (Dementia Signs/Symptoms) (Adult)  Goal: Improved Social/Functional Skills/Ability (Dementia Signs/Symptoms)  Outcome: Ongoing (interventions implemented as appropriate)   04/04/19 2055   Improved Social/Functional Skills/Ability (Dementia Signs/Symptoms)   Improved Social/Functional Skills Action Step (STG) Outcome making progress toward outcome       Problem: Suicidal Behavior (Adult)  Goal: Suicidal Behavior is Absent/Minimized/Managed  Outcome: Ongoing (interventions implemented as appropriate)   04/04/19 2055   Suicidal Behavior is Absent/Minimized/Managed   Suicidal Behavior Managed/Minimized Action Step (STG) Outcome making progress toward outcome

## 2019-04-05 NOTE — PLAN OF CARE
Problem: Patient Care Overview  Goal: Plan of Care Review  Outcome: Ongoing (interventions implemented as appropriate)  Will maintain pt on prescribed diet.   04/05/19 0196   Coping/Psychosocial   Plan of Care Reviewed With patient   Plan of Care Review   Progress no change   OTHER   Outcome Summary initial assessment

## 2019-04-05 NOTE — CONSULTS
Adult Nutrition  Assessment    Patient Name:  Nick Abreu  YOB: 1965  MRN: [id number]  Admit Date:  3/30/2019    Assessment Date:  4/5/2019    Comments:  Pt reports pretty good appetite.  Reports possible wt gain since admit but unsure of amount.  Voiced no food preferences.  Intake 1005 - 7x plus 100% for 1 snack.  Meds and labs reviewed.  Pt seen per Length of Stay.  Will maintain pt on prescribed diet.    Reason for Assessment     Row Name 04/05/19 1328          Reason for Assessment    Reason For Assessment  per organizational policy Length of Stay             Labs/Tests/Procedures/Meds     Row Name 04/05/19 1328          Labs/Procedures/Meds    Lab Results Reviewed  reviewed        Medications    Pertinent Medications Reviewed  reviewed           Estimated/Assessed Needs     Row Name 04/05/19 1328          Calculation Measurements    Weight Used For Calculations  93.4 kg (205 lb 14.6 oz)        Estimated/Assessed Needs    Additional Documentation  Calorie Requirements (Group);Fluid Requirements (Group);[place]-St. Jeor Equation (Group);Protein Requirements (Group)        Calorie Requirements    Estimated Calorie Requirement (kcal/day)  2355     Estimated Calorie Need Method  [place]-St Jeor        KCAL/KG    14 Kcal/Kg (kcal)  1307.6     15 Kcal/Kg (kcal)  1401     18 Kcal/Kg (kcal)  1681.2     20 Kcal/Kg (kcal)  1868     25 Kcal/Kg (kcal)  2335     30 Kcal/Kg (kcal)  2802     35 Kcal/Kg (kcal)  3269     40 Kcal/Kg (kcal)  3736     45 Kcal/Kg (kcal)  4203     50 Kcal/Kg (kcal)  4670        [place]-St. Jeor Equation    RMR ([place]-St. Jeor Equation)  1812.13        Protein Requirements    Est Protein Requirement Amount (gms/kg)  1.0 gm protein     Estimated Protein Requirements (gms/day)  93.4        Fluid Requirements    Estimated Fluid Requirements (mL/day)  2802     RDA Method (mL)  2802     [place]-Segar Method (over 20 kg)  3368         Nutrition Prescription Ordered     Row Name  04/05/19 1330          Nutrition Prescription PO    Current PO Diet  Regular         Evaluation of Received Nutrient/Fluid Intake     Row Name 04/05/19 1330 04/05/19 1328       Calculation Measurements    Weight Used For Calculations  --  93.4 kg (205 lb 14.6 oz)       PO Evaluation    Number of Meals  7 plua 1 snack  --    % PO Intake  100% - 7x plus 100% for 1 snack  --        Evaluation of Prescribed Nutrient/Fluid Intake     Row Name 04/05/19 1328          Calculation Measurements    Weight Used For Calculations  93.4 kg (205 lb 14.6 oz)             Electronically signed by:  Graciela Brown RD  04/05/19 1:31 PM

## 2019-04-05 NOTE — NURSING NOTE
Behavior   Note any precipitants to event or behavior   Describe level and action of any aggressive behavior or speech and associated interventions.     Anxiety: Patient denies at this time  Depression: Patient denies at this time  Pain  0  AVH   no  S/I   no  Plan  no  H/I   no  Plan  no    Affect   flat      Note:Pt is alert, oriented x3 verbal and ambulatory. Appropriate interaction with staff and peers. Reports not sleeping well last night so therefore not feeling real well this morning. Took medication with no problem. Will continue to monitor for safety.       Intervention    PRN medication utilized:  no    Instructed in medication usage and effects  Medications administered as ordered  Encouraged to verbalize needs      Response    Verbalized understanding   Did patient take medications as ordered yes   Did patient interact with assessment?  yes     Plan    Will monitor for safety  Will monitor every 15 minutes as ordered  Will evaluate and promote the plan of care    Last BM:  unknown date  (Please chart in I/O as well)

## 2019-04-05 NOTE — PROGRESS NOTES
"4/5/2019    Chief Complaint: psychosis and Severe Neurocognitive Dysfunction      Subjective:    Mr. Nick Austin \"Tenzin\" Lois is a 54 yr old male that is inpatient on the adult U for Psychosis and neuro dysfunction. He was admitted on 3/30/2019     Today Tenzin has been up and more interactive with groups and interaction with peers and staff. He states he remains confused and has problems concentrating. He states the medications have been tolerable and he is not having any adverse effects.     Tenzin's daughter is working on placement for him in [place] as he is not able to care for himself and requires help, he is being referred to a [facility].        Objective     Vital Signs    Temp:  [98.4 °F (36.9 °C)-99.1 °F (37.3 °C)] 98.4 °F (36.9 °C)  Heart Rate:  [80-95] 80  Resp:  [18] 18  BP: ()/(54-69) 110/69    Physical Exam:   General Appearance: alert, appears stated age and cooperative,  Hygiene:   fair  Gait & Station: Normal  Musculoskeletal: No tremors or abnormal involuntary movements    Mental Status Exam:   Cooperation:  Cooperative  Eye Contact:  Fair  Psychomotor Behavior:  Slow  Mood: Apathetic  Affect:  Heavily blunted  Speech:  Normal  Thought Process:  Coherent  Associations: Goal Directed  Thought Content:     Normal   Suicidal:  None   Homicidal:  None   Hallucinations:  Auditory   Delusion:  None  Cognitive Functioning:   Consciousness: awake and alert and Orientation:  Person and Place  Reliability:  fair  Insight:  improving  Judgement:  Impaired  Impulse Control:  Impaired    Lab Results (last 24 hours)     ** No results found for the last 24 hours. **        Imaging Results (last 24 hours)     ** No results found for the last 24 hours. **          Medicine:   Current Facility-Administered Medications:   •  acetaminophen (TYLENOL) tablet 650 mg, 650 mg, Oral, Q4H PRN, Casimiro Rodríguez II, MD  •  aluminum-magnesium hydroxide-simethicone (MAALOX MAX) 400-400-40 MG/5ML suspension 15 mL, 15 mL, " Oral, Q6H PRN, Casimiro Rodríguez II, MD  •  aspirin chewable tablet 81 mg, 81 mg, Oral, Daily, Casimiro Rodríguez II, MD, 81 mg at 04/05/19 0823  •  atorvastatin (LIPITOR) tablet 20 mg, 20 mg, Oral, Nightly, Casimiro Rodríguez II, MD, 20 mg at 04/04/19 2026  •  clonazePAM (KlonoPIN) tablet 1 mg, 1 mg, Oral, BID, Casimiro Rodríguez II, MD, 1 mg at 04/05/19 0824  •  clopidogrel (PLAVIX) tablet 75 mg, 75 mg, Oral, Daily, Casimiro Rodríguez II, MD, 75 mg at 04/05/19 0824  •  donepezil (ARICEPT) tablet 10 mg, 10 mg, Oral, Nightly, Casimiro Rodríguez II, MD, 10 mg at 04/04/19 2026  •  DULoxetine (CYMBALTA) DR capsule 60 mg, 60 mg, Oral, Daily, Aide Sullivan APRN, 60 mg at 04/05/19 0823  •  hydrochlorothiazide (HYDRODIURIL) tablet 25 mg, 25 mg, Oral, Daily, Casimiro Rodríguez II, MD, 25 mg at 04/05/19 0823  •  hydrOXYzine pamoate (VISTARIL) capsule 50 mg, 50 mg, Oral, Q6H PRN, Casimiro Rodríguez II, MD, 50 mg at 03/31/19 2020  •  loperamide (IMODIUM) capsule 2 mg, 2 mg, Oral, 4x Daily PRN, Casimiro Rodríguez II, MD  •  magnesium hydroxide (MILK OF MAGNESIA) suspension 2400 mg/10mL 10 mL, 10 mL, Oral, Daily PRN, Casimiro Rodríguez II, MD  •  melatonin tablet 3 mg, 3 mg, Oral, Nightly, Casimiro Rodríguez II, MD, 3 mg at 04/04/19 2027  •  metoprolol succinate XL (TOPROL-XL) 24 hr tablet 50 mg, 50 mg, Oral, Daily, Casimiro Rodríguez II, MD, 50 mg at 04/05/19 0823  •  pantoprazole (PROTONIX) EC tablet 40 mg, 40 mg, Oral, Daily, Casimiro Rodríguez II, MD, 40 mg at 04/05/19 0823  •  QUEtiapine (SEROquel) tablet 150 mg, 150 mg, Oral, Nightly, Casimiro Rodríguez II, MD, 150 mg at 04/04/19 2026  •  terazosin (HYTRIN) capsule 2 mg, 2 mg, Oral, Nightly, Casimiro Rodríguez II, MD, 2 mg at 04/04/19 2026  •  traZODone (DESYREL) tablet 50 mg, 50 mg, Oral, Nightly PRN, Casimiro Rodríguez II, MD, 50 mg at 04/04/19 2027  •  triamcinolone (KENALOG) 0.1 % cream, , Topical, Q8H PRN, Anne  Casimiro Zavala II, MD, 1 application at 04/03/19 0843    Diagnoses/Assessment:     Unspecified dementia without behavioral disturbance    Severe episode of recurrent major depressive disorder, with psychotic features (CMS/HCC)    Psychosis (CMS/HCC)    Anxiety disorder    Stimulant use disorder    History of anxiolytic abuse    Major neurocognitive disorder      Treatment Plan:    1) Will continue care for the patient on the behavioral health unit at [facility] to ensure patient safety.  2) Will continue to provide treatment with the unit milieu, activities, therapies and psychopharmacological management.  3) Patient to be placed on or continued on  Q15 minute checks  and Suicide and Elopement precautions.  4) Pertinent medical issues:   --Dyslipidemia: Continue Lipitor  -Heart health: Continue aspirin 81 mg daily and Plavix 75 mg daily  -Hypertension: Continue HCTZ 25 mg daily metoprolol XL 50 mg daily  -Skin care continue Kenalog cream every 8 hours  -GERD continue Protonix 40 mg daily    5) Will order following labs: none    6) Will make the following medication changes:   --Continue terazosin 2 mg at bedtime  -Cont Cymbalta  60 mg daily  -Cont Quetiapine to 150mg QHS  --Cont Klonopin 1 mg twice a day given concern for benzodiazepine use at home, prescribed Xanax 2 mg 3 times a day, monitor for need as well as autonomic to make need to consider increase and slow taper given dose at home  --Cont Aricept 10 mg nightly for neurocognitive disorder  -Cont melatonin 3 mg for circadian entrainment      7) Will continue discharge planning as appropriate for patient.  8) Psychotherapy provided for less than 16 minutes.    Treatment plan and medication risks and benefits discussed with: Patient    Aide Sullivan, MORRO  04/05/19  11:56 AM    I saw and examined the patient, as well as being present during the key portions of the E/M service.      I agree with the history as documented by TIFF Sullivan:   --Doing  fairly well.  Difficulties with sleep last night both initial and maintenance insomnia    I agree with the above exam: Heavily blunted affect, but generally improving, improved thought processing less sluggish.    I agree with assessment and plan as outlined above:    --Continue medications as above.  -Reviewed in detail with patient need for sleep changes with primary focus on stimulus control and fixed week time.  Patient reports he will attempt this tonight.      Casimiro Rodríguez II, MD  Psychiatry & Behavioral Sciences  04/05/19  @ 6:21 PM

## 2019-04-05 NOTE — NURSING NOTE
Behavior   Note any precipitants to event or behavior   Describe level and action of any aggressive behavior or speech and associated interventions.     Anxiety: Excess Worry and Restless/Edgy  Depression: depressed mood  Pain  0  AVH   yes   S/I   no  Plan  no  H/I   no  Plan  no    Affect   flat      Note: Patient in room sleeping. Patient denies pain, SI, and HI. He endorses anxiety, depression, and AVH, although he says nothing specific causes his anxiety and depression. He has a flat affect but is calm and cooperative. He took all medications as ordered. Encouraged open communication with staff. No S/S of distress. Will continue to monitor.       Intervention    PRN medication utilized:  yes - Trazodone for sleep    Instructed in medication usage and effects  Medications administered as ordered  Encouraged to verbalize needs      Response    Verbalized understanding   Did patient take medications as ordered yes   Did patient interact with assessment?  yes     Plan    Will monitor for safety  Will monitor every 15 minutes as ordered  Will evaluate and promote the plan of care    Last BM:  April 4, 2019  (Please chart in I/O as well)

## 2019-04-06 PROCEDURE — 99232 SBSQ HOSP IP/OBS MODERATE 35: CPT | Performed by: NURSE PRACTITIONER

## 2019-04-06 RX ADMIN — ASPIRIN 81 MG CHEWABLE TABLET 81 MG: 81 TABLET CHEWABLE at 08:13

## 2019-04-06 RX ADMIN — METOPROLOL SUCCINATE 50 MG: 50 TABLET, EXTENDED RELEASE ORAL at 08:13

## 2019-04-06 RX ADMIN — PANTOPRAZOLE SODIUM 40 MG: 40 TABLET, DELAYED RELEASE ORAL at 08:13

## 2019-04-06 RX ADMIN — CLONAZEPAM 1 MG: 0.5 TABLET ORAL at 20:09

## 2019-04-06 RX ADMIN — ATORVASTATIN CALCIUM 20 MG: 20 TABLET, FILM COATED ORAL at 20:10

## 2019-04-06 RX ADMIN — DULOXETINE 60 MG: 60 CAPSULE, DELAYED RELEASE ORAL at 08:13

## 2019-04-06 RX ADMIN — TERAZOSIN HYDROCHLORIDE 2 MG: 2 CAPSULE ORAL at 20:09

## 2019-04-06 RX ADMIN — QUETIAPINE 150 MG: 100 TABLET ORAL at 20:10

## 2019-04-06 RX ADMIN — DONEPEZIL HYDROCHLORIDE 10 MG: 10 TABLET ORAL at 20:10

## 2019-04-06 RX ADMIN — CLONAZEPAM 1 MG: 0.5 TABLET ORAL at 08:13

## 2019-04-06 RX ADMIN — MELATONIN 3 MG: at 20:10

## 2019-04-06 RX ADMIN — CLOPIDOGREL BISULFATE 75 MG: 75 TABLET ORAL at 08:13

## 2019-04-06 RX ADMIN — HYDROCHLOROTHIAZIDE 25 MG: 25 TABLET ORAL at 08:13

## 2019-04-06 NOTE — PLAN OF CARE
Problem: Patient Care Overview  Goal: Plan of Care Review  Outcome: Ongoing (interventions implemented as appropriate)   04/05/19 2117   Coping/Psychosocial   Plan of Care Reviewed With patient   Coping/Psychosocial   Patient Agreement with Plan of Care agrees   Plan of Care Review   Progress no change     Goal: Individualization and Mutuality  Outcome: Ongoing (interventions implemented as appropriate)    Goal: Discharge Needs Assessment  Outcome: Ongoing (interventions implemented as appropriate)    Goal: Interprofessional Rounds/Family Conf  Outcome: Ongoing (interventions implemented as appropriate)      Problem: Overarching Goals (Adult)  Goal: Adheres to Safety Considerations for Self and Others  Outcome: Ongoing (interventions implemented as appropriate)   04/05/19 2117   Overarching Goals (Adult)   Adheres to Safety Considerations for Self and Others making progress toward outcome     Goal: Optimized Coping Skills in Response to Life Stressors  Outcome: Ongoing (interventions implemented as appropriate)   04/05/19 2117   Overarching Goals (Adult)   Optimized Coping Skills in Response to Life Stressors making progress toward outcome     Goal: Develops/Participates in Therapeutic [place] to Support Successful Transition  Outcome: Ongoing (interventions implemented as appropriate)   04/05/19 2117   Overarching Goals (Adult)   Develops/Participates in Therapeutic [place] to Support Successful Transition making progress toward outcome       Problem: Fall Risk (Adult)  Goal: Absence of Fall  Outcome: Ongoing (interventions implemented as appropriate)   04/05/19 2117   Fall Risk (Adult)   Absence of Fall making progress toward outcome       Problem: Cognitive Impairment (Dementia Signs/Symptoms) (Adult)  Goal: Optimized Cognitive Function (Dementia Signs/Symptoms)  Outcome: Ongoing (interventions implemented as appropriate)   04/05/19 2117   Optimized Cognitive Function (Dementia Signs/Symptoms)   Optimized  Cognitive Ability Action Step (STG) Outcome making progress toward outcome       Problem: Psychological Impairment (Dementia Signs/Symptoms) (Adult)  Goal: Improved Psychological Symptoms (Dementia Signs/Symptoms)  Outcome: Ongoing (interventions implemented as appropriate)   04/05/19 2117   Improved Psychological Symptoms (Dementia Signs/Symptoms)   Improved Psychological Symptoms Action Step (STG) Outcome making progress toward outcome       Problem: Social/Functional Impairment (Dementia Signs/Symptoms) (Adult)  Goal: Improved Social/Functional Skills/Ability (Dementia Signs/Symptoms)  Outcome: Ongoing (interventions implemented as appropriate)   04/05/19 2117   Improved Social/Functional Skills/Ability (Dementia Signs/Symptoms)   Improved Social/Functional Skills Action Step (STG) Outcome making progress toward outcome       Problem: Suicidal Behavior (Adult)  Goal: Suicidal Behavior is Absent/Minimized/Managed  Outcome: Ongoing (interventions implemented as appropriate)   04/05/19 2117   Suicidal Behavior is Absent/Minimized/Managed   Suicidal Behavior Managed/Minimized Action Step (STG) Outcome making progress toward outcome

## 2019-04-06 NOTE — PROGRESS NOTES
"4/6/2019    Chief Complaint:  psychosis and Severe Neurocognitive Dysfunction      Subjective:    Mr. Nick Austin \"Tenzin\" Lois is a 54 yr old male that is inpatient on the adult U for Psychosis and neuro dysfunction. He was admitted on 3/30/2019     Tenzin reports that he tossed and turned during the night, he reports that he believes he sees his mom at times when he wakes up. He states that the medication is working and that he is not experiencing any adverse effects.      He does have trouble having concentrating and states that his mind races at times.         Objective     Vital Signs    Temp:  [98.9 °F (37.2 °C)-99.1 °F (37.3 °C)] 99.1 °F (37.3 °C)  Heart Rate:  [83-88] 83  Resp:  [18] 18  BP: (108)/(61-68) 108/68    Physical Exam:   General Appearance: alert, appears stated age and cooperative,  Hygiene:   fair  Gait & Station: Normal  Musculoskeletal: No tremors or abnormal involuntary movements    Mental Status Exam:   Cooperation:  Cooperative  Eye Contact:  Fair  Psychomotor Behavior:  Appropriate  Mood: Apathetic  Affect:  mood-congruent  Speech:  Minimal and Monotone  Thought Process:  Poverty of thought  Associations: Circumstantial  Thought Content:     Normal   Suicidal:  None   Homicidal:  None   Hallucinations:  Auditory and visual,  States he sees his mother at times   Delusion:  None  Cognitive Functioning:   Consciousness: awake and alert  Reliability:  fair  Insight:  Fair  Judgement:  Impaired  Impulse Control:  impaired    Lab Results (last 24 hours)     ** No results found for the last 24 hours. **        Imaging Results (last 24 hours)     ** No results found for the last 24 hours. **          Medicine:   Current Facility-Administered Medications:   •  acetaminophen (TYLENOL) tablet 650 mg, 650 mg, Oral, Q4H PRN, Casimiro Rodríguez II, MD  •  aluminum-magnesium hydroxide-simethicone (MAALOX MAX) 400-400-40 MG/5ML suspension 15 mL, 15 mL, Oral, Q6H PRN, Casimiro Rodríguez II, MD  •  " aspirin chewable tablet 81 mg, 81 mg, Oral, Daily, Casimiro Rodríguez II, MD, 81 mg at 04/06/19 0813  •  atorvastatin (LIPITOR) tablet 20 mg, 20 mg, Oral, Nightly, Casimiro Rodríguez II, MD, 20 mg at 04/05/19 2032  •  clonazePAM (KlonoPIN) tablet 1 mg, 1 mg, Oral, BID, Casimiro Rodríguez II, MD, 1 mg at 04/06/19 0813  •  clopidogrel (PLAVIX) tablet 75 mg, 75 mg, Oral, Daily, Casimiro Rodríguez II, MD, 75 mg at 04/06/19 0813  •  donepezil (ARICEPT) tablet 10 mg, 10 mg, Oral, Nightly, Casimiro Rodríguez II, MD, 10 mg at 04/05/19 2033  •  DULoxetine (CYMBALTA) DR capsule 60 mg, 60 mg, Oral, Daily, Aide Sullivan APRN, 60 mg at 04/06/19 0813  •  hydrochlorothiazide (HYDRODIURIL) tablet 25 mg, 25 mg, Oral, Daily, Casimiro Rodríguez II, MD, 25 mg at 04/06/19 0813  •  hydrOXYzine pamoate (VISTARIL) capsule 50 mg, 50 mg, Oral, Q6H PRN, Casimiro Rodríguez II, MD, 50 mg at 03/31/19 2020  •  loperamide (IMODIUM) capsule 2 mg, 2 mg, Oral, 4x Daily PRN, Casimiro Rodríguez II, MD  •  magnesium hydroxide (MILK OF MAGNESIA) suspension 2400 mg/10mL 10 mL, 10 mL, Oral, Daily PRN, Casimiro Rodríguez II, MD  •  melatonin tablet 3 mg, 3 mg, Oral, Nightly, Casimiro Rodríguez II, MD, 3 mg at 04/05/19 2034  •  metoprolol succinate XL (TOPROL-XL) 24 hr tablet 50 mg, 50 mg, Oral, Daily, Casimiro Rodríguez II, MD, 50 mg at 04/06/19 0813  •  pantoprazole (PROTONIX) EC tablet 40 mg, 40 mg, Oral, Daily, Casimiro Rodríguez II, MD, 40 mg at 04/06/19 0813  •  QUEtiapine (SEROquel) tablet 150 mg, 150 mg, Oral, Nightly, Casimiro Rodríguez II, MD, 150 mg at 04/05/19 2032  •  terazosin (HYTRIN) capsule 2 mg, 2 mg, Oral, Nightly, Casimiro Rodríguez II, MD, 2 mg at 04/05/19 2032  •  traZODone (DESYREL) tablet 50 mg, 50 mg, Oral, Nightly PRN, Casimiro Rodríguez II, MD, 50 mg at 04/05/19 2033  •  triamcinolone (KENALOG) 0.1 % cream, , Topical, Q8H PRN, Casimiro Rodríguez II, MD, 1 application at 04/03/19  0843    Diagnoses/Assessment:     Unspecified dementia without behavioral disturbance    Severe episode of recurrent major depressive disorder, with psychotic features (CMS/HCC)    Psychosis (CMS/HCC)    Anxiety disorder    Stimulant use disorder    History of anxiolytic abuse    Major neurocognitive disorder      Treatment Plan:    1) Will continue care for the patient on the behavioral health unit at [facility] to ensure patient safety.  2) Will continue to provide treatment with the unit milieu, activities, therapies and psychopharmacological management.  3) Patient to be placed on or continued on  Q15 minute checks  and Suicide and Elopement precautions.  4) Pertinent medical issues:   --Dyslipidemia: Continue Lipitor  -Heart health: Continue aspirin 81 mg daily and Plavix 75 mg daily  -Hypertension: Continue HCTZ 25 mg daily metoprolol XL 50 mg daily  -Skin care continue Kenalog cream every 8 hours  -GERD continue Protonix 40 mg daily    5) Will order following labs: None    6) Will make the following medication changes:   --Continue terazosin 2 mg at bedtime  -Cont Cymbalta  60 mg daily  -Cont Quetiapine to 150mg QHS  --Cont Klonopin 1 mg twice a day given concern for benzodiazepine use at home, prescribed Xanax 2 mg 3 times a day, monitor for need as well as autonomic to make need to consider increase and slow taper given dose at home  --Cont Aricept 10 mg nightly for neurocognitive disorder  -Cont melatonin 3 mg for circadian entrainment    7) Will continue discharge planning as appropriate for patient.  8) Psychotherapy provided for less than 16 minutes.    Treatment plan and medication risks and benefits discussed with: Patient    MORRO Aragon  04/06/19  10:55 AM

## 2019-04-06 NOTE — PLAN OF CARE
Problem: Fall Risk (Adult)  Goal: Absence of Fall  Outcome: Ongoing (interventions implemented as appropriate)      Problem: Cognitive Impairment (Dementia Signs/Symptoms) (Adult)  Goal: Optimized Cognitive Function (Dementia Signs/Symptoms)  Outcome: Ongoing (interventions implemented as appropriate)      Problem: Psychological Impairment (Dementia Signs/Symptoms) (Adult)  Goal: Improved Psychological Symptoms (Dementia Signs/Symptoms)  Outcome: Ongoing (interventions implemented as appropriate)      Problem: Social/Functional Impairment (Dementia Signs/Symptoms) (Adult)  Goal: Improved Social/Functional Skills/Ability (Dementia Signs/Symptoms)  Outcome: Ongoing (interventions implemented as appropriate)      Problem: Suicidal Behavior (Adult)  Goal: Suicidal Behavior is Absent/Minimized/Managed  Outcome: Ongoing (interventions implemented as appropriate)

## 2019-04-06 NOTE — NURSING NOTE
Behavior   Note any precipitants to event or behavior   Describe level and action of any aggressive behavior or speech and associated interventions.     Anxiety: Excess Worry  Depression: depressed mood  Pain  0  AVH   yes   S/I   no  Plan  no  H/I   no  Plan  no    Affect   flat      Note: Interviewed patient in dining area. He is alert, sitting at chair. He has a flat affect, states he is feeling better but he still feels depressed. He is still having AVH. He said he sees his mom and has conversations with people that only he can see. He makes occasional eye contact. He took his medication without difficulty. No abnormal movements or gestures noted at this time. He watches tv in free time.     Intervention    PRN medication utilized:  no    Instructed in medication usage and effects  Medications administered as ordered  Encouraged to verbalize needs      Response    Verbalized understanding   Did patient take medications as ordered yes   Did patient interact with assessment?  yes     Plan    Will monitor for safety  Will monitor every 15 minutes as ordered  Will evaluate and promote the plan of care    Last BM:  unknown date  (Please chart in I/O as well)

## 2019-04-06 NOTE — NURSING NOTE
"Behavior   Note any precipitants to event or behavior   Describe level and action of any aggressive behavior or speech and associated interventions.     Anxiety: Excess Worry and Restless/Edgy  Depression: depressed mood  Pain  0  AVH   no  S/I   no  Plan  no  H/I   no  Plan  no    Affect   flat      Note: Patient in bed sleeping. Patient has a flat affect and is hesitant to make eye contact. He states he is \"irritatable\" today although he states \"I don't know why. I'm just irritated.\" He states his anxiety and depression are \"the same as yesterday.\" Patient took all medications as ordered. Encouraged open communication with staff. No S/S of distress. Will continue to monitor.       Intervention    PRN medication utilized:  yes - trazodone for sleep    Instructed in medication usage and effects  Medications administered as ordered  Encouraged to verbalize needs      Response    Verbalized understanding   Did patient take medications as ordered yes   Did patient interact with assessment?  yes     Plan    Will monitor for safety  Will monitor every 15 minutes as ordered  Will evaluate and promote the plan of care    Last BM:  April 5, 2019  (Please chart in I/O as well)    "

## 2019-04-07 PROCEDURE — 99232 SBSQ HOSP IP/OBS MODERATE 35: CPT | Performed by: NURSE PRACTITIONER

## 2019-04-07 RX ADMIN — DULOXETINE 60 MG: 60 CAPSULE, DELAYED RELEASE ORAL at 09:07

## 2019-04-07 RX ADMIN — HYDROCHLOROTHIAZIDE 25 MG: 25 TABLET ORAL at 09:07

## 2019-04-07 RX ADMIN — QUETIAPINE 150 MG: 100 TABLET ORAL at 20:07

## 2019-04-07 RX ADMIN — CLONAZEPAM 1 MG: 0.5 TABLET ORAL at 09:07

## 2019-04-07 RX ADMIN — DONEPEZIL HYDROCHLORIDE 10 MG: 10 TABLET ORAL at 20:07

## 2019-04-07 RX ADMIN — PANTOPRAZOLE SODIUM 40 MG: 40 TABLET, DELAYED RELEASE ORAL at 09:07

## 2019-04-07 RX ADMIN — CLOPIDOGREL BISULFATE 75 MG: 75 TABLET ORAL at 09:07

## 2019-04-07 RX ADMIN — ATORVASTATIN CALCIUM 20 MG: 20 TABLET, FILM COATED ORAL at 20:07

## 2019-04-07 RX ADMIN — ASPIRIN 81 MG CHEWABLE TABLET 81 MG: 81 TABLET CHEWABLE at 09:07

## 2019-04-07 RX ADMIN — CLONAZEPAM 1 MG: 0.5 TABLET ORAL at 20:07

## 2019-04-07 RX ADMIN — TERAZOSIN HYDROCHLORIDE 2 MG: 2 CAPSULE ORAL at 20:07

## 2019-04-07 RX ADMIN — MELATONIN 3 MG: at 20:07

## 2019-04-07 RX ADMIN — METOPROLOL SUCCINATE 50 MG: 50 TABLET, EXTENDED RELEASE ORAL at 09:07

## 2019-04-07 NOTE — PROGRESS NOTES
"4/7/2019    Chief Complaint: psychosis and Severe Neurocognitive Dysfunction      Subjective:    Mr. Romero \"Tenzin\" Lois is a 54 yr old male that is inpatient on the adult U for psychosis and neuro dysfunction. He was admitted on 3/30/2019    Tenzin reports that he woke up early this morning and has not been able to go back to sleep since then. He states that he still sees images of his mother in his room. He reports excess nervousness and difficulty concentrating at times. He states that he believes his medication is working and he is not having an SI/HI. He reports the hallucination of his mother is \"comforting\" in some ways.     Tenzin is able to communicate his needs and is able to perform ADL's independently.        Objective     Vital Signs    Temp:  [97.1 °F (36.2 °C)-98 °F (36.7 °C)] 98 °F (36.7 °C)  Heart Rate:  [80-89] 89  Resp:  [18] 18  BP: (108-117)/(69-76) 108/69    Physical Exam:   General Appearance: alert, appears stated age, fatigued and cooperative,  Hygiene:   fair  Gait & Station: Normal  Musculoskeletal: No tremors or abnormal involuntary movements    Mental Status Exam:   Cooperation:  Cooperative  Eye Contact:  Fair  Psychomotor Behavior:  Slow  Mood: Apathetic  Affect:  flat  Speech:  Minimal and Monotone  Thought Process:  Poverty of thought  Associations: Goal Directed  Thought Content:     Mood congurent   Suicidal:  None   Homicidal:  None   Hallucinations:  Visual   Delusion:  None  Cognitive Functioning:   Consciousness: awake and alert  Reliability:  fair  Insight:  Fair  Judgement:  Impaired  Impulse Control:  Impaired    Lab Results (last 24 hours)     ** No results found for the last 24 hours. **        Imaging Results (last 24 hours)     ** No results found for the last 24 hours. **          Medicine:   Current Facility-Administered Medications:   •  acetaminophen (TYLENOL) tablet 650 mg, 650 mg, Oral, Q4H PRN, Casimiro Rodríguez II, MD  •  aluminum-magnesium " hydroxide-simethicone (MAALOX MAX) 400-400-40 MG/5ML suspension 15 mL, 15 mL, Oral, Q6H PRN, Casimiro Rodríguez II, MD  •  aspirin chewable tablet 81 mg, 81 mg, Oral, Daily, Casimiro Rodríguez II, MD, 81 mg at 04/07/19 0907  •  atorvastatin (LIPITOR) tablet 20 mg, 20 mg, Oral, Nightly, Casimiro Rodríguez II, MD, 20 mg at 04/06/19 2010  •  clonazePAM (KlonoPIN) tablet 1 mg, 1 mg, Oral, BID, Casimiro Rodríguez II, MD, 1 mg at 04/07/19 0907  •  clopidogrel (PLAVIX) tablet 75 mg, 75 mg, Oral, Daily, Casimiro Rodríguez II, MD, 75 mg at 04/07/19 0907  •  donepezil (ARICEPT) tablet 10 mg, 10 mg, Oral, Nightly, Casimiro Rodríguez II, MD, 10 mg at 04/06/19 2010  •  DULoxetine (CYMBALTA) DR capsule 60 mg, 60 mg, Oral, Daily, Aide Sullivan APRN, 60 mg at 04/07/19 0907  •  hydrochlorothiazide (HYDRODIURIL) tablet 25 mg, 25 mg, Oral, Daily, Casimiro Rodríguez II, MD, 25 mg at 04/07/19 0907  •  hydrOXYzine pamoate (VISTARIL) capsule 50 mg, 50 mg, Oral, Q6H PRN, Casimiro Rodríguez II, MD, 50 mg at 03/31/19 2020  •  loperamide (IMODIUM) capsule 2 mg, 2 mg, Oral, 4x Daily PRN, Casimiro Rodríguez II, MD  •  magnesium hydroxide (MILK OF MAGNESIA) suspension 2400 mg/10mL 10 mL, 10 mL, Oral, Daily PRN, Casimiro Rodríguez II, MD  •  melatonin tablet 3 mg, 3 mg, Oral, Nightly, Casimiro Rodríguez II, MD, 3 mg at 04/06/19 2010  •  metoprolol succinate XL (TOPROL-XL) 24 hr tablet 50 mg, 50 mg, Oral, Daily, Casimiro Rodríguez II, MD, 50 mg at 04/07/19 0907  •  pantoprazole (PROTONIX) EC tablet 40 mg, 40 mg, Oral, Daily, Casimiro Rodríguez II, MD, 40 mg at 04/07/19 0907  •  QUEtiapine (SEROquel) tablet 150 mg, 150 mg, Oral, Nightly, Casimiro Rodríguez II, MD, 150 mg at 04/06/19 2010  •  terazosin (HYTRIN) capsule 2 mg, 2 mg, Oral, Nightly, Casimiro Rodríguez II, MD, 2 mg at 04/06/19 2009  •  traZODone (DESYREL) tablet 50 mg, 50 mg, Oral, Nightly PRN, Casimiro Rodríguez II, MD, 50 mg at  04/05/19 2033  •  triamcinolone (KENALOG) 0.1 % cream, , Topical, Q8H PRN, Casimiro Rodríguez II, MD, 1 application at 04/03/19 0843    Diagnoses/Assessment:     Unspecified dementia without behavioral disturbance    Severe episode of recurrent major depressive disorder, with psychotic features (CMS/HCC)    Psychosis (CMS/HCC)    Anxiety disorder    Stimulant use disorder    History of anxiolytic abuse    Major neurocognitive disorder      Treatment Plan:    1) Will continue care for the patient on the behavioral health unit at [facility] to ensure patient safety.  2) Will continue to provide treatment with the unit milieu, activities, therapies and psychopharmacological management.  3) Patient to be placed on or continued on  Q15 minute checks  and Suicide and Elopement precautions.  4) Pertinent medical issues:   --Dyslipidemia: Continue Lipitor  -Heart health: Continue aspirin 81 mg daily and Plavix 75 mg daily  -Hypertension: Continue HCTZ 25 mg daily metoprolol XL 50 mg daily  -Skin care continue Kenalog cream every 8 hours  -GERD continue Protonix 40 mg daily      5) Will order following labs: none      6) Will make the following medication changes:   --Continue terazosin 2 mg at bedtime  -Cont Cymbalta  60 mg daily  -Cont Quetiapine to 150mg QHS  --Cont Klonopin 1 mg twice a day given concern for benzodiazepine use at home, prescribed Xanax 2 mg 3 times a day, monitor for need as well as autonomic to make need to consider increase and slow taper given dose at home  --Cont Aricept 10 mg nightly for neurocognitive disorder  -Cont melatonin 3 mg for circadian entrainment       7) Will continue discharge planning as appropriate for patient.  8) Psychotherapy provided: less than 16 min       Treatment plan and medication risks and benefits discussed with: Patient    Aide Sullivan, MORRO  04/07/19  10:02 AM

## 2019-04-07 NOTE — PLAN OF CARE
Problem: Patient Care Overview  Goal: Plan of Care Review  Outcome: Ongoing (interventions implemented as appropriate)    Goal: Discharge Needs Assessment  Outcome: Ongoing (interventions implemented as appropriate)    Goal: Interprofessional Rounds/Family Conf  Outcome: Ongoing (interventions implemented as appropriate)      Problem: Overarching Goals (Adult)  Goal: Adheres to Safety Considerations for Self and Others  Outcome: Ongoing (interventions implemented as appropriate)    Goal: Optimized Coping Skills in Response to Life Stressors  Outcome: Ongoing (interventions implemented as appropriate)    Goal: Develops/Participates in Therapeutic [place] to Support Successful Transition  Outcome: Ongoing (interventions implemented as appropriate)      Problem: Fall Risk (Adult)  Goal: Absence of Fall  Outcome: Ongoing (interventions implemented as appropriate)      Problem: Cognitive Impairment (Dementia Signs/Symptoms) (Adult)  Goal: Optimized Cognitive Function (Dementia Signs/Symptoms)  Outcome: Ongoing (interventions implemented as appropriate)      Problem: Psychological Impairment (Dementia Signs/Symptoms) (Adult)  Goal: Improved Psychological Symptoms (Dementia Signs/Symptoms)  Outcome: Ongoing (interventions implemented as appropriate)      Problem: Social/Functional Impairment (Dementia Signs/Symptoms) (Adult)  Goal: Improved Social/Functional Skills/Ability (Dementia Signs/Symptoms)  Outcome: Ongoing (interventions implemented as appropriate)      Problem: Suicidal Behavior (Adult)  Goal: Suicidal Behavior is Absent/Minimized/Managed  Outcome: Ongoing (interventions implemented as appropriate)

## 2019-04-07 NOTE — NURSING NOTE
"Behavior   Note any precipitants to event or behavior   Describe level and action of any aggressive behavior or speech and associated interventions.     Anxiety: Excess Worry and Restless/Edgy  Depression: depressed mood  Pain  0  AVH   yes   S/I   no  Plan  no  H/I   no  Plan  no    Affect   flat      Note:Patient sitting in dining area with interacting with peer. Patient voices frustration over not being able to remember current things \" I can't drive anymore because I can't remember how to get around. But I can remember what [place] looked like 30 years age.\" Patient worries about his anxiety and seeing and hearing things that are not there.      Intervention    PRN medication utilized:  no    Instructed in medication usage and effects  Medications administered as ordered  Encouraged to verbalize needs      Response    Verbalized understanding   Did patient take medications as ordered yes   Did patient interact with assessment?  yes     Plan    Will monitor for safety  Will monitor every 15 minutes as ordered  Will evaluate and promote the plan of care    Last BM:  unknown date  (Please chart in I/O as well)  "

## 2019-04-07 NOTE — PLAN OF CARE
Problem: Patient Care Overview  Goal: Plan of Care Review  Outcome: Ongoing (interventions implemented as appropriate)      Problem: Overarching Goals (Adult)  Goal: Adheres to Safety Considerations for Self and Others  Outcome: Ongoing (interventions implemented as appropriate)    Goal: Optimized Coping Skills in Response to Life Stressors  Outcome: Ongoing (interventions implemented as appropriate)    Goal: Develops/Participates in Therapeutic [place] to Support Successful Transition  Outcome: Ongoing (interventions implemented as appropriate)      Problem: Fall Risk (Adult)  Goal: Absence of Fall  Outcome: Ongoing (interventions implemented as appropriate)      Problem: Cognitive Impairment (Dementia Signs/Symptoms) (Adult)  Goal: Optimized Cognitive Function (Dementia Signs/Symptoms)  Outcome: Ongoing (interventions implemented as appropriate)      Problem: Psychological Impairment (Dementia Signs/Symptoms) (Adult)  Goal: Improved Psychological Symptoms (Dementia Signs/Symptoms)  Outcome: Ongoing (interventions implemented as appropriate)      Problem: Social/Functional Impairment (Dementia Signs/Symptoms) (Adult)  Goal: Improved Social/Functional Skills/Ability (Dementia Signs/Symptoms)  Outcome: Ongoing (interventions implemented as appropriate)      Problem: Suicidal Behavior (Adult)  Goal: Suicidal Behavior is Absent/Minimized/Managed  Outcome: Ongoing (interventions implemented as appropriate)

## 2019-04-07 NOTE — NURSING NOTE
"Behavior   Note any precipitants to event or behavior   Describe level and action of any aggressive behavior or speech and associated interventions.     Anxiety: Excess Worry, Restless/Edgy and Decreased concentration  Depression: depressed mood, difficulty concentrating and hopelessness  Pain  0  AVH   yes  S/I   no  Plan  no  H/I   no  Plan  no    Affect   blunted      Note: Patient has been in his room most of the morning, he avoids social contact and appears nervous and restless. He is alert but states he is worried because he \"should remember more than I do\". He took his medication well, states he does not feel rested and request to remain in bed. He did not interact much with staff but agrees to notify staff of any concerns/needs      Intervention    PRN medication utilized:  no    Instructed in medication usage and effects  Medications administered as ordered  Encouraged to verbalize needs      Response    Verbalized understanding   Did patient take medications as ordered yes   Did patient interact with assessment?  yes     Plan    Will monitor for safety  Will monitor every 15 minutes as ordered  Will evaluate and promote the plan of care    Last BM:    (Please chart in I/O as well)    "

## 2019-04-08 PROCEDURE — 99232 SBSQ HOSP IP/OBS MODERATE 35: CPT | Performed by: NURSE PRACTITIONER

## 2019-04-08 RX ORDER — TERAZOSIN 2 MG/1
2 CAPSULE ORAL EVERY 12 HOURS SCHEDULED
Status: DISCONTINUED | OUTPATIENT
Start: 2019-04-08 | End: 2019-04-09 | Stop reason: HOSPADM

## 2019-04-08 RX ORDER — PROPRANOLOL HYDROCHLORIDE 10 MG/1
10 TABLET ORAL 2 TIMES DAILY PRN
Status: DISCONTINUED | OUTPATIENT
Start: 2019-04-08 | End: 2019-04-09 | Stop reason: HOSPADM

## 2019-04-08 RX ADMIN — DONEPEZIL HYDROCHLORIDE 10 MG: 10 TABLET ORAL at 20:24

## 2019-04-08 RX ADMIN — CLOPIDOGREL BISULFATE 75 MG: 75 TABLET ORAL at 08:02

## 2019-04-08 RX ADMIN — ATORVASTATIN CALCIUM 20 MG: 20 TABLET, FILM COATED ORAL at 20:24

## 2019-04-08 RX ADMIN — MELATONIN 3 MG: at 20:24

## 2019-04-08 RX ADMIN — HYDROCHLOROTHIAZIDE 25 MG: 25 TABLET ORAL at 08:02

## 2019-04-08 RX ADMIN — CLONAZEPAM 1 MG: 0.5 TABLET ORAL at 08:02

## 2019-04-08 RX ADMIN — CLONAZEPAM 1 MG: 0.5 TABLET ORAL at 20:24

## 2019-04-08 RX ADMIN — METOPROLOL SUCCINATE 50 MG: 50 TABLET, EXTENDED RELEASE ORAL at 08:02

## 2019-04-08 RX ADMIN — QUETIAPINE 150 MG: 100 TABLET ORAL at 20:24

## 2019-04-08 RX ADMIN — TRAZODONE HYDROCHLORIDE 50 MG: 50 TABLET ORAL at 21:21

## 2019-04-08 RX ADMIN — PANTOPRAZOLE SODIUM 40 MG: 40 TABLET, DELAYED RELEASE ORAL at 08:02

## 2019-04-08 RX ADMIN — TERAZOSIN HYDROCHLORIDE 2 MG: 2 CAPSULE ORAL at 20:24

## 2019-04-08 RX ADMIN — DULOXETINE 60 MG: 60 CAPSULE, DELAYED RELEASE ORAL at 08:02

## 2019-04-08 RX ADMIN — ASPIRIN 81 MG CHEWABLE TABLET 81 MG: 81 TABLET CHEWABLE at 08:02

## 2019-04-08 NOTE — NURSING NOTE
Behavior   Note any precipitants to event or behavior   Describe level and action of any aggressive behavior or speech and associated interventions.     Anxiety: Patient denies at this time  Depression: Patient denies at this time  Pain  0  AVH   no  S/I   no  Plan  no  H/I   no  Plan  no    Affect   flat      Note:Pt is alert, oriented x3 verbal and ambulatory. Cooperative and compliant this morning with taking medications. No outbursts of aggression. Appropriate interaction with others. Will monitor for safety.       Intervention    PRN medication utilized:  no    Instructed in medication usage and effects  Medications administered as ordered  Encouraged to verbalize needs      Response    Verbalized understanding   Did patient take medications as ordered yes   Did patient interact with assessment?  yes     Plan    Will monitor for safety  Will monitor every 15 minutes as ordered  Will evaluate and promote the plan of care    Last BM:  unknown date  (Please chart in I/O as well)
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
## 2019-04-08 NOTE — NURSING NOTE
Behavior   Note any precipitants to event or behavior   Describe level and action of any aggressive behavior or speech and associated interventions.     Anxiety: Excess Worry and Restless/Edgy  Depression: depressed mood and difficulty concentrating  Pain  0  AVH   yes   S/I   no  Plan  no  H/I   no  Plan  no    Affect   blunted      Note:Patient sitting in visitors lounge watching TV. Patient complains of anxiety. Patient is compliant with medications.      Intervention    PRN medication utilized:  no    Instructed in medication usage and effects  Medications administered as ordered  Encouraged to verbalize needs      Response    Verbalized understanding   Did patient take medications as ordered yes   Did patient interact with assessment?  yes     Plan    Will monitor for safety  Will monitor every 15 minutes as ordered  Will evaluate and promote the plan of care    Last BM:  unknown date  (Please chart in I/O as well)

## 2019-04-08 NOTE — PLAN OF CARE
Problem: Fall Risk (Adult)  Goal: Absence of Fall  Outcome: Ongoing (interventions implemented as appropriate)      Problem: Psychological Impairment (Dementia Signs/Symptoms) (Adult)  Goal: Improved Psychological Symptoms (Dementia Signs/Symptoms)  Outcome: Ongoing (interventions implemented as appropriate)      Problem: Social/Functional Impairment (Dementia Signs/Symptoms) (Adult)  Goal: Improved Social/Functional Skills/Ability (Dementia Signs/Symptoms)  Outcome: Ongoing (interventions implemented as appropriate)      Problem: Suicidal Behavior (Adult)  Goal: Suicidal Behavior is Absent/Minimized/Managed  Outcome: Ongoing (interventions implemented as appropriate)

## 2019-04-08 NOTE — PLAN OF CARE
Problem: Patient Care Overview  Goal: Plan of Care Review  Outcome: Ongoing (interventions implemented as appropriate)    Goal: Individualization and Mutuality  Outcome: Ongoing (interventions implemented as appropriate)    Goal: Discharge Needs Assessment  Outcome: Ongoing (interventions implemented as appropriate)    Goal: Interprofessional Rounds/Family Conf  Outcome: Ongoing (interventions implemented as appropriate)      Problem: Overarching Goals (Adult)  Goal: Adheres to Safety Considerations for Self and Others  Outcome: Ongoing (interventions implemented as appropriate)    Goal: Optimized Coping Skills in Response to Life Stressors  Outcome: Ongoing (interventions implemented as appropriate)    Goal: Develops/Participates in Therapeutic [place] to Support Successful Transition  Outcome: Ongoing (interventions implemented as appropriate)      Problem: Fall Risk (Adult)  Goal: Absence of Fall  Outcome: Ongoing (interventions implemented as appropriate)      Problem: Cognitive Impairment (Dementia Signs/Symptoms) (Adult)  Goal: Optimized Cognitive Function (Dementia Signs/Symptoms)  Outcome: Ongoing (interventions implemented as appropriate)      Problem: Psychological Impairment (Dementia Signs/Symptoms) (Adult)  Goal: Improved Psychological Symptoms (Dementia Signs/Symptoms)  Outcome: Ongoing (interventions implemented as appropriate)      Problem: Social/Functional Impairment (Dementia Signs/Symptoms) (Adult)  Goal: Improved Social/Functional Skills/Ability (Dementia Signs/Symptoms)  Outcome: Ongoing (interventions implemented as appropriate)      Problem: Suicidal Behavior (Adult)  Goal: Suicidal Behavior is Absent/Minimized/Managed  Outcome: Ongoing (interventions implemented as appropriate)

## 2019-04-09 VITALS
OXYGEN SATURATION: 94 % | TEMPERATURE: 96.1 F | BODY MASS INDEX: 27.9 KG/M2 | HEIGHT: 72 IN | WEIGHT: 206 LBS | RESPIRATION RATE: 18 BRPM | DIASTOLIC BLOOD PRESSURE: 63 MMHG | SYSTOLIC BLOOD PRESSURE: 111 MMHG | HEART RATE: 91 BPM

## 2019-04-09 PROBLEM — F29 PSYCHOSIS (HCC): Status: RESOLVED | Noted: 2019-03-30 | Resolved: 2019-04-09

## 2019-04-09 PROCEDURE — 99239 HOSP IP/OBS DSCHRG MGMT >30: CPT | Performed by: PSYCHIATRY & NEUROLOGY

## 2019-04-09 RX ORDER — METOPROLOL SUCCINATE 50 MG/1
50 TABLET, EXTENDED RELEASE ORAL DAILY
Qty: 30 TABLET | Refills: 0 | Status: ON HOLD | OUTPATIENT
Start: 2019-04-09 | End: 2020-05-13

## 2019-04-09 RX ORDER — METOPROLOL SUCCINATE 50 MG/1
50 TABLET, EXTENDED RELEASE ORAL DAILY
Qty: 30 TABLET | Refills: 0 | Status: SHIPPED | OUTPATIENT
Start: 2019-04-09 | End: 2019-04-09 | Stop reason: SDUPTHER

## 2019-04-09 RX ORDER — DULOXETIN HYDROCHLORIDE 60 MG/1
60 CAPSULE, DELAYED RELEASE ORAL DAILY
Qty: 30 CAPSULE | Refills: 0 | Status: SHIPPED | OUTPATIENT
Start: 2019-04-09 | End: 2019-04-09 | Stop reason: SDUPTHER

## 2019-04-09 RX ORDER — CLOPIDOGREL BISULFATE 75 MG/1
75 TABLET ORAL DAILY
Qty: 30 TABLET | Refills: 0 | Status: SHIPPED | OUTPATIENT
Start: 2019-04-09

## 2019-04-09 RX ORDER — LANOLIN ALCOHOL/MO/W.PET/CERES
3 CREAM (GRAM) TOPICAL NIGHTLY
Qty: 30 TABLET | Refills: 0 | Status: SHIPPED | OUTPATIENT
Start: 2019-04-09 | End: 2019-04-09

## 2019-04-09 RX ORDER — CLONAZEPAM 1 MG/1
1 TABLET ORAL 2 TIMES DAILY
Qty: 30 TABLET | Refills: 0 | Status: SHIPPED | OUTPATIENT
Start: 2019-04-09 | End: 2019-04-09

## 2019-04-09 RX ORDER — DULOXETIN HYDROCHLORIDE 60 MG/1
60 CAPSULE, DELAYED RELEASE ORAL DAILY
Qty: 30 CAPSULE | Refills: 0 | Status: ON HOLD | OUTPATIENT
Start: 2019-04-09 | End: 2020-05-13

## 2019-04-09 RX ORDER — PANTOPRAZOLE SODIUM 40 MG/1
40 TABLET, DELAYED RELEASE ORAL DAILY
Qty: 30 TABLET | Refills: 0 | Status: ON HOLD | OUTPATIENT
Start: 2019-04-09 | End: 2020-05-13

## 2019-04-09 RX ORDER — CLONAZEPAM 1 MG/1
1 TABLET ORAL 2 TIMES DAILY
Qty: 30 TABLET | Refills: 0 | Status: ON HOLD | OUTPATIENT
Start: 2019-04-09 | End: 2020-05-13

## 2019-04-09 RX ORDER — ASPIRIN 81 MG/1
81 TABLET, CHEWABLE ORAL DAILY
Qty: 30 TABLET | Refills: 0 | Status: SHIPPED | OUTPATIENT
Start: 2019-04-09

## 2019-04-09 RX ORDER — DONEPEZIL HYDROCHLORIDE 10 MG/1
10 TABLET, FILM COATED ORAL NIGHTLY
Qty: 30 TABLET | Refills: 0 | Status: SHIPPED | OUTPATIENT
Start: 2019-04-09 | End: 2019-04-09 | Stop reason: SDUPTHER

## 2019-04-09 RX ORDER — TERAZOSIN 2 MG/1
2 CAPSULE ORAL 2 TIMES DAILY
Qty: 60 CAPSULE | Refills: 0 | Status: SHIPPED | OUTPATIENT
Start: 2019-04-09 | End: 2019-04-09 | Stop reason: SDUPTHER

## 2019-04-09 RX ORDER — ATORVASTATIN CALCIUM 20 MG/1
20 TABLET, FILM COATED ORAL NIGHTLY
Qty: 30 TABLET | Refills: 0 | Status: SHIPPED | OUTPATIENT
Start: 2019-04-09

## 2019-04-09 RX ORDER — PANTOPRAZOLE SODIUM 40 MG/1
40 TABLET, DELAYED RELEASE ORAL DAILY
Qty: 30 TABLET | Refills: 0 | Status: SHIPPED | OUTPATIENT
Start: 2019-04-09 | End: 2019-04-09 | Stop reason: SDUPTHER

## 2019-04-09 RX ORDER — QUETIAPINE FUMARATE 300 MG/1
150 TABLET, FILM COATED ORAL NIGHTLY
Qty: 15 TABLET | Refills: 0 | Status: ON HOLD | OUTPATIENT
Start: 2019-04-09 | End: 2020-05-13

## 2019-04-09 RX ORDER — ATORVASTATIN CALCIUM 20 MG/1
20 TABLET, FILM COATED ORAL NIGHTLY
Qty: 30 TABLET | Refills: 0 | Status: SHIPPED | OUTPATIENT
Start: 2019-04-09 | End: 2019-04-09 | Stop reason: SDUPTHER

## 2019-04-09 RX ORDER — HYDROCHLOROTHIAZIDE 25 MG/1
25 TABLET ORAL DAILY
Qty: 30 TABLET | Refills: 0 | Status: SHIPPED | OUTPATIENT
Start: 2019-04-09 | End: 2019-04-09 | Stop reason: SDUPTHER

## 2019-04-09 RX ORDER — HYDROCHLOROTHIAZIDE 25 MG/1
25 TABLET ORAL DAILY
Qty: 30 TABLET | Refills: 0 | Status: ON HOLD | OUTPATIENT
Start: 2019-04-09 | End: 2020-05-13

## 2019-04-09 RX ORDER — QUETIAPINE FUMARATE 300 MG/1
150 TABLET, FILM COATED ORAL NIGHTLY
Qty: 15 TABLET | Refills: 0 | Status: SHIPPED | OUTPATIENT
Start: 2019-04-09 | End: 2019-04-09 | Stop reason: SDUPTHER

## 2019-04-09 RX ORDER — LANOLIN ALCOHOL/MO/W.PET/CERES
3 CREAM (GRAM) TOPICAL NIGHTLY
Qty: 30 TABLET | Refills: 0 | Status: ON HOLD | OUTPATIENT
Start: 2019-04-09 | End: 2020-05-13

## 2019-04-09 RX ORDER — CLOPIDOGREL BISULFATE 75 MG/1
75 TABLET ORAL DAILY
Qty: 30 TABLET | Refills: 0 | Status: SHIPPED | OUTPATIENT
Start: 2019-04-09 | End: 2019-04-09 | Stop reason: SDUPTHER

## 2019-04-09 RX ORDER — DONEPEZIL HYDROCHLORIDE 10 MG/1
10 TABLET, FILM COATED ORAL NIGHTLY
Qty: 30 TABLET | Refills: 0 | Status: SHIPPED | OUTPATIENT
Start: 2019-04-09 | End: 2020-05-15 | Stop reason: HOSPADM

## 2019-04-09 RX ORDER — TERAZOSIN 2 MG/1
2 CAPSULE ORAL 2 TIMES DAILY
Qty: 60 CAPSULE | Refills: 0 | Status: ON HOLD | OUTPATIENT
Start: 2019-04-09 | End: 2020-05-13

## 2019-04-09 RX ORDER — ASPIRIN 81 MG/1
81 TABLET, CHEWABLE ORAL DAILY
Qty: 30 TABLET | Refills: 0 | Status: SHIPPED | OUTPATIENT
Start: 2019-04-09 | End: 2019-04-09 | Stop reason: SDUPTHER

## 2019-04-09 RX ADMIN — TERAZOSIN HYDROCHLORIDE 2 MG: 2 CAPSULE ORAL at 08:55

## 2019-04-09 RX ADMIN — CLOPIDOGREL BISULFATE 75 MG: 75 TABLET ORAL at 08:55

## 2019-04-09 RX ADMIN — DULOXETINE 60 MG: 60 CAPSULE, DELAYED RELEASE ORAL at 08:55

## 2019-04-09 RX ADMIN — HYDROCHLOROTHIAZIDE 25 MG: 25 TABLET ORAL at 08:54

## 2019-04-09 RX ADMIN — CLONAZEPAM 1 MG: 0.5 TABLET ORAL at 08:54

## 2019-04-09 RX ADMIN — PANTOPRAZOLE SODIUM 40 MG: 40 TABLET, DELAYED RELEASE ORAL at 08:55

## 2019-04-09 RX ADMIN — METOPROLOL SUCCINATE 50 MG: 50 TABLET, EXTENDED RELEASE ORAL at 08:55

## 2019-04-09 RX ADMIN — ASPIRIN 81 MG CHEWABLE TABLET 81 MG: 81 TABLET CHEWABLE at 08:55

## 2019-04-09 NOTE — NURSING NOTE
"Behavior   Note any precipitants to event or behavior   Describe level and action of any aggressive behavior or speech and associated interventions.     Anxiety: Excess Worry, Decreased sleep and Decreased concentration  Depression: difficulty concentrating and impaired memory  Pain  0  AVH   not at this moment but has been having AVH of family members.   S/I   no  Plan  no  H/I   no  Plan  no    Affect   flat      Note: Patient interviewed in his room. He is anxious he wants to discharge to personal care home and worried he will not be able to retrieve his stuff from the cousin that he has been living with. He states that they have \"everything that I own.\" He says that he got agitated last night and he doesn't want to do that again because he wants to be able to leave today. He has been calm and pleasant with a flat affect. No abnormal movements or gestures noted at this time.        Intervention    PRN medication utilized:  no    Instructed in medication usage and effects  Medications administered as ordered  Encouraged to verbalize needs      Response    Verbalized understanding   Did patient take medications as ordered yes   Did patient interact with assessment?  yes     Plan    Will monitor for safety  Will monitor every 15 minutes as ordered  Will evaluate and promote the plan of care    Last BM:  unknown date  (Please chart in I/O as well)    "

## 2019-04-09 NOTE — SIGNIFICANT NOTE
04/09/19 1446   Individual Counseling   Length of Session 30   Topic Safety/dc Plan   Patient Response LCSW met with pt 1:1 and reviewed safety/dc plan. Pt was evaluated by Erik  at [place], [facility] and was accepted at their facility. LCSW coordinated with pt's cousin to get pt's belongings brought to the hospital. Pt presents today with a good mood, affect is bright. Pt reports feeling hopeful and motivated due to finding new placement in [place] at [facility]. Pt denies SI.Hi, AVH, does not make any irrational or delusional statements. Pt does not appear to be having any AVH today. Pt appears to be at baseline and stable to dc. Pt has participated in tx, has been med compliant. Pt will be seen and evaluated by [facility] for outpt tx. Pt was educated on Crisis Hotline, advised to call as needed. Insight remains poor, impulse control and judgement slightly impaired. Nonetheless, pt dc'ing to [facility] and will be transported by Abilio Valencia . BPRS completd upon dc.

## 2019-04-09 NOTE — PLAN OF CARE
Problem: Patient Care Overview  Goal: Plan of Care Review  Outcome: Ongoing (interventions implemented as appropriate)    Goal: Individualization and Mutuality  Outcome: Ongoing (interventions implemented as appropriate)    Goal: Discharge Needs Assessment  Outcome: Ongoing (interventions implemented as appropriate)    Goal: Interprofessional Rounds/Family Conf  Outcome: Ongoing (interventions implemented as appropriate)      Problem: Overarching Goals (Adult)  Goal: Adheres to Safety Considerations for Self and Others  Outcome: Ongoing (interventions implemented as appropriate)    Goal: Optimized Coping Skills in Response to Life Stressors  Outcome: Ongoing (interventions implemented as appropriate)    Goal: Develops/Participates in Therapeutic [place] to Support Successful Transition  Outcome: Ongoing (interventions implemented as appropriate)      Problem: Fall Risk (Adult)  Goal: Absence of Fall  Outcome: Ongoing (interventions implemented as appropriate)      Problem: Cognitive Impairment (Dementia Signs/Symptoms) (Adult)  Goal: Optimized Cognitive Function (Dementia Signs/Symptoms)  Outcome: Ongoing (interventions implemented as appropriate)      Problem: Psychological Impairment (Dementia Signs/Symptoms) (Adult)  Goal: Improved Psychological Symptoms (Dementia Signs/Symptoms)  Outcome: Ongoing (interventions implemented as appropriate)      Problem: Social/Functional Impairment (Dementia Signs/Symptoms) (Adult)  Goal: Improved Social/Functional Skills/Ability (Dementia Signs/Symptoms)  Outcome: Ongoing (interventions implemented as appropriate)      Problem: Suicidal Behavior (Adult)  Goal: Suicidal Behavior is Absent/Minimized/Managed  Outcome: Ongoing (interventions implemented as appropriate)
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
## 2019-04-09 NOTE — NURSING NOTE
"Behavior   Note any precipitants to event or behavior   Describe level and action of any aggressive behavior or speech and associated interventions.     Anxiety: Excess Worry, Restless/Edgy and Decreased concentration  Depression: difficulty concentrating  Pain  0  AVH   no  S/I   no  Plan  no  H/I   no  Plan  no    Affect   flat      Note:Patient very agitated because he could not reach the people he has been staying to get his personal belongings. Patient left message on their phone he stated \" You know it's me. You would  if it was not a hospital number. You will be sorry you son of a bitch.\" Then patient got upset over being unable to reach his daughter and wanted AMA paper and stated \" I am leaving now. I signed in voluntary so I can leave.\" Spoke with patient about how he needed to stay here and that he could not just leave at this time. Explained to him we were here to help him. His daughter then called back and after speaking to her he apologized and calmed down. Patient took all night medications.    Intervention    PRN medication utilized:  yes - Trazodone    Instructed in medication usage and effects  Medications administered as ordered  Encouraged to verbalize needs      Response    Verbalized understanding   Did patient take medications as ordered yes   Did patient interact with assessment?  yes     Plan    Will monitor for safety  Will monitor every 15 minutes as ordered  Will evaluate and promote the plan of care    Last BM:  unknown date  (Please chart in I/O as well)  "
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
## 2019-04-09 NOTE — DISCHARGE INSTRUCTIONS
Return to ED for any medical emergency. Call Medical Records for any question about your visit  [phone].

## 2019-04-09 NOTE — NURSING NOTE
Patient ambulated from [facility] for discharge to assisted living facility. Patient stable with no s/sx of distress. All d/c paperwork, prescriptions and follow up appointments discussed with patient. Patient verbalized understanding. All paperwork signed. Prescriptions, and personal belongings returned to patient.

## 2019-07-27 ENCOUNTER — APPOINTMENT (OUTPATIENT)
Dept: CT IMAGING | Facility: HOSPITAL | Age: 54
End: 2019-07-27

## 2019-07-27 ENCOUNTER — APPOINTMENT (OUTPATIENT)
Dept: GENERAL RADIOLOGY | Facility: HOSPITAL | Age: 54
End: 2019-07-27

## 2019-07-27 ENCOUNTER — HOSPITAL ENCOUNTER (EMERGENCY)
Facility: HOSPITAL | Age: 54
Discharge: HOME | End: 2019-07-28
Attending: EMERGENCY MEDICINE | Admitting: EMERGENCY MEDICINE

## 2019-07-27 DIAGNOSIS — K85.90 ACUTE PANCREATITIS WITHOUT INFECTION OR NECROSIS, UNSPECIFIED PANCREATITIS TYPE: ICD-10-CM

## 2019-07-27 DIAGNOSIS — R06.00 DYSPNEA, UNSPECIFIED TYPE: Primary | ICD-10-CM

## 2019-07-27 LAB
ALBUMIN SERPL-MCNC: 4.4 G/DL (ref 3.5–5.2)
ALBUMIN/GLOB SERPL: 1.5 G/DL
ALP SERPL-CCNC: 96 U/L (ref 39–117)
ALT SERPL W P-5'-P-CCNC: 31 U/L (ref 1–41)
AMPHET+METHAMPHET UR QL: NEGATIVE
ANION GAP SERPL CALCULATED.3IONS-SCNC: 15 MMOL/L (ref 5–15)
AST SERPL-CCNC: 32 U/L (ref 1–40)
BACTERIA UR QL AUTO: ABNORMAL /HPF
BARBITURATES UR QL SCN: NEGATIVE
BASOPHILS # BLD AUTO: 0.02 10*3/MM3 (ref 0–0.2)
BASOPHILS NFR BLD AUTO: 0.3 % (ref 0–1.5)
BENZODIAZ UR QL SCN: POSITIVE
BILIRUB SERPL-MCNC: 0.5 MG/DL (ref 0.2–1.2)
BILIRUB UR QL STRIP: NEGATIVE
BUN BLD-MCNC: 9 MG/DL (ref 6–20)
BUN/CREAT SERPL: 10.6 (ref 7–25)
CALCIUM SPEC-SCNC: 10.7 MG/DL (ref 8.6–10.5)
CANNABINOIDS SERPL QL: NEGATIVE
CHLORIDE SERPL-SCNC: 100 MMOL/L (ref 98–107)
CLARITY UR: CLEAR
CO2 SERPL-SCNC: 26 MMOL/L (ref 22–29)
COCAINE UR QL: NEGATIVE
COLOR UR: ABNORMAL
CREAT BLD-MCNC: 0.85 MG/DL (ref 0.76–1.27)
D-DIMER, QUANTITATIVE (MAD,POW, STR): 904 NG/ML (FEU) (ref 0–470)
DEPRECATED RDW RBC AUTO: 42.2 FL (ref 37–54)
EOSINOPHIL # BLD AUTO: 0.13 10*3/MM3 (ref 0–0.4)
EOSINOPHIL NFR BLD AUTO: 2.1 % (ref 0.3–6.2)
ERYTHROCYTE [DISTWIDTH] IN BLOOD BY AUTOMATED COUNT: 13.1 % (ref 12.3–15.4)
ETHANOL BLD-MCNC: <10 MG/DL (ref 0–10)
ETHANOL UR QL: <0.01 %
GFR SERPL CREATININE-BSD FRML MDRD: 94 ML/MIN/1.73
GLOBULIN UR ELPH-MCNC: 3 GM/DL
GLUCOSE BLD-MCNC: 129 MG/DL (ref 65–99)
GLUCOSE UR STRIP-MCNC: NEGATIVE MG/DL
HCT VFR BLD AUTO: 49.6 % (ref 37.5–51)
HGB BLD-MCNC: 17.4 G/DL (ref 13–17.7)
HGB UR QL STRIP.AUTO: NEGATIVE
HOLD SPECIMEN: NORMAL
HYALINE CASTS UR QL AUTO: ABNORMAL /LPF
IMM GRANULOCYTES # BLD AUTO: 0.01 10*3/MM3 (ref 0–0.05)
IMM GRANULOCYTES NFR BLD AUTO: 0.2 % (ref 0–0.5)
KETONES UR QL STRIP: NEGATIVE
LEUKOCYTE ESTERASE UR QL STRIP.AUTO: NEGATIVE
LIPASE SERPL-CCNC: 216 U/L (ref 13–60)
LYMPHOCYTES # BLD AUTO: 1.46 10*3/MM3 (ref 0.7–3.1)
LYMPHOCYTES NFR BLD AUTO: 23 % (ref 19.6–45.3)
MCH RBC QN AUTO: 31.1 PG (ref 26.6–33)
MCHC RBC AUTO-ENTMCNC: 35.1 G/DL (ref 31.5–35.7)
MCV RBC AUTO: 88.6 FL (ref 79–97)
METHADONE UR QL SCN: NEGATIVE
MONOCYTES # BLD AUTO: 0.64 10*3/MM3 (ref 0.1–0.9)
MONOCYTES NFR BLD AUTO: 10.1 % (ref 5–12)
NEUTROPHILS # BLD AUTO: 4.08 10*3/MM3 (ref 1.7–7)
NEUTROPHILS NFR BLD AUTO: 64.3 % (ref 42.7–76)
NITRITE UR QL STRIP: NEGATIVE
NRBC BLD AUTO-RTO: 0 /100 WBC (ref 0–0.2)
NT-PROBNP SERPL-MCNC: 17.4 PG/ML (ref 5–900)
OPIATES UR QL: NEGATIVE
OXYCODONE UR QL SCN: NEGATIVE
PH UR STRIP.AUTO: 5.5 [PH] (ref 5–9)
PLATELET # BLD AUTO: 165 10*3/MM3 (ref 140–450)
PMV BLD AUTO: 10.8 FL (ref 6–12)
POTASSIUM BLD-SCNC: 3.8 MMOL/L (ref 3.5–5.2)
PROT SERPL-MCNC: 7.4 G/DL (ref 6–8.5)
PROT UR QL STRIP: ABNORMAL
RBC # BLD AUTO: 5.6 10*6/MM3 (ref 4.14–5.8)
RBC # UR: ABNORMAL /HPF
REF LAB TEST METHOD: ABNORMAL
SODIUM BLD-SCNC: 141 MMOL/L (ref 136–145)
SP GR UR STRIP: 1.02 (ref 1–1.03)
SQUAMOUS #/AREA URNS HPF: ABNORMAL /HPF
TROPONIN T SERPL-MCNC: <0.01 NG/ML (ref 0–0.03)
UROBILINOGEN UR QL STRIP: ABNORMAL
WBC NRBC COR # BLD: 6.34 10*3/MM3 (ref 3.4–10.8)
WBC UR QL AUTO: ABNORMAL /HPF

## 2019-07-27 PROCEDURE — 81001 URINALYSIS AUTO W/SCOPE: CPT | Performed by: EMERGENCY MEDICINE

## 2019-07-27 PROCEDURE — 80307 DRUG TEST PRSMV CHEM ANLYZR: CPT | Performed by: EMERGENCY MEDICINE

## 2019-07-27 PROCEDURE — 70450 CT HEAD/BRAIN W/O DYE: CPT

## 2019-07-27 PROCEDURE — 71045 X-RAY EXAM CHEST 1 VIEW: CPT

## 2019-07-27 PROCEDURE — 85379 FIBRIN DEGRADATION QUANT: CPT | Performed by: EMERGENCY MEDICINE

## 2019-07-27 PROCEDURE — 99284 EMERGENCY DEPT VISIT MOD MDM: CPT

## 2019-07-27 PROCEDURE — 93005 ELECTROCARDIOGRAM TRACING: CPT | Performed by: EMERGENCY MEDICINE

## 2019-07-27 PROCEDURE — 0 IOPAMIDOL PER 1 ML: Performed by: EMERGENCY MEDICINE

## 2019-07-27 PROCEDURE — 80053 COMPREHEN METABOLIC PANEL: CPT | Performed by: EMERGENCY MEDICINE

## 2019-07-27 PROCEDURE — 93010 ELECTROCARDIOGRAM REPORT: CPT | Performed by: INTERNAL MEDICINE

## 2019-07-27 PROCEDURE — 71275 CT ANGIOGRAPHY CHEST: CPT

## 2019-07-27 PROCEDURE — 83880 ASSAY OF NATRIURETIC PEPTIDE: CPT | Performed by: EMERGENCY MEDICINE

## 2019-07-27 PROCEDURE — 74177 CT ABD & PELVIS W/CONTRAST: CPT

## 2019-07-27 PROCEDURE — 85025 COMPLETE CBC W/AUTO DIFF WBC: CPT | Performed by: EMERGENCY MEDICINE

## 2019-07-27 PROCEDURE — 84484 ASSAY OF TROPONIN QUANT: CPT | Performed by: EMERGENCY MEDICINE

## 2019-07-27 PROCEDURE — 93005 ELECTROCARDIOGRAM TRACING: CPT

## 2019-07-27 PROCEDURE — 83690 ASSAY OF LIPASE: CPT | Performed by: EMERGENCY MEDICINE

## 2019-07-27 RX ORDER — SODIUM CHLORIDE 0.9 % (FLUSH) 0.9 %
10 SYRINGE (ML) INJECTION AS NEEDED
Status: DISCONTINUED | OUTPATIENT
Start: 2019-07-27 | End: 2019-07-28 | Stop reason: HOSPADM

## 2019-07-27 RX ADMIN — IOPAMIDOL 93 ML: 755 INJECTION, SOLUTION INTRAVENOUS at 23:48

## 2019-07-28 VITALS
BODY MASS INDEX: 27.5 KG/M2 | HEART RATE: 101 BPM | OXYGEN SATURATION: 96 % | TEMPERATURE: 98.5 F | WEIGHT: 203 LBS | DIASTOLIC BLOOD PRESSURE: 82 MMHG | SYSTOLIC BLOOD PRESSURE: 128 MMHG | HEIGHT: 72 IN | RESPIRATION RATE: 16 BRPM

## 2019-08-25 ENCOUNTER — HOSPITAL ENCOUNTER (EMERGENCY)
Facility: HOSPITAL | Age: 54
Discharge: HOME | End: 2019-08-25
Attending: FAMILY MEDICINE | Admitting: FAMILY MEDICINE

## 2019-08-25 VITALS
WEIGHT: 209 LBS | TEMPERATURE: 98.7 F | OXYGEN SATURATION: 97 % | RESPIRATION RATE: 20 BRPM | BODY MASS INDEX: 28.31 KG/M2 | DIASTOLIC BLOOD PRESSURE: 97 MMHG | SYSTOLIC BLOOD PRESSURE: 163 MMHG | HEIGHT: 72 IN | HEART RATE: 71 BPM

## 2019-08-25 DIAGNOSIS — R45.851 SUICIDAL THOUGHTS: Primary | ICD-10-CM

## 2019-08-25 LAB
ALBUMIN SERPL-MCNC: 4.7 G/DL (ref 3.5–5.2)
ALBUMIN/GLOB SERPL: 1.6 G/DL
ALP SERPL-CCNC: 114 U/L (ref 39–117)
ALT SERPL W P-5'-P-CCNC: 25 U/L (ref 1–41)
AMPHET+METHAMPHET UR QL: NEGATIVE
AMPHETAMINES UR QL: NEGATIVE
ANION GAP SERPL CALCULATED.3IONS-SCNC: 13 MMOL/L (ref 5–15)
APAP SERPL-MCNC: <5 MCG/ML (ref 10–30)
AST SERPL-CCNC: 27 U/L (ref 1–40)
BARBITURATES UR QL SCN: POSITIVE
BASOPHILS # BLD AUTO: 0.02 10*3/MM3 (ref 0–0.2)
BASOPHILS NFR BLD AUTO: 0.2 % (ref 0–1.5)
BENZODIAZ UR QL SCN: NEGATIVE
BILIRUB SERPL-MCNC: 1.2 MG/DL (ref 0.2–1.2)
BUN BLD-MCNC: 11 MG/DL (ref 6–20)
BUN/CREAT SERPL: 12.8 (ref 7–25)
BUPRENORPHINE SERPL-MCNC: NEGATIVE NG/ML
CALCIUM SPEC-SCNC: 11.3 MG/DL (ref 8.6–10.5)
CANNABINOIDS SERPL QL: NEGATIVE
CHLORIDE SERPL-SCNC: 100 MMOL/L (ref 98–107)
CO2 SERPL-SCNC: 23 MMOL/L (ref 22–29)
COCAINE UR QL: NEGATIVE
CREAT BLD-MCNC: 0.86 MG/DL (ref 0.76–1.27)
DEPRECATED RDW RBC AUTO: 39.9 FL (ref 37–54)
EOSINOPHIL # BLD AUTO: 0.04 10*3/MM3 (ref 0–0.4)
EOSINOPHIL NFR BLD AUTO: 0.4 % (ref 0.3–6.2)
ERYTHROCYTE [DISTWIDTH] IN BLOOD BY AUTOMATED COUNT: 12.8 % (ref 12.3–15.4)
ETHANOL BLD-MCNC: <10 MG/DL (ref 0–10)
ETHANOL UR QL: <0.01 %
GFR SERPL CREATININE-BSD FRML MDRD: 93 ML/MIN/1.73
GLOBULIN UR ELPH-MCNC: 2.9 GM/DL
GLUCOSE BLD-MCNC: 118 MG/DL (ref 65–99)
HCT VFR BLD AUTO: 47.2 % (ref 37.5–51)
HGB BLD-MCNC: 16.9 G/DL (ref 13–17.7)
HOLD SPECIMEN: NORMAL
HOLD SPECIMEN: NORMAL
IMM GRANULOCYTES # BLD AUTO: 0.04 10*3/MM3 (ref 0–0.05)
IMM GRANULOCYTES NFR BLD AUTO: 0.4 % (ref 0–0.5)
LYMPHOCYTES # BLD AUTO: 1.83 10*3/MM3 (ref 0.7–3.1)
LYMPHOCYTES NFR BLD AUTO: 17.9 % (ref 19.6–45.3)
MCH RBC QN AUTO: 31.1 PG (ref 26.6–33)
MCHC RBC AUTO-ENTMCNC: 35.8 G/DL (ref 31.5–35.7)
MCV RBC AUTO: 86.8 FL (ref 79–97)
METHADONE UR QL SCN: NEGATIVE
MONOCYTES # BLD AUTO: 0.8 10*3/MM3 (ref 0.1–0.9)
MONOCYTES NFR BLD AUTO: 7.8 % (ref 5–12)
NEUTROPHILS # BLD AUTO: 7.52 10*3/MM3 (ref 1.7–7)
NEUTROPHILS NFR BLD AUTO: 73.3 % (ref 42.7–76)
NRBC BLD AUTO-RTO: 0 /100 WBC (ref 0–0.2)
OPIATES UR QL: NEGATIVE
OXYCODONE UR QL SCN: NEGATIVE
PCP UR QL SCN: NEGATIVE
PLATELET # BLD AUTO: 178 10*3/MM3 (ref 140–450)
PMV BLD AUTO: 10.4 FL (ref 6–12)
POTASSIUM BLD-SCNC: 3.5 MMOL/L (ref 3.5–5.2)
PROPOXYPH UR QL: NEGATIVE
PROT SERPL-MCNC: 7.6 G/DL (ref 6–8.5)
RBC # BLD AUTO: 5.44 10*6/MM3 (ref 4.14–5.8)
SALICYLATES SERPL-MCNC: <0.3 MG/DL
SODIUM BLD-SCNC: 136 MMOL/L (ref 136–145)
TRICYCLICS UR QL SCN: NEGATIVE
WBC NRBC COR # BLD: 10.25 10*3/MM3 (ref 3.4–10.8)
WHOLE BLOOD HOLD SPECIMEN: NORMAL
WHOLE BLOOD HOLD SPECIMEN: NORMAL

## 2019-08-25 PROCEDURE — 85025 COMPLETE CBC W/AUTO DIFF WBC: CPT | Performed by: FAMILY MEDICINE

## 2019-08-25 PROCEDURE — 99284 EMERGENCY DEPT VISIT MOD MDM: CPT

## 2019-08-25 PROCEDURE — 80053 COMPREHEN METABOLIC PANEL: CPT | Performed by: FAMILY MEDICINE

## 2019-08-25 PROCEDURE — 80307 DRUG TEST PRSMV CHEM ANLYZR: CPT | Performed by: FAMILY MEDICINE

## 2019-08-25 PROCEDURE — 80306 DRUG TEST PRSMV INSTRMNT: CPT | Performed by: FAMILY MEDICINE

## 2019-08-25 RX ORDER — TOPIRAMATE 50 MG/1
50 TABLET, FILM COATED ORAL 2 TIMES DAILY
COMMUNITY

## 2019-08-25 RX ORDER — ALPRAZOLAM 1 MG/1
2 TABLET ORAL 3 TIMES DAILY PRN
Status: ON HOLD | COMMUNITY
End: 2020-05-13

## 2019-08-25 RX ORDER — ATENOLOL 50 MG/1
50 TABLET ORAL DAILY
COMMUNITY

## 2019-08-25 RX ORDER — PRIMIDONE 50 MG/1
50 TABLET ORAL NIGHTLY
COMMUNITY

## 2019-08-25 RX ORDER — MIRTAZAPINE 15 MG/1
15 TABLET, FILM COATED ORAL NIGHTLY
Status: ON HOLD | COMMUNITY
End: 2020-05-13

## 2019-08-25 RX ORDER — SODIUM CHLORIDE 0.9 % (FLUSH) 0.9 %
10 SYRINGE (ML) INJECTION AS NEEDED
Status: DISCONTINUED | OUTPATIENT
Start: 2019-08-25 | End: 2019-08-26 | Stop reason: HOSPADM

## 2019-08-25 RX ORDER — RIVASTIGMINE TARTRATE 6 MG/1
6 CAPSULE ORAL 2 TIMES DAILY
Status: ON HOLD | COMMUNITY
End: 2020-05-13

## 2019-08-25 RX ORDER — CITALOPRAM 40 MG/1
40 TABLET ORAL DAILY
Status: ON HOLD | COMMUNITY
End: 2020-05-13

## 2019-08-25 RX ORDER — NAPROXEN 500 MG/1
500 TABLET ORAL 2 TIMES DAILY WITH MEALS
Status: ON HOLD | COMMUNITY
End: 2020-05-13

## 2019-08-25 RX ORDER — OMEPRAZOLE 40 MG/1
40 CAPSULE, DELAYED RELEASE ORAL DAILY
COMMUNITY

## 2019-08-25 RX ORDER — DOXAZOSIN 2 MG/1
2 TABLET ORAL NIGHTLY
COMMUNITY

## 2019-08-25 RX ORDER — LOSARTAN POTASSIUM 50 MG/1
100 TABLET ORAL DAILY
COMMUNITY

## 2019-08-25 RX ORDER — ZOLPIDEM TARTRATE 10 MG/1
10 TABLET ORAL NIGHTLY PRN
Status: ON HOLD | COMMUNITY
End: 2020-05-13

## 2019-08-25 RX ORDER — BUSPIRONE HYDROCHLORIDE 10 MG/1
10 TABLET ORAL 2 TIMES DAILY
Status: ON HOLD | COMMUNITY
End: 2020-05-13 | Stop reason: SINTOL

## 2020-05-08 NOTE — PLAN OF CARE
Problem: Patient Care Overview  Goal: Plan of Care Review  Outcome: Ongoing (interventions implemented as appropriate)    Goal: Individualization and Mutuality  Outcome: Ongoing (interventions implemented as appropriate)    Goal: Discharge Needs Assessment  Outcome: Ongoing (interventions implemented as appropriate)    Goal: Interprofessional Rounds/Family Conf  Outcome: Ongoing (interventions implemented as appropriate)      Problem: Overarching Goals (Adult)  Goal: Adheres to Safety Considerations for Self and Others  Outcome: Ongoing (interventions implemented as appropriate)    Goal: Optimized Coping Skills in Response to Life Stressors  Outcome: Ongoing (interventions implemented as appropriate)    Goal: Develops/Participates in Therapeutic [place] to Support Successful Transition  Outcome: Ongoing (interventions implemented as appropriate)      Problem: Fall Risk (Adult)  Goal: Absence of Fall  Outcome: Ongoing (interventions implemented as appropriate)      Problem: Cognitive Impairment (Dementia Signs/Symptoms) (Adult)  Goal: Optimized Cognitive Function (Dementia Signs/Symptoms)  Outcome: Ongoing (interventions implemented as appropriate)      Problem: Psychological Impairment (Dementia Signs/Symptoms) (Adult)  Goal: Improved Psychological Symptoms (Dementia Signs/Symptoms)  Outcome: Ongoing (interventions implemented as appropriate)      Problem: Social/Functional Impairment (Dementia Signs/Symptoms) (Adult)  Goal: Improved Social/Functional Skills/Ability (Dementia Signs/Symptoms)  Outcome: Ongoing (interventions implemented as appropriate)      Problem: Suicidal Behavior (Adult)  Goal: Suicidal Behavior is Absent/Minimized/Managed  Outcome: Ongoing (interventions implemented as appropriate)         [facility] [place] Progress Note    Subject: Pastor Garibay auscultation for evaluation of toe ulcerations.  Medical record reviewed.  Dr. Flores had evaluated patient in vascular surgery clinic in June 2018, non-Hodgkin's lymphoma, stage III-4, follicular, chemotherapy with subsequent good response, follow-up in October 2016 demonstrated no progression of malignancy.  Subsequently had a recurrence in October 2017 and underwent thoracotomy for surgical management of a primary lesion in the lung, scanning in February 2018 demonstrated improvement of the lymphoma though a new inferior vena caval thrombus that was nonocclusive that extended to the right iliac vein.  He was placed on anticoagulation.  He has adult onset diabetes, on insulin since 1991, no history of tobacco utilization, hypertension, hyperlipidemia, sleep apnea, history of right jugular Port-A-Cath placement, port has been removed.  Remains on anticoagulants.  He has a scan for follow-up of his lymphoma in June, he is having increased gagging us and coughing with eating, have suggested he contact his oncologist to consider moving up the scan given his 3 weeks of increasing symptoms.  He denies shortness of breath.  He had imaging through [facility] that demonstrates potential osteomyelitis of the left second and third toe.  He denies history of myocardial infarction or cerebrovascular accident.  He continues to work part-time in the [employer] industry.  His primary care physician is at a [facility] practice.  The complete review of systems otherwise negative.    PMH:   Past Medical History:   Diagnosis Date     Abnormal liver function test      CPAP (continuous positive airway pressure) dependence      Diabetes (H)      Hx of skin cancer, basal cell      Hyperlipidemia      Hypertension      Keratoderma      Lymphoma (H)      Non-Hodgkin lymphoma (H)      Pedal edema     CHRONIC     Pleural effusion      Seborrheic keratosis,  inflamed      Sleep apnea      Thrombosis Felbruary 2018     Patient Active Problem List   Diagnosis     Pain in joint, ankle and foot     Lymphadenopathy, mediastinal     Social Hx:   Social History     Socioeconomic History     Marital status:      Spouse name: Not on file     Number of children: Not on file     Years of education: Not on file     Highest education level: Not on file   Occupational History     Not on file   Social Needs     Financial resource strain: Not on file     Food insecurity     Worry: Not on file     Inability: Not on file     Transportation needs     Medical: Not on file     Non-medical: Not on file   Tobacco Use     Smoking status: Never Smoker     Smokeless tobacco: Never Used   Substance and Sexual Activity     Alcohol use: No     Drug use: No     Sexual activity: Not on file   Lifestyle     Physical activity     Days per week: Not on file     Minutes per session: Not on file     Stress: Not on file   Relationships     Social connections     Talks on phone: Not on file     Gets together: Not on file     Attends Tenriism service: Not on file     Active member of club or organization: Not on file     Attends meetings of clubs or organizations: Not on file     Relationship status: Not on file     Intimate partner violence     Fear of current or ex partner: Not on file     Emotionally abused: Not on file     Physically abused: Not on file     Forced sexual activity: Not on file   Other Topics Concern     Parent/sibling w/ CABG, MI or angioplasty before 65F 55M? Not Asked   Social History Narrative     Not on file       Surgical Hx:   Past Surgical History:   Procedure Laterality Date     BIOPSY LYMPH NODE CERVICAL N/A 12/5/2014    Procedure: BIOPSY LYMPH NODE CERVICAL;  Surgeon: Robbie Linda MD;  Location:  OR     BRONCHOSCOPY FLEXIBLE N/A 11/14/2017    Procedure: BRONCHOSCOPY FLEXIBLE;  FLEXIBLE BRONCHOSCOPY WITH BIOPSIES.;  Surgeon: Robbie Linda MD;   Location:  OR     COLONOSCOPY       COLONOSCOPY N/A 5/9/2018    Procedure: COMBINED COLONOSCOPY, SINGLE OR MULTIPLE BIOPSY/POLYPECTOMY BY BIOPSY;;  Surgeon: Ramos Bates MD;  Location:  GI     ENDOVASCULAR PLACEMENT VASCULAR DEVICE Left 12/5/2017    Procedure: ENDOVASCULAR PLACEMENT VASCULAR DEVICE;;  Surgeon: Robbie Linda MD;  Location: [facility]     ENT SURGERY      tonsillectomy     EXCISE NODE MEDIASTINAL N/A 11/17/2017    Procedure: EXCISE NODE MEDIASTINAL;;  Surgeon: Robbie Linda MD;  Location:  OR     EYE SURGERY       INSERT PORT VASCULAR ACCESS N/A 12/5/2014    Procedure: INSERT PORT VASCULAR ACCESS;  Surgeon: Robbie Linda MD;  Location:  OR     INSERT PORT VASCULAR ACCESS N/A 12/5/2017    Procedure: INSERT PORT VASCULAR ACCESS;  POWER PORT PLACEMENT ATTEMPTED, PLACEMENT OF ANGIO-SEAL VIP VASCULAR CLOSURE DEVICE;  Surgeon: Robbie Linda MD;  Location: [facility]     IR PORT REMOVAL RIGHT  12/10/2018     PHACOEMULSIFICATION CLEAR CORNEA WITH STANDARD INTRAOCULAR LENS IMPLANT Right 5/2/2016    Procedure: PHACOEMULSIFICATION CLEAR CORNEA WITH STANDARD INTRAOCULAR LENS IMPLANT;  Surgeon: Rasheed Finney MD;  Location:  EC     REMOVE PORT VASCULAR ACCESS N/A 3/14/2017    Procedure: REMOVE PORT VASCULAR ACCESS;  Surgeon: Robbie Linda MD;  Location:  OR     SOFT TISSUE SURGERY      BASAL CELL CA FOREHEAD     THORACOTOMY Right 11/17/2017    Procedure: THORACOTOMY;  RIGHT EXPLORATORY THORACOTOMY/ EXTENSIVE PNEUMOLYSIS/ BIOPSY OF INTERLOBAR LYMPH NODE;  Surgeon: Robbie Linda MD;  Location:  OR       Allergies:  No Known Allergies    Medications:   Current Outpatient Medications   Medication     AMLODIPINE BESYLATE PO     Docusate Sodium (COLACE PO)     Fenofibrate (TRICOR PO)     Ibuprofen-Diphenhydramine Cit (ADVIL PM PO)     insulin aspart (NOVOLOG FLEXPEN) 100 UNIT/ML pen     insulin glargine (LANTUS VIAL) 100 UNIT/ML soln      LISINOPRIL PO     METFORMIN HCL PO     METFORMIN HCL PO     Potassium Chloride Vicki ER (K-DUR PO)     Rosuvastatin Calcium (CRESTOR PO)     XARELTO ANTICOAGULANT 20 MG TABS tablet     No current facility-administered medications for this encounter.        Labs:   Recent Labs   Lab Test 12/10/18  1015   HGB 12.1*   INR 0.99   WBC 5.6     Creatinine   Date Value Ref Range Status   11/18/2017 0.86 0.66 - 1.25 mg/dL Final     GFR Estimate   Date Value Ref Range Status   11/18/2017 88 >60 mL/min/1.7m2 Final     Comment:     Non  GFR Calc     GFR Estimate If Black   Date Value Ref Range Status   11/18/2017 >90 >60 mL/min/1.7m2 Final     Comment:      GFR Calc     Lab Results   Component Value Date    WBC 5.6 12/10/2018     Lab Results   Component Value Date    RBC 4.02 12/10/2018     Lab Results   Component Value Date    HGB 12.1 12/10/2018     Lab Results   Component Value Date    HCT 36.1 12/10/2018     No components found for: MCT  Lab Results   Component Value Date    MCV 90 12/10/2018     Lab Results   Component Value Date    MCH 30.1 12/10/2018     Lab Results   Component Value Date    MCHC 33.5 12/10/2018     Lab Results   Component Value Date    RDW 14.2 12/10/2018     Lab Results   Component Value Date     12/10/2018          Nutrition requirements were discussed with patient today.  Objective:  /52   Pulse 97   Temp 97.1  F (36.2  C) (Temporal)   Resp 18   Wound (used by OP [facility] only) 05/08/20 0738 Left 2 toe diabetic/neurophathic (Active)   Length (cm) 0.9 05/08/20 0700   Width (cm) 0.6 05/08/20 0700   Depth (cm) 0.2 05/08/20 0700   Wound (cm^2) 0.54 cm^2 05/08/20 0700   Wound Volume (cm^3) 0.11 cm^3 05/08/20 0700   Dressing Appearance moist drainage 05/08/20 0700   Drainage Characteristics/Odor serosanguineous 05/08/20 0700   Drainage Amount moderate 05/08/20 0700       Wound (used by OP [facility] only) 05/08/20 0739 Left 3 toe diabetic/neurophathic (Active)   Length  (cm) 0.7 05/08/20 0700   Width (cm) 0.5 05/08/20 0700   Depth (cm) 0.2 05/08/20 0700   Wound (cm^2) 0.35 cm^2 05/08/20 0700   Wound Volume (cm^3) 0.07 cm^3 05/08/20 0700   Dressing Appearance moist drainage 05/08/20 0700   Drainage Characteristics/Odor serosanguineous 05/08/20 0700   Drainage Amount moderate 05/08/20 0700        General:  Patient is alert and orientated, no acute distress.  Conversant.  Abdomen obese.  Palpable popliteal pulses bilaterally though decreased right and left dorsalis pedis and posterior tibial pulses.  Erythema involving the wounds on the dorsum of the left second and third toe, small amount of eschar.  Absent great toenails bilaterally.  Decreased intrinsic musculature of the right and left feet, claw toes present.  No ulcerations on the plantar surface of the foot or heel ulcerations.    Report from [facility] imaging demonstrates potential for myelitis of the left second and third toe based on MRI imaging.        Impression: Potential osteomyelitis left second and third toe with tibial peripheral arterial disease on physical examination, history of non-Hodgkin's lymphoma, scheduled for follow-up June 2020  Barriers to healing include: Insulin-dependent diabetes, history of malignancy, probable peripheral arterial disease, osteomyelitis left second and third toe    Plan:  We will dress the wounds with Betadine paint twice daily, dry dressing, pain ankle-brachial indices and duplex artery imaging, vascular surgery consult.  Begin Kyle 1 packet twice daily, vitamin D 10,000 units daily.  Advised follow-up with oncologist for earlier imaging giving GI symptoms.  Discussed the likely requirement for left second and third toe amputations though determination of extent of peripheral arterial disease indicated first.       Hubert Carpenter MD on 5/8/2020 at 7:40 AM

## 2020-05-11 ENCOUNTER — APPOINTMENT (OUTPATIENT)
Dept: CT IMAGING | Facility: HOSPITAL | Age: 55
End: 2020-05-11

## 2020-05-11 ENCOUNTER — HOSPITAL ENCOUNTER (OUTPATIENT)
Facility: HOSPITAL | Age: 55
Setting detail: OBSERVATION
End: 2020-05-13
Attending: EMERGENCY MEDICINE | Admitting: FAMILY MEDICINE

## 2020-05-11 ENCOUNTER — APPOINTMENT (OUTPATIENT)
Dept: GENERAL RADIOLOGY | Facility: HOSPITAL | Age: 55
End: 2020-05-11

## 2020-05-11 DIAGNOSIS — F10.929 ALCOHOLIC INTOXICATION WITH COMPLICATION (HCC): Primary | ICD-10-CM

## 2020-05-11 DIAGNOSIS — R45.851 SUICIDAL IDEATION: ICD-10-CM

## 2020-05-11 PROBLEM — E66.09 CLASS 1 OBESITY DUE TO EXCESS CALORIES WITH SERIOUS COMORBIDITY AND BODY MASS INDEX (BMI) OF 34.0 TO 34.9 IN ADULT: Status: ACTIVE | Noted: 2020-05-11

## 2020-05-11 PROBLEM — T14.91XA SUICIDE ATTEMPT (HCC): Status: ACTIVE | Noted: 2020-05-11

## 2020-05-11 PROBLEM — E11.9 DIABETES MELLITUS (HCC): Status: ACTIVE | Noted: 2020-05-11

## 2020-05-11 LAB
ALBUMIN SERPL-MCNC: 4.5 G/DL (ref 3.5–5.2)
ALBUMIN/GLOB SERPL: 1.4 G/DL
ALP SERPL-CCNC: 129 U/L (ref 39–117)
ALT SERPL W P-5'-P-CCNC: 47 U/L (ref 1–41)
AMPHET+METHAMPHET UR QL: NEGATIVE
AMPHETAMINES UR QL: NEGATIVE
ANION GAP SERPL CALCULATED.3IONS-SCNC: 13 MMOL/L (ref 5–15)
APAP SERPL-MCNC: <5 MCG/ML (ref 10–30)
AST SERPL-CCNC: 36 U/L (ref 1–40)
BARBITURATES UR QL SCN: NEGATIVE
BASOPHILS # BLD AUTO: 0.02 10*3/MM3 (ref 0–0.2)
BASOPHILS NFR BLD AUTO: 0.2 % (ref 0–1.5)
BENZODIAZ UR QL SCN: POSITIVE
BILIRUB SERPL-MCNC: 0.3 MG/DL (ref 0.2–1.2)
BILIRUB UR QL STRIP: NEGATIVE
BUN BLD-MCNC: 9 MG/DL (ref 6–20)
BUN/CREAT SERPL: 8.7 (ref 7–25)
BUPRENORPHINE SERPL-MCNC: NEGATIVE NG/ML
CALCIUM SPEC-SCNC: 10.4 MG/DL (ref 8.6–10.5)
CANNABINOIDS SERPL QL: NEGATIVE
CHLORIDE SERPL-SCNC: 109 MMOL/L (ref 98–107)
CLARITY UR: CLEAR
CO2 SERPL-SCNC: 24 MMOL/L (ref 22–29)
COCAINE UR QL: NEGATIVE
COLOR UR: YELLOW
CREAT BLD-MCNC: 1.04 MG/DL (ref 0.76–1.27)
DEPRECATED RDW RBC AUTO: 39 FL (ref 37–54)
EOSINOPHIL # BLD AUTO: 0.04 10*3/MM3 (ref 0–0.4)
EOSINOPHIL NFR BLD AUTO: 0.4 % (ref 0.3–6.2)
ERYTHROCYTE [DISTWIDTH] IN BLOOD BY AUTOMATED COUNT: 11.9 % (ref 12.3–15.4)
ETHANOL BLD-MCNC: 131 MG/DL (ref 0–10)
ETHANOL BLD-MCNC: 255 MG/DL (ref 0–10)
ETHANOL BLD-MCNC: 36 MG/DL (ref 0–10)
ETHANOL UR QL: 0.04 %
ETHANOL UR QL: 0.13 %
ETHANOL UR QL: 0.26 %
GFR SERPL CREATININE-BSD FRML MDRD: 74 ML/MIN/1.73
GLOBULIN UR ELPH-MCNC: 3.2 GM/DL
GLUCOSE BLD-MCNC: 114 MG/DL (ref 65–99)
GLUCOSE BLDC GLUCOMTR-MCNC: 136 MG/DL (ref 70–130)
GLUCOSE UR STRIP-MCNC: NEGATIVE MG/DL
HBA1C MFR BLD: 5.3 % (ref 4.8–5.6)
HCT VFR BLD AUTO: 52.6 % (ref 37.5–51)
HGB BLD-MCNC: 18.4 G/DL (ref 13–17.7)
HGB UR QL STRIP.AUTO: NEGATIVE
HOLD SPECIMEN: NORMAL
IMM GRANULOCYTES # BLD AUTO: 0.06 10*3/MM3 (ref 0–0.05)
IMM GRANULOCYTES NFR BLD AUTO: 0.6 % (ref 0–0.5)
KETONES UR QL STRIP: ABNORMAL
LEUKOCYTE ESTERASE UR QL STRIP.AUTO: NEGATIVE
LIPASE SERPL-CCNC: 63 U/L (ref 13–60)
LYMPHOCYTES # BLD AUTO: 1.72 10*3/MM3 (ref 0.7–3.1)
LYMPHOCYTES NFR BLD AUTO: 17.1 % (ref 19.6–45.3)
MCH RBC QN AUTO: 31.7 PG (ref 26.6–33)
MCHC RBC AUTO-ENTMCNC: 35 G/DL (ref 31.5–35.7)
MCV RBC AUTO: 90.5 FL (ref 79–97)
METHADONE UR QL SCN: NEGATIVE
MONOCYTES # BLD AUTO: 0.34 10*3/MM3 (ref 0.1–0.9)
MONOCYTES NFR BLD AUTO: 3.4 % (ref 5–12)
NEUTROPHILS # BLD AUTO: 7.9 10*3/MM3 (ref 1.7–7)
NEUTROPHILS NFR BLD AUTO: 78.3 % (ref 42.7–76)
NITRITE UR QL STRIP: NEGATIVE
NRBC BLD AUTO-RTO: 0 /100 WBC (ref 0–0.2)
OPIATES UR QL: NEGATIVE
OXYCODONE UR QL SCN: NEGATIVE
PCP UR QL SCN: NEGATIVE
PH UR STRIP.AUTO: <=5 [PH] (ref 5–9)
PLATELET # BLD AUTO: 187 10*3/MM3 (ref 140–450)
PMV BLD AUTO: 10.3 FL (ref 6–12)
POTASSIUM BLD-SCNC: 3.7 MMOL/L (ref 3.5–5.2)
PROPOXYPH UR QL: NEGATIVE
PROT SERPL-MCNC: 7.7 G/DL (ref 6–8.5)
PROT UR QL STRIP: NEGATIVE
RBC # BLD AUTO: 5.81 10*6/MM3 (ref 4.14–5.8)
SALICYLATES SERPL-MCNC: <0.3 MG/DL
SODIUM BLD-SCNC: 146 MMOL/L (ref 136–145)
SP GR UR STRIP: 1.02 (ref 1–1.03)
TRICYCLICS UR QL SCN: NEGATIVE
UROBILINOGEN UR QL STRIP: ABNORMAL
WBC NRBC COR # BLD: 10.08 10*3/MM3 (ref 3.4–10.8)
WHOLE BLOOD HOLD SPECIMEN: NORMAL

## 2020-05-11 PROCEDURE — 99214 OFFICE O/P EST MOD 30 MIN: CPT | Performed by: NURSE PRACTITIONER

## 2020-05-11 PROCEDURE — 80053 COMPREHEN METABOLIC PANEL: CPT | Performed by: EMERGENCY MEDICINE

## 2020-05-11 PROCEDURE — G0378 HOSPITAL OBSERVATION PER HR: HCPCS

## 2020-05-11 PROCEDURE — 96372 THER/PROPH/DIAG INJ SC/IM: CPT

## 2020-05-11 PROCEDURE — 73564 X-RAY EXAM KNEE 4 OR MORE: CPT

## 2020-05-11 PROCEDURE — 80307 DRUG TEST PRSMV CHEM ANLYZR: CPT | Performed by: EMERGENCY MEDICINE

## 2020-05-11 PROCEDURE — 73030 X-RAY EXAM OF SHOULDER: CPT

## 2020-05-11 PROCEDURE — 93010 ELECTROCARDIOGRAM REPORT: CPT | Performed by: INTERNAL MEDICINE

## 2020-05-11 PROCEDURE — 82962 GLUCOSE BLOOD TEST: CPT

## 2020-05-11 PROCEDURE — 85025 COMPLETE CBC W/AUTO DIFF WBC: CPT | Performed by: EMERGENCY MEDICINE

## 2020-05-11 PROCEDURE — 99220 PR INITIAL OBSERVATION CARE/DAY 70 MINUTES: CPT | Performed by: STUDENT IN AN ORGANIZED HEALTH CARE EDUCATION/TRAINING PROGRAM

## 2020-05-11 PROCEDURE — 96361 HYDRATE IV INFUSION ADD-ON: CPT

## 2020-05-11 PROCEDURE — 25010000002 ENOXAPARIN PER 10 MG: Performed by: STUDENT IN AN ORGANIZED HEALTH CARE EDUCATION/TRAINING PROGRAM

## 2020-05-11 PROCEDURE — 96360 HYDRATION IV INFUSION INIT: CPT

## 2020-05-11 PROCEDURE — 93005 ELECTROCARDIOGRAM TRACING: CPT | Performed by: EMERGENCY MEDICINE

## 2020-05-11 PROCEDURE — 83036 HEMOGLOBIN GLYCOSYLATED A1C: CPT | Performed by: STUDENT IN AN ORGANIZED HEALTH CARE EDUCATION/TRAINING PROGRAM

## 2020-05-11 PROCEDURE — 99285 EMERGENCY DEPT VISIT HI MDM: CPT

## 2020-05-11 PROCEDURE — 81003 URINALYSIS AUTO W/O SCOPE: CPT | Performed by: STUDENT IN AN ORGANIZED HEALTH CARE EDUCATION/TRAINING PROGRAM

## 2020-05-11 PROCEDURE — 70450 CT HEAD/BRAIN W/O DYE: CPT

## 2020-05-11 PROCEDURE — 25010000002 ZIPRASIDONE MESYLATE PER 10 MG

## 2020-05-11 PROCEDURE — 83690 ASSAY OF LIPASE: CPT | Performed by: EMERGENCY MEDICINE

## 2020-05-11 RX ORDER — ONDANSETRON 2 MG/ML
4 INJECTION INTRAMUSCULAR; INTRAVENOUS EVERY 6 HOURS PRN
Status: DISCONTINUED | OUTPATIENT
Start: 2020-05-11 | End: 2020-05-13 | Stop reason: HOSPADM

## 2020-05-11 RX ORDER — PANTOPRAZOLE SODIUM 40 MG/1
40 TABLET, DELAYED RELEASE ORAL DAILY
Status: DISCONTINUED | OUTPATIENT
Start: 2020-05-11 | End: 2020-05-13 | Stop reason: HOSPADM

## 2020-05-11 RX ORDER — DULOXETIN HYDROCHLORIDE 60 MG/1
60 CAPSULE, DELAYED RELEASE ORAL DAILY
Status: DISCONTINUED | OUTPATIENT
Start: 2020-05-12 | End: 2020-05-13 | Stop reason: HOSPADM

## 2020-05-11 RX ORDER — SODIUM CHLORIDE 450 MG/100ML
125 INJECTION, SOLUTION INTRAVENOUS CONTINUOUS
Status: DISCONTINUED | OUTPATIENT
Start: 2020-05-11 | End: 2020-05-13 | Stop reason: HOSPADM

## 2020-05-11 RX ORDER — DEXTROSE MONOHYDRATE 25 G/50ML
25 INJECTION, SOLUTION INTRAVENOUS
Status: DISCONTINUED | OUTPATIENT
Start: 2020-05-11 | End: 2020-05-13 | Stop reason: HOSPADM

## 2020-05-11 RX ORDER — CITALOPRAM 40 MG/1
40 TABLET ORAL DAILY
Status: DISCONTINUED | OUTPATIENT
Start: 2020-05-11 | End: 2020-05-11

## 2020-05-11 RX ORDER — HYDROCHLOROTHIAZIDE 25 MG/1
25 TABLET ORAL DAILY
Status: DISCONTINUED | OUTPATIENT
Start: 2020-05-11 | End: 2020-05-13 | Stop reason: HOSPADM

## 2020-05-11 RX ORDER — METOPROLOL SUCCINATE 50 MG/1
50 TABLET, EXTENDED RELEASE ORAL DAILY
Status: DISCONTINUED | OUTPATIENT
Start: 2020-05-11 | End: 2020-05-13 | Stop reason: HOSPADM

## 2020-05-11 RX ORDER — ACETAMINOPHEN 325 MG/1
650 TABLET ORAL EVERY 6 HOURS PRN
Status: DISCONTINUED | OUTPATIENT
Start: 2020-05-11 | End: 2020-05-13 | Stop reason: HOSPADM

## 2020-05-11 RX ORDER — ZIPRASIDONE MESYLATE 20 MG/ML
10 INJECTION, POWDER, LYOPHILIZED, FOR SOLUTION INTRAMUSCULAR ONCE
Status: COMPLETED | OUTPATIENT
Start: 2020-05-11 | End: 2020-05-11

## 2020-05-11 RX ORDER — SODIUM CHLORIDE 0.9 % (FLUSH) 0.9 %
10 SYRINGE (ML) INJECTION AS NEEDED
Status: DISCONTINUED | OUTPATIENT
Start: 2020-05-11 | End: 2020-05-13 | Stop reason: HOSPADM

## 2020-05-11 RX ORDER — ATENOLOL 50 MG/1
50 TABLET ORAL DAILY
Status: DISCONTINUED | OUTPATIENT
Start: 2020-05-11 | End: 2020-05-11

## 2020-05-11 RX ORDER — ZIPRASIDONE MESYLATE 20 MG/ML
INJECTION, POWDER, LYOPHILIZED, FOR SOLUTION INTRAMUSCULAR
Status: COMPLETED
Start: 2020-05-11 | End: 2020-05-11

## 2020-05-11 RX ORDER — NICOTINE POLACRILEX 4 MG
15 LOZENGE BUCCAL
Status: DISCONTINUED | OUTPATIENT
Start: 2020-05-11 | End: 2020-05-13 | Stop reason: HOSPADM

## 2020-05-11 RX ORDER — TERAZOSIN 2 MG/1
2 CAPSULE ORAL EVERY 12 HOURS SCHEDULED
Status: DISCONTINUED | OUTPATIENT
Start: 2020-05-11 | End: 2020-05-13 | Stop reason: HOSPADM

## 2020-05-11 RX ORDER — CLONAZEPAM 0.5 MG/1
1 TABLET ORAL 2 TIMES DAILY
Status: DISCONTINUED | OUTPATIENT
Start: 2020-05-11 | End: 2020-05-12

## 2020-05-11 RX ORDER — ALUMINA, MAGNESIA, AND SIMETHICONE 2400; 2400; 240 MG/30ML; MG/30ML; MG/30ML
15 SUSPENSION ORAL EVERY 6 HOURS PRN
Status: DISCONTINUED | OUTPATIENT
Start: 2020-05-11 | End: 2020-05-13 | Stop reason: HOSPADM

## 2020-05-11 RX ORDER — CLOPIDOGREL BISULFATE 75 MG/1
75 TABLET ORAL DAILY
Status: DISCONTINUED | OUTPATIENT
Start: 2020-05-11 | End: 2020-05-13 | Stop reason: HOSPADM

## 2020-05-11 RX ORDER — SODIUM CHLORIDE 0.9 % (FLUSH) 0.9 %
10 SYRINGE (ML) INJECTION EVERY 12 HOURS SCHEDULED
Status: DISCONTINUED | OUTPATIENT
Start: 2020-05-11 | End: 2020-05-13 | Stop reason: HOSPADM

## 2020-05-11 RX ORDER — LOSARTAN POTASSIUM 50 MG/1
100 TABLET ORAL DAILY
Status: DISCONTINUED | OUTPATIENT
Start: 2020-05-11 | End: 2020-05-13 | Stop reason: HOSPADM

## 2020-05-11 RX ORDER — BUSPIRONE HYDROCHLORIDE 10 MG/1
10 TABLET ORAL 2 TIMES DAILY
Status: DISCONTINUED | OUTPATIENT
Start: 2020-05-11 | End: 2020-05-13 | Stop reason: HOSPADM

## 2020-05-11 RX ORDER — ATORVASTATIN CALCIUM 20 MG/1
20 TABLET, FILM COATED ORAL NIGHTLY
Status: DISCONTINUED | OUTPATIENT
Start: 2020-05-11 | End: 2020-05-13 | Stop reason: HOSPADM

## 2020-05-11 RX ADMIN — TERAZOSIN HYDROCHLORIDE 2 MG: 2 CAPSULE ORAL at 20:20

## 2020-05-11 RX ADMIN — LOSARTAN POTASSIUM 100 MG: 50 TABLET ORAL at 09:11

## 2020-05-11 RX ADMIN — SODIUM CHLORIDE 125 ML/HR: 4.5 INJECTION, SOLUTION INTRAVENOUS at 09:11

## 2020-05-11 RX ADMIN — ZIPRASIDONE MESYLATE 10 MG: 20 INJECTION, POWDER, LYOPHILIZED, FOR SOLUTION INTRAMUSCULAR at 04:40

## 2020-05-11 RX ADMIN — ACETAMINOPHEN 650 MG: 325 TABLET, FILM COATED ORAL at 17:09

## 2020-05-11 RX ADMIN — CITALOPRAM HYDROBROMIDE 40 MG: 40 TABLET ORAL at 09:11

## 2020-05-11 RX ADMIN — METOPROLOL SUCCINATE 50 MG: 50 TABLET, EXTENDED RELEASE ORAL at 09:11

## 2020-05-11 RX ADMIN — BUSPIRONE HYDROCHLORIDE 10 MG: 10 TABLET ORAL at 20:21

## 2020-05-11 RX ADMIN — HYDROCHLOROTHIAZIDE 25 MG: 25 TABLET ORAL at 09:11

## 2020-05-11 RX ADMIN — ENOXAPARIN SODIUM 40 MG: 40 INJECTION SUBCUTANEOUS at 09:11

## 2020-05-11 RX ADMIN — PANTOPRAZOLE SODIUM 40 MG: 40 TABLET, DELAYED RELEASE ORAL at 09:11

## 2020-05-11 RX ADMIN — SODIUM CHLORIDE 125 ML/HR: 4.5 INJECTION, SOLUTION INTRAVENOUS at 17:07

## 2020-05-11 RX ADMIN — ATENOLOL 50 MG: 50 TABLET ORAL at 09:11

## 2020-05-11 RX ADMIN — CLONAZEPAM 1 MG: 0.5 TABLET ORAL at 20:20

## 2020-05-11 RX ADMIN — SODIUM CHLORIDE, PRESERVATIVE FREE 10 ML: 5 INJECTION INTRAVENOUS at 09:12

## 2020-05-11 RX ADMIN — BUSPIRONE HYDROCHLORIDE 10 MG: 10 TABLET ORAL at 09:11

## 2020-05-11 RX ADMIN — CLOPIDOGREL BISULFATE 75 MG: 75 TABLET ORAL at 09:11

## 2020-05-11 RX ADMIN — ATORVASTATIN CALCIUM 20 MG: 20 TABLET, FILM COATED ORAL at 20:21

## 2020-05-11 NOTE — ED NOTES
EMS called reporting pt called a crisis line stating he wanted to kill himself and that he had taken some Xanax and drank 2 5ths of Whiskey. EMS also reports pt is uncooperative, unwilling to wear a mask or keep VS monitors on.      Filippo Gamez RN  05/11/20 3309

## 2020-05-11 NOTE — ED NOTES
Blood cleansed from face. No active bleeding or obvious wounds noted.     Karissa Pardo RN  05/11/20 0519

## 2020-05-11 NOTE — ED NOTES
"[place] [employer] at bedside. Pt more cooperative with care. Continues to cuss and mention knocking people out and \"If I had my gun, I'd show you.\"     Karissa Pardo RN  05/11/20 1430    "

## 2020-05-11 NOTE — PLAN OF CARE
Problem: Suicide Risk (Adult)  Goal: Identify Related Risk Factors and Signs and Symptoms  Outcome: Ongoing (interventions implemented as appropriate)  Goal: Strength-Based Wellness/Recovery  Outcome: Ongoing (interventions implemented as appropriate)  Goal: Physical Safety  Outcome: Ongoing (interventions implemented as appropriate)     Problem: Alcohol Withdrawal Acute, Risk/Actual (Adult)  Goal: Signs and Symptoms of Listed Potential Problems Will be Absent, Minimized or Managed (Alcohol Withdrawal Acute, Risk/Actual)  Outcome: Ongoing (interventions implemented as appropriate)     Problem: Fall Risk (Adult)  Goal: Identify Related Risk Factors and Signs and Symptoms  Outcome: Ongoing (interventions implemented as appropriate)  Goal: Absence of Fall  Outcome: Ongoing (interventions implemented as appropriate)     Problem: Patient Care Overview  Goal: Plan of Care Review  Outcome: Ongoing (interventions implemented as appropriate)  Flowsheets (Taken 5/11/2020 1430)  Progress: improving  Plan of Care Reviewed With: patient  Outcome Summary: alcohol level is decreasing; psych consulted; patient tearful with conversation  Goal: Individualization and Mutuality  Outcome: Ongoing (interventions implemented as appropriate)  Goal: Discharge Needs Assessment  Outcome: Ongoing (interventions implemented as appropriate)  Goal: Interprofessional Rounds/Family Conf  Outcome: Ongoing (interventions implemented as appropriate)

## 2020-05-11 NOTE — ED NOTES
Hospitalist at bedside to assess pt and inform of plan for admission. Pt allows MD to do physical assessment, then informs MD he will not be staying in hospital. MD explained to pt that he needs to be sober for BH evaluation. Security at bedside. Pt ripped off monitors, stood up, lunged toward security with fists drawn. Police contacted for assistance.     Karissa Pardo, RN  05/11/20 5513

## 2020-05-11 NOTE — ED NOTES
Belongings secured by hospital security. Pt in yellow gown, yellow socks.     Karissa Pardo, RN  05/11/20 5406

## 2020-05-11 NOTE — PROGRESS NOTES
FAMILY MEDICINE RESIDENCY  TRANSFER OF CARE/ACCEPTANCE NOTE    PATIENT NAME: Nick Abreu  : 1965  MRN: [id number]     Active Hospital Problems    Diagnosis  POA   • **Suicidal ideation [R45.851]  Not Applicable   • Alcoholic intoxication with complication (CMS/HCC) [F10.929]  Yes   • Diabetes mellitus (CMS/HCC) [E11.9]  Yes   • Class 1 obesity due to excess calories with serious comorbidity and body mass index (BMI) of 34.0 to 34.9 in adult [E66.09, Z68.34]  Not Applicable   • Anxiety disorder [F41.9]  Yes   • Major neurocognitive disorder (CMS/HCC) [F01.50]  Yes   • Severe episode of recurrent major depressive disorder, with psychotic features (CMS/HCC) [F33.3]  Yes      Resolved Hospital Problems   No resolved problems to display.       Patient seen and examined by me on 20 at 0630.  Interim History: patient is now sleeping after dose of Geodon.  Patient reports n/a    I have noted the following changes since admission: patient sleeping    I have reviewed the H&P, diagnostic data and plan of care for Nick Abreu, case discussed with the admitting provider and/or FM service team.  I will be taking over care of this patient during the current hospitalization.      Johan Chappell Jr, MD  20  07:34

## 2020-05-11 NOTE — ED NOTES
"Pt points to multiple staff members and says, \"you're a mother-fucker, you're a cock-sucker, you're a bitch, and you look like where's mann.\"     Karissa Pardo, RN  05/11/20 8755    "

## 2020-05-11 NOTE — ED NOTES
Pt has removed/torn 2 surgical mask. Instructed the 3rd time on necessity of mask and shortage of PPE. Pt compliant at this time with mask.     Karissa Pardo RN  05/11/20 0357

## 2020-05-11 NOTE — ED NOTES
"Pt uncooperative with care. Cussing despite being asked to stop. Slinging arms around. Telling staff to look him up on Facebook to show all the \"asses I have kicked.\" [place] PD contacted to come to bedside.     Karissa Pardo, RN  05/11/20 7640    "

## 2020-05-11 NOTE — ED NOTES
Pt attempted to void. Unsuccessful. Refuses in/out catheter. States he knows his rights and he is refusing a tube in his pecker. Will attempt voided urine later. 1:1 staffing continues.     Karissa Pardo RN  05/11/20 0356

## 2020-05-11 NOTE — H&P
HISTORY AND PHYSICAL  NAME: Nick Abreu  : 1965  MRN: [id number]    DATE OF ADMISSION:  2020     DATE & TIME SEEN: 20 at 0438    PCP: Kati Garcia MD    CODE STATUS: Full code    CHIEF COMPLAINT:  Suicidal ideation; acute alcohol intoxication    HPI:  Nick Abreu is a 55 y.o. male with a CMH of major neurocognitive disorder, MDD, anxiety, hx of anxiolytic abuse, who presents complaining of suicide attempt.    Patient is acutely intoxicated and a poor historian. Collateral history obtained from ED physician Dr. Granados. Patient had been drinking reportedly 2 bottles of alcohol and taking an unknown amount of ambien. He then called the suicide hotline and his conversation was concerning enough that they had EMS bring him to ED. He also fell on his face and has some crusted blood around nose with CT head not revealing any acute concern.    Denies pain.   No other complaints.      CONCURRENT MEDICAL HISTORY:  Past Medical History:   Diagnosis Date   • Alzheimer disease (CMS/HCC)    • Anxiety    • Back pain, chronic    • Bipolar 1 disorder (CMS/HCC)    • Depression    • Depression    • Diabetes mellitus (CMS/HCC)    • GERD (gastroesophageal reflux disease)    • Head injury    • Hyperlipidemia    • Hypertension    • Injury of back    • Manic depression (CMS/HCC)    • Parkinson disease (CMS/HCC)    • Psychiatric complaint    • Psychosis (CMS/HCC)    • Suicide attempt (CMS/HCC)        PAST SURGICAL HISTORY:  Past Surgical History:   Procedure Laterality Date   • BACK SURGERY     • CHOLECYSTECTOMY     • 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       FAMILY HISTORY:  Family History   Problem Relation Age of Onset   • Bipolar disorder Mother    • Dementia Father         SOCIAL HISTORY:  Social History     Socioeconomic History   • Marital status:      Spouse name: Not on file   • Number of children: Not on file   • Years of education: Not on file   • Highest education  "level: Not on file   Tobacco Use   • Smoking status: Former Smoker     Packs/day: 0.25     Years: 20.00     Pack years: 5.00     Last attempt to quit: 12/15/2019     Years since quittin.4   • Smokeless tobacco: Never Used   Substance and Sexual Activity   • Alcohol use: Yes   • Drug use: No     Comment: patient had RX for xanax   • Sexual activity: Defer   Social History Narrative    Substance Abuse: Alcohol: does not drink,  Cannabis: \"40yrs ago\", Methamphetamine: does not use, Opiate: does not use, Cocaine: does not use, Synthetic: does not use and IV drug use: denies; he has been on high dose benzodiazepine from prescription for years.  These were tapered off at the [facility] last month.  He restarted using xanax once he left the [facility] and went back to see his doctor.    2018: States he began smoking around 1 year ago to stay awake while driving. Only smokes a few cigarettes per day. Often goes several days without smoking.         Marriages: 7    Current Relationships:  but living separately for about 3 yrs    Children: 3        Education: some college     Occupation: on disability; he was a  for 27yrs. 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    Living Situation: he left the [facility] about a week ago and has been staying at a motel.  He plans to get back with his estranged wife and move in with her.       HOME MEDICATIONS:  Prior to Admission medications    Medication Sig Start Date End Date Taking? Authorizing Provider   ALPRAZolam (XANAX) 1 MG tablet Take 1 mg by mouth 4 (Four) Times a Day As Needed for Anxiety.   Yes Av Louise MD   aspirin 81 MG chewable tablet Chew 1 tablet Daily. 19  Yes Gerry Trinidad MD   atenolol (TENORMIN) 50 MG tablet Take 50 mg by mouth Daily.   Yes Av Louise MD   atorvastatin (LIPITOR) 20 MG tablet Take 1 tablet by mouth Every Night. 19  Yes Gerry Trinidad MD   donepezil (ARICEPT) 10 MG tablet Take 1 tablet by mouth " Every Night. 4/9/19  Yes Gerry Trinidad MD   hydrochlorothiazide (HYDRODIURIL) 25 MG tablet Take 1 tablet by mouth Daily. 4/9/19  Yes Gerry Trinidad MD   melatonin 3 MG tablet Take 1 tablet by mouth Every Night. 4/9/19  Yes Gerry Trinidad MD   omeprazole (priLOSEC) 40 MG capsule Take 40 mg by mouth Daily.   Yes Av Louise MD   pantoprazole (PROTONIX) 40 MG EC tablet Take 1 tablet by mouth Daily. 4/9/19  Yes Gerry Trinidad MD   topiramate (TOPAMAX) 50 MG tablet Take 50 mg by mouth 2 (Two) Times a Day.   Yes Av Louise MD   zolpidem (AMBIEN) 10 MG tablet Take 10 mg by mouth At Night As Needed for Sleep.   Yes Av Louise MD   busPIRone (BUSPAR) 10 MG tablet Take 10 mg by mouth 2 (Two) Times a Day.    Av Louise MD   citalopram (CeleXA) 40 MG tablet Take 40 mg by mouth Daily.    Av Louise MD   clonazePAM (KlonoPIN) 1 MG tablet Take 1 tablet by mouth 2 (Two) Times a Day. 4/9/19   Gerry Trinidad MD   clopidogrel (PLAVIX) 75 MG tablet Take 1 tablet by mouth Daily. 4/9/19   Gerry Trinidad MD   doxazosin (CARDURA) 2 MG tablet Take 2 mg by mouth Every Night.    Av Louise MD   DULoxetine (CYMBALTA) 60 MG capsule Take 1 capsule by mouth Daily. 4/9/19   Gerry Trinidad MD   losartan (COZAAR) 50 MG tablet Take 100 mg by mouth Daily.    Av Louise MD   metoprolol succinate XL (TOPROL-XL) 50 MG 24 hr tablet Take 1 tablet by mouth Daily. 4/9/19   Gerry Trinidad MD   mirtazapine (REMERON) 15 MG tablet Take 15 mg by mouth Every Night.    Av Louise MD   naproxen (NAPROSYN) 500 MG tablet Take 500 mg by mouth 2 (Two) Times a Day With Meals.    Av Louise MD   primidone (MYSOLINE) 50 MG tablet Take 50 mg by mouth Every Night.    Av Louise MD   QUEtiapine (SEROquel) 300 MG tablet Take 0.5 tablets by mouth Every Night. 4/9/19   Gerry Trinidad MD   rivastigmine (EXELON) 6 MG capsule  Take 6 mg by mouth 2 (Two) Times a Day.    Provider, MD Av   terazosin (HYTRIN) 2 MG capsule Take 1 capsule by mouth 2 (Two) Times a Day. 4/9/19   Gerry Trinidad MD       ALLERGIES:  Patient has no known allergies.    REVIEW OF SYSTEMS  Review of Systems   Unable to perform ROS: Acuity of condition       PHYSICAL EXAM:  Temp:  [98.4 °F (36.9 °C)] 98.4 °F (36.9 °C)  Heart Rate:  [102-107] 104  Resp:  [18-20] 18  BP: (124-136)/(77-91) 124/89  Body mass index is 34.7 kg/m².  Physical Exam   Constitutional: He appears well-developed and well-nourished. No distress.   HENT:   Head: Normocephalic.   Mouth/Throat: Oropharynx is clear and moist.   Blood crusting around nasal nares   Eyes: Pupils are equal, round, and reactive to light. EOM are normal.   Neck: Normal range of motion. Neck supple.   Cardiovascular: Normal rate, regular rhythm and normal heart sounds.   Pulmonary/Chest: Effort normal and breath sounds normal. He has no wheezes. He has no rales.   Abdominal: Soft. Bowel sounds are normal. There is no tenderness.   Musculoskeletal: Normal range of motion. He exhibits no edema or tenderness.   Neurological: He is alert.   Acutely intoxicated   Skin: Skin is warm. Capillary refill takes less than 2 seconds. He is not diaphoretic.   Psychiatric: He has a normal mood and affect. His behavior is normal.       DIAGNOSTIC DATA:   Lab Results (last 24 hours)     Procedure Component Value Units Date/Time    Extra Tubes [[id number]] Collected:  05/11/20 0320    Specimen:  Blood, Venous Line Updated:  05/11/20 0430    Narrative:       The following orders were created for panel order Extra Tubes.  Procedure                               Abnormality         Status                     ---------                               -----------         ------                     Light Blue Top[[id number]]                                   Final result               Gold Top - [facility][[id number]]                                    Final result                 Please view results for these tests on the individual orders.    Light Blue Top [[id number]] Collected:  05/11/20 0320    Specimen:  Blood Updated:  05/11/20 0430     Extra Tube hold for add-on     Comment: Auto resulted       Gold Top - [facility] [[id number]] Collected:  05/11/20 0320    Specimen:  Blood Updated:  05/11/20 0430     Extra Tube Hold for add-ons.     Comment: Auto resulted.       Comprehensive Metabolic Panel [[id number]]  (Abnormal) Collected:  05/11/20 0316    Specimen:  Blood Updated:  05/11/20 0350     Glucose 114 mg/dL      BUN 9 mg/dL      Creatinine 1.04 mg/dL      Sodium 146 mmol/L      Potassium 3.7 mmol/L      Chloride 109 mmol/L      CO2 24.0 mmol/L      Calcium 10.4 mg/dL      Total Protein 7.7 g/dL      Albumin 4.50 g/dL      ALT (SGPT) 47 U/L      AST (SGOT) 36 U/L      Alkaline Phosphatase 129 U/L      Total Bilirubin 0.3 mg/dL      eGFR Non African Amer 74 mL/min/1.73      Globulin 3.2 gm/dL      A/G Ratio 1.4 g/dL      BUN/Creatinine Ratio 8.7     Anion Gap 13.0 mmol/L     Narrative:       GFR Normal >60  Chronic Kidney Disease <60  Kidney Failure <15      Lipase [[id number]]  (Abnormal) Collected:  05/11/20 0316    Specimen:  Blood Updated:  05/11/20 0350     Lipase 63 U/L     Ethanol [[id number]]  (Abnormal) Collected:  05/11/20 0316    Specimen:  Blood Updated:  05/11/20 0350     Ethanol 255 mg/dL      Ethanol % 0.255 %     Acetaminophen Level [[id number]]  (Abnormal) Collected:  05/11/20 0316    Specimen:  Blood Updated:  05/11/20 0350     Acetaminophen <5.0 mcg/mL     Salicylate Level [[id number]]  (Normal) Collected:  05/11/20 0316    Specimen:  Blood Updated:  05/11/20 0350     Salicylate <0.3 mg/dL     CBC & Differential [[id number]] Collected:  05/11/20 0316    Specimen:  Blood Updated:  05/11/20 0326    Narrative:       The following orders were created for panel order CBC & Differential.  Procedure                               Abnormality         Status                      ---------                               -----------         ------                     CBC Auto Differential[[id number]]        Abnormal            Final result                 Please view results for these tests on the individual orders.    CBC Auto Differential [[id number]]  (Abnormal) Collected:  05/11/20 0316    Specimen:  Blood Updated:  05/11/20 0326     WBC 10.08 10*3/mm3      RBC 5.81 10*6/mm3      Hemoglobin 18.4 g/dL      Hematocrit 52.6 %      MCV 90.5 fL      MCH 31.7 pg      MCHC 35.0 g/dL      RDW 11.9 %      RDW-SD 39.0 fl      MPV 10.3 fL      Platelets 187 10*3/mm3      Neutrophil % 78.3 %      Lymphocyte % 17.1 %      Monocyte % 3.4 %      Eosinophil % 0.4 %      Basophil % 0.2 %      Immature Grans % 0.6 %      Neutrophils, Absolute 7.90 10*3/mm3      Lymphocytes, Absolute 1.72 10*3/mm3      Monocytes, Absolute 0.34 10*3/mm3      Eosinophils, Absolute 0.04 10*3/mm3      Basophils, Absolute 0.02 10*3/mm3      Immature Grans, Absolute 0.06 10*3/mm3      nRBC 0.0 /100 WBC            Imaging Results (Last 24 Hours)     Procedure Component Value Units Date/Time    XR Shoulder 2+ View Right [[id number]] Collected:  05/11/20 0345     Updated:  05/11/20 0411    Narrative:       Right shoulder three view on 5/11/2020    CLINICAL INDICATION: Pain after fall    COMPARISON: 8/23/2016    FINDINGS: There is evidence of calcified granulomatous disease in  the right chest. The AC joint is well aligned. There is an old  healed right distal clavicle diaphysis fracture. The glenohumeral  joint is well located. There are no acute fractures.      Impression:       No acute abnormality.    Electronically signed by:  Harsha Armstrong  5/11/2020 4:10 AM  CDT Workstation: [id number]    XR Knee 4+ View Right [[id number]] Collected:  05/11/20 0344     Updated:  05/11/20 0409    Narrative:       Right knee four view on 5/11/2020    CLINICAL INDICATION: Pain after fall    COMPARISON: None    FINDINGS: There is  calcification of the menisci consistent with  chondrocalcinosis and CPPD. There is intramedullary alfonso  traversing an old healed proximal tibia diaphysis fracture. No  joint effusion is noted. There are no acute fractures. Visualized  joints are well aligned.      Impression:       Chronic findings as above with no acute abnormality.    Electronically signed by:  Harsha Armstrong  5/11/2020 4:08 AM  CDT Workstation: [id number]    CT Head Without Contrast [[id number]] Collected:  05/11/20 0318     Updated:  05/11/20 0355    Narrative:         CT head without contrast on 5/11/2020     CLINICAL INDICATION: Head injury, per protocol for mechanism of  injury    TECHNIQUE: Multiple axial images are obtained throughout the head  without the administration of contrast. This exam was performed  according to our departmental dose-optimization program, which  includes automated exposure control, adjustment of the mA and/or  kV according to patient size and/or use of iterative  reconstruction technique.  Total DLP is 1063.8 mGy*cm.    COMPARISON: 7/27/2019    FINDINGS: There is minimal low-density in the periventricular  white matter consistent with minimal chronic small vessel  ischemic changes. There is no hydrocephalus. There is no CT  evidence of acute infarct. There is no hemorrhage. There are no  abnormal extra-axial fluid collections. There is no mass, mass  effect or midline shift. No bony abnormality is noted.      Impression:       No acute intracranial abnormality.    Electronically signed by:  Harsha Armstrong  5/11/2020 3:53 AM  CDT Workstation: [id number]            I reviewed the patient's new clinical results.    ASSESSMENT AND PLAN: This is a 55 y.o. male with:    Active Hospital Problems    Diagnosis POA   • **Suicidal ideation [R45.851] Not Applicable     Patient called suicide hotline who were concerned enough with his call that they called EMS to take to ED.  - Repeat EtOH until undetectable  - UDS  - psych  consult     • Alcoholic intoxication with complication (CMS/HCC) [F10.929] Yes     Priority: High     Presenting EtOH elevated to 255. Became aggressive with security while in ED requiring IM geodon.   - repeat EtOH until undetectable  - psych consult once no longer intoxicated  - CIWA once no longer acutely intoxicated     • Anxiety disorder [F41.9] Yes     Priority: Medium     Medications list includes SSRI, SNRI, and benzodiazepines. Will restart medicaitons based on last psychiatric hospitalizaton.  - cymbalta 60 mg qd  - seroquel 150 mg qhs  - terazosin 2 mg bid  - klonopin 1 mg bid     • Severe episode of recurrent major depressive disorder, with psychotic features (CMS/HCC) [F33.3] Yes     Priority: Medium     Medication list includes SSRI and SNRI. Will restart medications as listed in last psychiatric hospitalization  - cymbalta 60 mg qd  - seroquel 150 mg qhs  - terazosin 2 mg bid     • Diabetes mellitus (CMS/HCC) [E11.9] Yes     Priority: Low     History of DM. Unknown type. Last A1c 2014 5.4%  - Repeat A1c  - glucose fingersticks ACTID  - SSI  - consider antibody screen to delineate Type 1 vs Type 2     • Major neurocognitive disorder (CMS/HCC) [F01.50] Yes     Priority: Low     Continue donepezil      • Class 1 obesity due to excess calories with serious comorbidity and body mass index (BMI) of 34.0 to 34.9 in adult [E66.09, Z68.34] Not Applicable     Nutrition consult         DVT prophylaxis: Kale Abreu and I have discussed pain goals for this hospitalization after reviewing his current clinical condition, medical history and prior pain experiences.  The goal is to keep the pain level 5/10.  To help achieve this, I plan to tylenol or torodol.    ANASTACIO # [id number], reviewed and consistent with patient reported medications.    Expected Length of Stay: Where: Behavioral Unit and When:  1-2 days    I discussed the patient's findings and my recommendations with patient and primary care  team.     Javier Hayward MD is the attending on record at time of admission, He is aware of the patient's status and agrees with the above history and physical.      Greg Mullen M.D. PGY2  [facility]  [address] [place], [place] [address]  Office: [phone]    This document has been electronically signed by Greg Mullen MD on May 11, 2020 05:15

## 2020-05-11 NOTE — ED NOTES
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     Karissa Pardo, RN  05/11/20 [id number]

## 2020-05-11 NOTE — ED NOTES
Pt calm. Resting in bed with eyes closed. 1:1 staffing continues.     Karissa Pardo RN  05/11/20 [id number]

## 2020-05-11 NOTE — ED NOTES
Per discussion with Elizabeth RN (house supervisor), will hold pt in ED until 0700 due to pt needing sitter.     Karissa Pardo RN  05/11/20 [id number]

## 2020-05-11 NOTE — ED PROVIDER NOTES
Subjective   55-year-old male history of bipolar disorder, diabetes, hypertension, drug and alcohol abuse presents with alcohol and drug intoxication and associated suicidal ideation.  Patient admits to drinking 2 bottles of alcohol and taking some Ambien, he does not know how much.  Denies suicide attempt.  Reports that he has been feeling suicidal.  Reports that he fell and injured his face.          Review of Systems   Constitutional: Negative for chills and fever.   HENT: Negative for rhinorrhea, sore throat and voice change.    Eyes: Negative for pain and redness.   Respiratory: Negative for cough, shortness of breath and wheezing.    Cardiovascular: Negative for chest pain and palpitations.   Gastrointestinal: Negative for abdominal pain, constipation, diarrhea, nausea and vomiting.   Genitourinary: Negative for dysuria and hematuria.   Musculoskeletal: Negative for joint swelling and myalgias.   Skin: Negative for pallor and rash.   Neurological: Negative for dizziness, syncope, weakness and headaches.   Psychiatric/Behavioral: Positive for suicidal ideas.       Past Medical History:   Diagnosis Date   • Alzheimer disease (CMS/HCC)    • Anxiety    • Back pain, chronic    • Bipolar 1 disorder (CMS/HCC)    • Depression    • Depression    • Diabetes mellitus (CMS/HCC)    • GERD (gastroesophageal reflux disease)    • Head injury    • Hyperlipidemia    • Hypertension    • Injury of back    • Manic depression (CMS/HCC)    • Parkinson disease (CMS/HCC)    • Psychiatric complaint    • Psychosis (CMS/HCC)    • Suicide attempt (CMS/[facility])        No Known Allergies    Past Surgical History:   Procedure Laterality Date   • BACK SURGERY     • CHOLECYSTECTOMY     • 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       Family History   Problem Relation Age of Onset   • Bipolar disorder Mother    • Dementia Father        Social History     Socioeconomic History   • Marital status:      Spouse name: Not on file   •  "Number of children: Not on file   • Years of education: Not on file   • Highest education level: Not on file   Tobacco Use   • Smoking status: Former Smoker     Packs/day: 0.25     Years: 20.00     Pack years: 5.00     Last attempt to quit: 12/15/2019     Years since quittin.4   • Smokeless tobacco: Never Used   Substance and Sexual Activity   • Alcohol use: Yes   • Drug use: No     Comment: patient had RX for xanax   • Sexual activity: Defer   Social History Narrative    Substance Abuse: Alcohol: does not drink,  Cannabis: \"40yrs ago\", Methamphetamine: does not use, Opiate: does not use, Cocaine: does not use, Synthetic: does not use and IV drug use: denies; he has been on high dose benzodiazepine from prescription for years.  These were tapered off at the [facility] last month.  He restarted using xanax once he left the [place] and went back to see his doctor.    2018: States he began smoking around 1 year ago to stay awake while driving. Only smokes a few cigarettes per day. Often goes several days without smoking.         Marriages: 7    Current Relationships:  but living separately for about 3 yrs    Children: 3        Education: some college     Occupation: on disability; he was a  for 27yrs. 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    Living Situation: he left the [place] about a week ago and has been staying at a motel.  He plans to get back with his estranged wife and move in with her.           Objective   Physical Exam   Constitutional: He is oriented to person, place, and time. He appears well-developed and well-nourished. No distress.   Smells of alcohol   HENT:   Head: Normocephalic and atraumatic.   Mouth/Throat: Oropharynx is clear and moist. No oropharyngeal exudate.   Blood at the nares. No deformity   Eyes: Pupils are equal, round, and reactive to light. Conjunctivae and EOM are normal.   Neck: Normal range of motion. Neck supple. No JVD present.   Cardiovascular: Normal rate, " regular rhythm, normal heart sounds and intact distal pulses. Exam reveals no gallop and no friction rub.   No murmur heard.  Pulmonary/Chest: Effort normal and breath sounds normal. No stridor. He has no wheezes. He has no rales.   Abdominal: Soft. Bowel sounds are normal. He exhibits no distension. There is no tenderness.   Musculoskeletal: Normal range of motion. He exhibits no edema or deformity.   Neurological: He is alert and oriented to person, place, and time. He exhibits normal muscle tone.   Clearly intoxicated, slurring words   Skin: Skin is warm and dry. Capillary refill takes less than 2 seconds. No rash noted. He is not diaphoretic. No erythema.       Procedures           ED Course            Results for orders placed or performed during the hospital encounter of 05/11/20   Comprehensive Metabolic Panel   Result Value Ref Range    Glucose 114 (H) 65 - 99 mg/dL    BUN 9 6 - 20 mg/dL    Creatinine 1.04 0.76 - 1.27 mg/dL    Sodium 146 (H) 136 - 145 mmol/L    Potassium 3.7 3.5 - 5.2 mmol/L    Chloride 109 (H) 98 - 107 mmol/L    CO2 24.0 22.0 - 29.0 mmol/L    Calcium 10.4 8.6 - 10.5 mg/dL    Total Protein 7.7 6.0 - 8.5 g/dL    Albumin 4.50 3.50 - 5.20 g/dL    ALT (SGPT) 47 (H) 1 - 41 U/L    AST (SGOT) 36 1 - 40 U/L    Alkaline Phosphatase 129 (H) 39 - 117 U/L    Total Bilirubin 0.3 0.2 - 1.2 mg/dL    eGFR Non African Amer 74 >60 mL/min/1.73    Globulin 3.2 gm/dL    A/G Ratio 1.4 g/dL    BUN/Creatinine Ratio 8.7 7.0 - 25.0    Anion Gap 13.0 5.0 - 15.0 mmol/L   Lipase   Result Value Ref Range    Lipase 63 (H) 13 - 60 U/L   Ethanol   Result Value Ref Range    Ethanol 255 (H) 0 - 10 mg/dL    Ethanol % 0.255 %   Acetaminophen Level   Result Value Ref Range    Acetaminophen <5.0 (L) 10.0 - 30.0 mcg/mL   Salicylate Level   Result Value Ref Range    Salicylate <0.3 <=30.0 mg/dL   CBC Auto Differential   Result Value Ref Range    WBC 10.08 3.40 - 10.80 10*3/mm3    RBC 5.81 (H) 4.14 - 5.80 10*6/mm3    Hemoglobin 18.4  (H) 13.0 - 17.7 g/dL    Hematocrit 52.6 (H) 37.5 - 51.0 %    MCV 90.5 79.0 - 97.0 fL    MCH 31.7 26.6 - 33.0 pg    MCHC 35.0 31.5 - 35.7 g/dL    RDW 11.9 (L) 12.3 - 15.4 %    RDW-SD 39.0 37.0 - 54.0 fl    MPV 10.3 6.0 - 12.0 fL    Platelets 187 140 - 450 10*3/mm3    Neutrophil % 78.3 (H) 42.7 - 76.0 %    Lymphocyte % 17.1 (L) 19.6 - 45.3 %    Monocyte % 3.4 (L) 5.0 - 12.0 %    Eosinophil % 0.4 0.3 - 6.2 %    Basophil % 0.2 0.0 - 1.5 %    Immature Grans % 0.6 (H) 0.0 - 0.5 %    Neutrophils, Absolute 7.90 (H) 1.70 - 7.00 10*3/mm3    Lymphocytes, Absolute 1.72 0.70 - 3.10 10*3/mm3    Monocytes, Absolute 0.34 0.10 - 0.90 10*3/mm3    Eosinophils, Absolute 0.04 0.00 - 0.40 10*3/mm3    Basophils, Absolute 0.02 0.00 - 0.20 10*3/mm3    Immature Grans, Absolute 0.06 (H) 0.00 - 0.05 10*3/mm3    nRBC 0.0 0.0 - 0.2 /100 WBC       Xr Shoulder 2+ View Right    Result Date: 5/11/2020  Right shoulder three view on 5/11/2020 CLINICAL INDICATION: Pain after fall COMPARISON: 8/23/2016 FINDINGS: There is evidence of calcified granulomatous disease in the right chest. The AC joint is well aligned. There is an old healed right distal clavicle diaphysis fracture. The glenohumeral joint is well located. There are no acute fractures.     No acute abnormality. Electronically signed by:  Harsha Armstrong  5/11/2020 4:10 AM CDT Workstation: [id number]    Xr Knee 4+ View Right    Result Date: 5/11/2020  Right knee four view on 5/11/2020 CLINICAL INDICATION: Pain after fall COMPARISON: None FINDINGS: There is calcification of the menisci consistent with chondrocalcinosis and CPPD. There is intramedullary alfonso traversing an old healed proximal tibia diaphysis fracture. No joint effusion is noted. There are no acute fractures. Visualized joints are well aligned.     Chronic findings as above with no acute abnormality. Electronically signed by:  Harsha Armstrong  5/11/2020 4:08 AM CDT Workstation: [id number]    Ct Head Without Contrast    Result Date:  5/11/2020  CT head without contrast on 5/11/2020 CLINICAL INDICATION: Head injury, per protocol for mechanism of injury TECHNIQUE: Multiple axial images are obtained throughout the head without the administration of contrast. This exam was performed according to our departmental dose-optimization program, which includes automated exposure control, adjustment of the mA and/or kV according to patient size and/or use of iterative reconstruction technique. Total DLP is 1063.8 mGy*cm. COMPARISON: 7/27/2019 FINDINGS: There is minimal low-density in the periventricular white matter consistent with minimal chronic small vessel ischemic changes. There is no hydrocephalus. There is no CT evidence of acute infarct. There is no hemorrhage. There are no abnormal extra-axial fluid collections. There is no mass, mass effect or midline shift. No bony abnormality is noted.     No acute intracranial abnormality. Electronically signed by:  Harsha Armstrong  5/11/2020 3:53 AM CDT Workstation: [id number]                                      [facility]  Number of Diagnoses or Management Options  Diagnosis management comments: Patient is intoxicated, suicidal. His alcohol is 255. He will need to be admitted until he can be cleared medically for psych.      Final diagnoses:   Alcoholic intoxication with complication (CMS/[facility])   Suicidal ideation            Ash Granados MD  05/11/20 0415

## 2020-05-11 NOTE — ED NOTES
Asked pt to attempt voided urine. Pt refuses. Pt refuses in/out catheter.     Karissa Pardo RN  05/11/20 [id number]

## 2020-05-11 NOTE — CONSULTS
"Inpatient Consult to Psychiatry    5/11/2020    Referring Provider: Dr. Johan Chappell  Reason for Consultation: suicidal ideation    Source of History:  chart review and the patient    HPI:    Mr. Romero \"Tenzin\" Lois is a 55 yr old male that is currently in room 314. He was brought to the ER last night after making a call to the crisis line stating he had drank 2 5ths of alcohol and took an overdose of medications.  In the ER he was quiet belligerent and aggressive with staff.    At this time he is resting quietly in bed, he is sober, BAL is less than   0.036, he is alert and oriented.   Patient reports that he was \"feeling sorry for myself and lonely, so I drank and got more down and called the hotline, I didn't think they would find me, but they did and made me come in\"    Patient states he does  Not recall events in the ER but that he is embarrassed of his actions. Patient reports that he has been living alone in an apartment in [place], he states he has no one to talk to and can't get out much because of COVID. He states that he does play video games to take up time.   Patient denies illicit drug use, states that he has not been drinking alcohol regularly and that he has been taking medications for depression and anxiety at home.   At this time pt denies SI/HI/AVH, he is requesting to be discharged.     Psychiatric Review Of Systems:  anxiety, depression and suicidal ideations     History  Past psychiatric history: Patient has been hospitalized numerous times in the past but states that he has not been in the hospital since here last March.  Pt has diagnosis of MDD and Alzheimer's disease  Patient also reports TBI from boxing injuries    Psychiatric Hospitalizations: Patient has had more than 5 prior hospitalizations.    Suicide Attempts: Patient has had 3  prior suicide attempts.    Prior Treatment and Medications Tried: Xanax, Klonopin, Restoril, Celexa, Aricept, Zyprexa, Risperdal, Seroquel, Depakote, " "and Prozac     History of violence or legal issues: He has a history of DUI and assault charges, also Meth related charge in 2018      Social History:    Social History     Socioeconomic History   • Marital status:      Spouse name: Not on file   • Number of children: Not on file   • Years of education: Not on file   • Highest education level: Not on file   Tobacco Use   • Smoking status: Former Smoker     Packs/day: 0.25     Years: 20.00     Pack years: 5.00     Last attempt to quit: 12/15/2019     Years since quittin.4   • Smokeless tobacco: Never Used   Substance and Sexual Activity   • Alcohol use: Yes   • Drug use: No     Comment: patient had RX for xanax   • Sexual activity: Defer   Social History Narrative    Substance Abuse: Alcohol: does not drink,  Cannabis: \"40yrs ago\", Methamphetamine: does not use, Opiate: does not use, Cocaine: does not use, Synthetic: does not use and IV drug use: denies; he has been on high dose benzodiazepine from prescription for years.  These were tapered off at the [facility] last month.  He restarted using xanax once he left the [place] and went back to see his doctor.    2018: States he began smoking around 1 year ago to stay awake while driving. Only smokes a few cigarettes per day. Often goes several days without smoking.         Marriages: 7    Current Relationships:  but living separately for about 3 yrs    Children: 3        Education: some college     Occupation: on disability; he was a  for 27yrs. 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    Living Situation: he left the [place] about a week ago and has been staying at a motel.  He plans to get back with his estranged wife and move in with her.       Abuse/Trauma: History of physical abuse: no, History of sexual abuse: no and History of verbal/emotional abuse: no      Family History:    Family History   Problem Relation Age of Onset   • Bipolar disorder Mother    • Dementia Father        Further " details: none other family history pertinent   Past Medical and Surgical History:    Past Medical History:   Diagnosis Date   • Alzheimer disease (CMS/HCC)    • Anxiety    • Back pain, chronic    • Bipolar 1 disorder (CMS/HCC)    • Depression    • Depression    • Diabetes mellitus (CMS/HCC)    • GERD (gastroesophageal reflux disease)    • Head injury    • Hyperlipidemia    • Hypertension    • Injury of back    • Manic depression (CMS/HCC)    • Parkinson disease (CMS/HCC)    • Psychiatric complaint    • Psychosis (CMS/HCC)    • Suicide attempt (CMS/[facility])      Past Surgical History:   Procedure Laterality Date   • BACK SURGERY     • CHOLECYSTECTOMY     • 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  Medications Prior to Admission   Medication Sig Dispense Refill Last Dose   • ALPRAZolam (XANAX) 1 MG tablet Take 2 mg by mouth 3 (Three) Times a Day As Needed for Anxiety.      • rivastigmine (EXELON) 6 MG capsule Take 6 mg by mouth 2 (Two) Times a Day.      • zolpidem (AMBIEN) 10 MG tablet Take 10 mg by mouth At Night As Needed for Sleep.      • aspirin 81 MG chewable tablet Chew 1 tablet Daily. 30 tablet 0    • atenolol (TENORMIN) 50 MG tablet Take 50 mg by mouth Daily.      • atorvastatin (LIPITOR) 20 MG tablet Take 1 tablet by mouth Every Night. 30 tablet 0    • busPIRone (BUSPAR) 10 MG tablet Take 10 mg by mouth 2 (Two) Times a Day.      • citalopram (CeleXA) 40 MG tablet Take 40 mg by mouth Daily.      • clonazePAM (KlonoPIN) 1 MG tablet Take 1 tablet by mouth 2 (Two) Times a Day. 30 tablet 0    • clopidogrel (PLAVIX) 75 MG tablet Take 1 tablet by mouth Daily. 30 tablet 0    • donepezil (ARICEPT) 10 MG tablet Take 1 tablet by mouth Every Night. 30 tablet 0    • doxazosin (CARDURA) 2 MG tablet Take 2 mg by mouth Every Night.      • DULoxetine (CYMBALTA) 60 MG capsule Take 1 capsule by mouth Daily. 30 capsule 0    • hydrochlorothiazide (HYDRODIURIL) 25 MG tablet Take  1 tablet by mouth Daily. 30 tablet 0    • losartan (COZAAR) 50 MG tablet Take 100 mg by mouth Daily.      • melatonin 3 MG tablet Take 1 tablet by mouth Every Night. 30 tablet 0    • metoprolol succinate XL (TOPROL-XL) 50 MG 24 hr tablet Take 1 tablet by mouth Daily. 30 tablet 0    • mirtazapine (REMERON) 15 MG tablet Take 15 mg by mouth Every Night.      • naproxen (NAPROSYN) 500 MG tablet Take 500 mg by mouth 2 (Two) Times a Day With Meals.      • omeprazole (priLOSEC) 40 MG capsule Take 40 mg by mouth Daily.      • pantoprazole (PROTONIX) 40 MG EC tablet Take 1 tablet by mouth Daily. 30 tablet 0    • primidone (MYSOLINE) 50 MG tablet Take 50 mg by mouth Every Night.      • QUEtiapine (SEROquel) 300 MG tablet Take 0.5 tablets by mouth Every Night. 15 tablet 0    • terazosin (HYTRIN) 2 MG capsule Take 1 capsule by mouth 2 (Two) Times a Day. 60 capsule 0    • topiramate (TOPAMAX) 50 MG tablet Take 50 mg by mouth 2 (Two) Times a Day.          Medical Review Of Systems:  Reviewed review of systems from  [facility] note from today.     Mouth/Throat: Oropharynx is clear and moist.   Eyes: Pupils are equal, round, and reactive to light. EOM are normal.   Neck: Normal range of motion. Neck supple.   Cardiovascular: Normal rate, regular rhythm and normal heart sounds.   Pulmonary/Chest: Effort normal and breath sounds normal. He has no wheezes. He has no rales.   Abdominal: Soft. Bowel sounds are normal. There is no tenderness.   Musculoskeletal: Normal range of motion. He exhibits no edema or tenderness.   Neurological: He is alert.   Skin: Skin is warm. Capillary refill takes less than 2 seconds. He is not diaphoretic.       Objective     Vital Signs    Temp:  [96.2 °F (35.7 °C)-98.7 °F (37.1 °C)] 98.7 °F (37.1 °C)  Heart Rate:  [] 73  Resp:  [16-20] 18  BP: (109-136)/(56-91) 109/65    Physical Exam:   General Appearance: alert, appears stated age and cooperative,  Hygiene:   fair  Gait & Station: deferred, in  bed  Musculoskeletal: No tremors or abnormal involuntary movements    Mental Status Exam:   Cooperation:  Cooperative  Eye Contact:  Fair  Psychomotor Behavior:  Appropriate  Mood: Anxious/Nervous  Affect:  mood-congruent  Speech:  Normal  Thought Process:  Coherent  Associations: Goal Directed  Thought Content:     Mood congruent   Suicidal:  None   Homicidal:  None   Hallucinations:  None   Delusion:  None  Cognitive Functioning:   Consciousness: awake, alert and oriented   Orientation:  Person, Place, Time and Situation   Attention: normal Concentration: Normal   Language:  Intact Vocabulary: Average   Short Term Memory: Deficits   Long Term Memory: Deficits   Fund of Knowledge: Average  Reliability:  fair  Insight:  Poor  Judgement:  Impaired  Impulse Control:  Impaired    Diagnostic Data:    Lab Results (last 72 hours)     Procedure Component Value Units Date/Time    Ethanol [[id number]]  (Abnormal) Collected:  05/11/20 1358    Specimen:  Blood Updated:  05/11/20 1421     Ethanol 36 mg/dL      Ethanol % 0.036 %     Urine Drug Screen - Urine, Clean Catch [[id number]]  (Abnormal) Collected:  05/11/20 1218    Specimen:  Urine, Clean Catch Updated:  05/11/20 1308     THC, Screen, Urine Negative     Phencyclidine (PCP), Urine Negative     Cocaine Screen, Urine Negative     Methamphetamine, Ur Negative     Opiate Screen Negative     Amphetamine Screen, Urine Negative     Benzodiazepine Screen, Urine Positive     Tricyclic Antidepressants Screen Negative     Methadone Screen, Urine Negative     Barbiturates Screen, Urine Negative     Oxycodone Screen, Urine Negative     Propoxyphene Screen Negative     Buprenorphine, Screen, Urine Negative    Narrative:       Cutoff For Drugs Screened:    Amphetamines               500 ng/ml  Barbiturates               200 ng/ml  Benzodiazepines            150 ng/ml  Cocaine                    150 ng/ml  Methadone                  200 ng/ml  Opiates                    100  ng/ml  Phencyclidine               25 ng/ml  THC                            50 ng/ml  Methamphetamine            500 ng/ml  Tricyclic Antidepressants  300 ng/ml  Oxycodone                  100 ng/ml  Propoxyphene               300 ng/ml  Buprenorphine               10 ng/ml    The normal value for all drugs tested is negative. This report includes unconfirmed screening results, with the cutoff values listed, to be used for medical treatment purposes only.  Unconfirmed results must not be used for non-medical purposes such as employment or legal testing.  Clinical consideration should be applied to any drug of abuse test, particularly when unconfirmed results are used.      Urinalysis With Microscopic If Indicated (No Culture) - Urine, Clean Catch [[id number]]  (Abnormal) Collected:  05/11/20 1218    Specimen:  Urine, Clean Catch Updated:  05/11/20 1252     Color, UA Yellow     Appearance, UA Clear     pH, UA <=5.0     Specific Gravity, UA 1.016     Glucose, UA Negative     Ketones, UA Trace     Bilirubin, UA Negative     Blood, UA Negative     Protein, UA Negative     Leuk Esterase, UA Negative     Nitrite, UA Negative     Urobilinogen, UA 0.2 E.U./dL    Narrative:       Urine microscopic not indicated.    POC Glucose Once [[id number]]  (Abnormal) Collected:  05/11/20 1054    Specimen:  Blood Updated:  05/11/20 1107     Glucose 136 mg/dL      Comment: RN NotifiedOperator: [id number] GABRIELLA ANGELITAMeter ID: [id number]       Ethanol [[id number]]  (Abnormal) Collected:  05/11/20 0958    Specimen:  Blood Updated:  05/11/20 1047     Ethanol 131 mg/dL      Ethanol % 0.131 %     Hemoglobin A1c [[id number]]  (Normal) Collected:  05/11/20 0316    Specimen:  Blood Updated:  05/11/20 0820     Hemoglobin A1C 5.30 %     Narrative:       Hemoglobin A1C Ranges:    Increased Risk for Diabetes  5.7% to 6.4%  Diabetes                     >= 6.5%  Diabetic Goal                < 7.0%    Extra Tubes [[id number]] Collected:  05/11/20 0320     Specimen:  Blood Updated:  05/11/20 0430    Narrative:       The following orders were created for panel order Extra Tubes.  Procedure                               Abnormality         Status                     ---------                               -----------         ------                     Light Blue Top[[id number]]                                   Final result               Gold Top - SST[[id number]]                                   Final result                 Please view results for these tests on the individual orders.    Light Blue Top [[id number]] Collected:  05/11/20 0320    Specimen:  Blood Updated:  05/11/20 0430     Extra Tube hold for add-on     Comment: Auto resulted       Gold Top - SST [[id number]] Collected:  05/11/20 0320    Specimen:  Blood Updated:  05/11/20 0430     Extra Tube Hold for add-ons.     Comment: Auto resulted.       Comprehensive Metabolic Panel [[id number]]  (Abnormal) Collected:  05/11/20 0316    Specimen:  Blood Updated:  05/11/20 0350     Glucose 114 mg/dL      BUN 9 mg/dL      Creatinine 1.04 mg/dL      Sodium 146 mmol/L      Potassium 3.7 mmol/L      Chloride 109 mmol/L      CO2 24.0 mmol/L      Calcium 10.4 mg/dL      Total Protein 7.7 g/dL      Albumin 4.50 g/dL      ALT (SGPT) 47 U/L      AST (SGOT) 36 U/L      Alkaline Phosphatase 129 U/L      Total Bilirubin 0.3 mg/dL      eGFR Non African Amer 74 mL/min/1.73      Globulin 3.2 gm/dL      A/G Ratio 1.4 g/dL      BUN/Creatinine Ratio 8.7     Anion Gap 13.0 mmol/L     Narrative:       GFR Normal >60  Chronic Kidney Disease <60  Kidney Failure <15      Lipase [[id number]]  (Abnormal) Collected:  05/11/20 0316    Specimen:  Blood Updated:  05/11/20 0350     Lipase 63 U/L     Ethanol [[id number]]  (Abnormal) Collected:  05/11/20 0316    Specimen:  Blood Updated:  05/11/20 0350     Ethanol 255 mg/dL      Ethanol % 0.255 %     Acetaminophen Level [[id number]]  (Abnormal) Collected:  05/11/20 0316    Specimen:  Blood Updated:   05/11/20 0350     Acetaminophen <5.0 mcg/mL     Salicylate Level [[id number]]  (Normal) Collected:  05/11/20 0316    Specimen:  Blood Updated:  05/11/20 0350     Salicylate <0.3 mg/dL     CBC & Differential [[id number]] Collected:  05/11/20 0316    Specimen:  Blood Updated:  05/11/20 0326    Narrative:       The following orders were created for panel order CBC & Differential.  Procedure                               Abnormality         Status                     ---------                               -----------         ------                     CBC Auto Differential[[id number]]        Abnormal            Final result                 Please view results for these tests on the individual orders.    CBC Auto Differential [[id number]]  (Abnormal) Collected:  05/11/20 0316    Specimen:  Blood Updated:  05/11/20 0326     WBC 10.08 10*3/mm3      RBC 5.81 10*6/mm3      Hemoglobin 18.4 g/dL      Hematocrit 52.6 %      MCV 90.5 fL      MCH 31.7 pg      MCHC 35.0 g/dL      RDW 11.9 %      RDW-SD 39.0 fl      MPV 10.3 fL      Platelets 187 10*3/mm3      Neutrophil % 78.3 %      Lymphocyte % 17.1 %      Monocyte % 3.4 %      Eosinophil % 0.4 %      Basophil % 0.2 %      Immature Grans % 0.6 %      Neutrophils, Absolute 7.90 10*3/mm3      Lymphocytes, Absolute 1.72 10*3/mm3      Monocytes, Absolute 0.34 10*3/mm3      Eosinophils, Absolute 0.04 10*3/mm3      Basophils, Absolute 0.02 10*3/mm3      Immature Grans, Absolute 0.06 10*3/mm3      nRBC 0.0 /100 WBC         Imaging Results (Last 72 Hours)     Procedure Component Value Units Date/Time    XR Shoulder 2+ View Right [[id number]] Collected:  05/11/20 0345     Updated:  05/11/20 0411    Narrative:       Right shoulder three view on 5/11/2020    CLINICAL INDICATION: Pain after fall    COMPARISON: 8/23/2016    FINDINGS: There is evidence of calcified granulomatous disease in  the right chest. The AC joint is well aligned. There is an old  healed right distal clavicle diaphysis  fracture. The glenohumeral  joint is well located. There are no acute fractures.      Impression:       No acute abnormality.    Electronically signed by:  Harsha Armstrong  5/11/2020 4:10 AM  CDT Workstation: [id number]    XR Knee 4+ View Right [[id number]] Collected:  05/11/20 0344     Updated:  05/11/20 0409    Narrative:       Right knee four view on 5/11/2020    CLINICAL INDICATION: Pain after fall    COMPARISON: None    FINDINGS: There is calcification of the menisci consistent with  chondrocalcinosis and CPPD. There is intramedullary alfonso  traversing an old healed proximal tibia diaphysis fracture. No  joint effusion is noted. There are no acute fractures. Visualized  joints are well aligned.      Impression:       Chronic findings as above with no acute abnormality.    Electronically signed by:  Harsha Armstrong  5/11/2020 4:08 AM  CDT Workstation: [id number]    CT Head Without Contrast [[id number]] Collected:  05/11/20 0318     Updated:  05/11/20 0355    Narrative:         CT head without contrast on 5/11/2020     CLINICAL INDICATION: Head injury, per protocol for mechanism of  injury    TECHNIQUE: Multiple axial images are obtained throughout the head  without the administration of contrast. This exam was performed  according to our departmental dose-optimization program, which  includes automated exposure control, adjustment of the mA and/or  kV according to patient size and/or use of iterative  reconstruction technique.  Total DLP is 1063.8 mGy*cm.    COMPARISON: 7/27/2019    FINDINGS: There is minimal low-density in the periventricular  white matter consistent with minimal chronic small vessel  ischemic changes. There is no hydrocephalus. There is no CT  evidence of acute infarct. There is no hemorrhage. There are no  abnormal extra-axial fluid collections. There is no mass, mass  effect or midline shift. No bony abnormality is noted.      Impression:       No acute intracranial abnormality.    Electronically  signed by:  Harsha Armstrong  5/11/2020 3:53 AM  CDT Workstation: [id number]          Assessment/Plan       Suicidal ideation    Severe episode of recurrent major depressive disorder, with psychotic features (CMS/HCC)    Anxiety disorder    Major neurocognitive disorder (CMS/HCC)    Alcoholic intoxication with complication (CMS/HCC)    Diabetes mellitus (CMS/HCC)    Class 1 obesity due to excess calories with serious comorbidity and body mass index (BMI) of 34.0 to 34.9 in adult      Recommendations:    Patient is seen in room 314 after presenting to the ER with alcohol intoxication, he had made a call to crisis line about suicide.  Patient at this time denies suicide, but due to the seriousness of events, would like to evaluate again once medically stable.    Patient reports taking medications at home for depression and anxiety, these are ordered inpatient presently and should continue.    Thank you for the consult, will check back tomorrow.     Aide Sullivan, MORRO  05/11/20  17:00

## 2020-05-12 LAB
ETHANOL BLD-MCNC: <10 MG/DL (ref 0–10)
ETHANOL UR QL: <0.01 %
GLUCOSE BLDC GLUCOMTR-MCNC: 121 MG/DL (ref 70–130)
GLUCOSE BLDC GLUCOMTR-MCNC: 129 MG/DL (ref 70–130)
GLUCOSE BLDC GLUCOMTR-MCNC: 129 MG/DL (ref 70–130)
GLUCOSE BLDC GLUCOMTR-MCNC: 168 MG/DL (ref 70–130)
GLUCOSE BLDC GLUCOMTR-MCNC: 177 MG/DL (ref 70–130)

## 2020-05-12 PROCEDURE — 80307 DRUG TEST PRSMV CHEM ANLYZR: CPT | Performed by: STUDENT IN AN ORGANIZED HEALTH CARE EDUCATION/TRAINING PROGRAM

## 2020-05-12 PROCEDURE — 96372 THER/PROPH/DIAG INJ SC/IM: CPT

## 2020-05-12 PROCEDURE — 99225 PR SBSQ OBSERVATION CARE/DAY 25 MINUTES: CPT | Performed by: STUDENT IN AN ORGANIZED HEALTH CARE EDUCATION/TRAINING PROGRAM

## 2020-05-12 PROCEDURE — 82962 GLUCOSE BLOOD TEST: CPT

## 2020-05-12 PROCEDURE — 25010000002 ENOXAPARIN PER 10 MG: Performed by: STUDENT IN AN ORGANIZED HEALTH CARE EDUCATION/TRAINING PROGRAM

## 2020-05-12 PROCEDURE — 99213 OFFICE O/P EST LOW 20 MIN: CPT | Performed by: PSYCHIATRY & NEUROLOGY

## 2020-05-12 PROCEDURE — G0378 HOSPITAL OBSERVATION PER HR: HCPCS

## 2020-05-12 RX ORDER — CLONAZEPAM 0.5 MG/1
0.75 TABLET ORAL 2 TIMES DAILY
Status: DISCONTINUED | OUTPATIENT
Start: 2020-05-12 | End: 2020-05-13 | Stop reason: HOSPADM

## 2020-05-12 RX ADMIN — BUSPIRONE HYDROCHLORIDE 10 MG: 10 TABLET ORAL at 09:00

## 2020-05-12 RX ADMIN — ATORVASTATIN CALCIUM 20 MG: 20 TABLET, FILM COATED ORAL at 21:25

## 2020-05-12 RX ADMIN — ENOXAPARIN SODIUM 40 MG: 40 INJECTION SUBCUTANEOUS at 08:57

## 2020-05-12 RX ADMIN — CLOPIDOGREL BISULFATE 75 MG: 75 TABLET ORAL at 09:00

## 2020-05-12 RX ADMIN — PANTOPRAZOLE SODIUM 40 MG: 40 TABLET, DELAYED RELEASE ORAL at 08:59

## 2020-05-12 RX ADMIN — CLONAZEPAM 1 MG: 0.5 TABLET ORAL at 08:59

## 2020-05-12 RX ADMIN — DULOXETINE HYDROCHLORIDE 60 MG: 60 CAPSULE, DELAYED RELEASE ORAL at 09:00

## 2020-05-12 RX ADMIN — LOSARTAN POTASSIUM 100 MG: 50 TABLET ORAL at 09:02

## 2020-05-12 RX ADMIN — BUSPIRONE HYDROCHLORIDE 10 MG: 10 TABLET ORAL at 21:25

## 2020-05-12 RX ADMIN — SODIUM CHLORIDE, PRESERVATIVE FREE 10 ML: 5 INJECTION INTRAVENOUS at 09:01

## 2020-05-12 RX ADMIN — METOPROLOL SUCCINATE 50 MG: 50 TABLET, EXTENDED RELEASE ORAL at 09:00

## 2020-05-12 RX ADMIN — TERAZOSIN HYDROCHLORIDE 2 MG: 2 CAPSULE ORAL at 21:25

## 2020-05-12 RX ADMIN — TERAZOSIN HYDROCHLORIDE 2 MG: 2 CAPSULE ORAL at 09:00

## 2020-05-12 RX ADMIN — CLONAZEPAM 0.75 MG: 0.5 TABLET ORAL at 21:24

## 2020-05-12 NOTE — PLAN OF CARE
Problem: Patient Care Overview  Goal: Plan of Care Review  Outcome: Ongoing (interventions implemented as appropriate)  Flowsheets (Taken 5/12/2020 1814)  Progress: no change  Plan of Care Reviewed With: patient  Outcome Summary: Vss, denies pain, agiated about possibly going to pysch, resting between care, cont to monitor.

## 2020-05-12 NOTE — NURSING NOTE
Patient is refusing his Iv fluids at this time.  Patient stated he doesn't get them at home and he doesn't want them here.

## 2020-05-12 NOTE — NURSING NOTE
Patient asked me to call Sujit in behavioral health to see when she would be to see him today.      I spoke with her and she said it would be about an hour.

## 2020-05-12 NOTE — PROGRESS NOTES
FAMILY MEDICINE DAILY PROGRESS NOTE    NAME: Nick Abreu  : 1965  MRN: [id number]      LOS: 0 days     PROVIDER OF SERVICE: Johan Chappell Jr, MD    Chief Complaint: Suicidal ideation    Subjective:     Interval History:  History taken from: patient chart  No acute events overnight.  He denies SI this AM & is adamantly opposed to inpatient psych admission.  We discussed at length that due to the seriousness of his calling the suicide crisis line he would need to be evaluated by psychiatry again.  He is agreeable to this.      Review of Systems:   Review of Systems   Constitutional: Negative for chills, diaphoresis, fatigue and fever.   HENT: Negative for ear pain and sore throat.    Eyes: Negative for pain and visual disturbance.   Respiratory: Negative for chest tightness and shortness of breath.    Cardiovascular: Negative for chest pain and palpitations.   Gastrointestinal: Negative for abdominal distention, abdominal pain, diarrhea and nausea.   Endocrine: Negative for polydipsia and polyuria.   Genitourinary: Negative for dysuria, flank pain and hematuria.   Musculoskeletal: Negative for arthralgias, joint swelling and myalgias.   Skin: Negative for color change and pallor.   Neurological: Negative for dizziness, seizures and speech difficulty.   Hematological: Negative for adenopathy. Does not bruise/bleed easily.   Psychiatric/Behavioral: Negative for agitation, confusion and suicidal ideas (Denies at this time).       Objective:     Vital Signs  Temp:  [96.2 °F (35.7 °C)-98.7 °F (37.1 °C)] 97.2 °F (36.2 °C)  Heart Rate:  [65-81] 65  Resp:  [18] 18  BP: (108-118)/(63-70) 109/63  Body mass index is 31.55 kg/m².    Physical Exam  Physical Exam   Constitutional: He is oriented to person, place, and time. He appears well-developed and well-nourished. No distress.   HENT:   Head: Normocephalic and atraumatic.   Right Ear: External ear normal.   Left Ear: External ear normal.   Nose: Nose  normal.   Mouth/Throat: Oropharynx is clear and moist. No oropharyngeal exudate.   Eyes: Pupils are equal, round, and reactive to light. Conjunctivae and EOM are normal. Right eye exhibits no discharge. Left eye exhibits no discharge. No scleral icterus.   Neck: Normal range of motion. Neck supple. No tracheal deviation present. No thyromegaly present.   Cardiovascular: Normal rate, regular rhythm, S1 normal, S2 normal, normal heart sounds and intact distal pulses. Exam reveals no gallop and no friction rub.   No murmur heard.  Pulmonary/Chest: Effort normal and breath sounds normal. No stridor. No respiratory distress. He has no wheezes. He has no rales. He exhibits no tenderness.   Abdominal: Soft. Bowel sounds are normal. He exhibits no distension. There is no tenderness.   Musculoskeletal: Normal range of motion. He exhibits no edema, tenderness or deformity.   Neurological: He is alert and oriented to person, place, and time. No cranial nerve deficit.   Skin: Skin is warm and dry. Capillary refill takes less than 2 seconds. No rash noted. He is not diaphoretic. No erythema.   Psychiatric: He has a normal mood and affect. His behavior is normal. He expresses no suicidal plans and no homicidal plans.   Vitals reviewed.      Medication Review    Current Facility-Administered Medications:   •  acetaminophen (TYLENOL) tablet 650 mg, 650 mg, Oral, Q6H PRN, Javier Hayward MD, 650 mg at 05/11/20 1709  •  aluminum-magnesium hydroxide-simethicone (MAALOX MAX) 400-400-40 MG/5ML suspension 15 mL, 15 mL, Oral, Q6H PRN, Greg Mullen MD  •  atorvastatin (LIPITOR) tablet 20 mg, 20 mg, Oral, Nightly, Greg Mullen MD, 20 mg at 05/11/20 2021  •  busPIRone (BUSPAR) tablet 10 mg, 10 mg, Oral, BID, Greg Mullen MD, 10 mg at 05/11/20 2021  •  clonazePAM (KlonoPIN) tablet 1 mg, 1 mg, Oral, BID, Greg Mullen MD, 1 mg at 05/11/20 2020  •  clopidogrel (PLAVIX) tablet 75 mg, 75 mg, Oral, Daily,  Greg Mullen MD, 75 mg at 05/11/20 0911  •  dextrose (D50W) 25 g/ 50mL Intravenous Solution 25 g, 25 g, Intravenous, Q15 Min PRN, Greg Mullen MD  •  dextrose (GLUTOSE) oral gel 15 g, 15 g, Oral, Q15 Min PRN, Greg Mullen MD  •  DULoxetine (CYMBALTA) DR capsule 60 mg, 60 mg, Oral, Daily, Greg Mullen MD  •  enoxaparin (LOVENOX) syringe 40 mg, 40 mg, Subcutaneous, Q24H, Greg Mullen MD, 40 mg at 05/11/20 0911  •  glucagon (human recombinant) (GLUCAGEN DIAGNOSTIC) injection 1 mg, 1 mg, Subcutaneous, Q15 Min PRN, Greg Mullen MD  •  hydroCHLOROthiazide (HYDRODIURIL) tablet 25 mg, 25 mg, Oral, Daily, Greg Mullen MD, 25 mg at 05/11/20 0911  •  insulin aspart (novoLOG) injection 0-7 Units, 0-7 Units, Subcutaneous, TID AC, Greg Mullen MD  •  losartan (COZAAR) tablet 100 mg, 100 mg, Oral, Daily, Greg Mullen MD, 100 mg at 05/11/20 0911  •  metoprolol succinate XL (TOPROL-XL) 24 hr tablet 50 mg, 50 mg, Oral, Daily, Greg Mullen MD, 50 mg at 05/11/20 0911  •  ondansetron (ZOFRAN) injection 4 mg, 4 mg, Intravenous, Q6H PRN, Greg Mullen MD  •  pantoprazole (PROTONIX) EC tablet 40 mg, 40 mg, Oral, Daily, Greg Mullen MD, 40 mg at 05/11/20 0911  •  QUEtiapine (SEROquel) tablet 150 mg, 150 mg, Oral, Nightly, Greg Mullen MD  •  sodium chloride 0.45 % infusion, 125 mL/hr, Intravenous, Continuous, Greg Mullen MD, Last Rate: 125 mL/hr at 05/12/20 0633, 125 mL/hr at 05/12/20 0633  •  [COMPLETED] Insert peripheral IV, , , Once **AND** sodium chloride 0.9 % flush 10 mL, 10 mL, Intravenous, PRN, Greg Mullen MD  •  sodium chloride 0.9 % flush 10 mL, 10 mL, Intravenous, Q12H, Greg Mullen MD, 10 mL at 05/11/20 0912  •  sodium chloride 0.9 % flush 10 mL, 10 mL, Intravenous, PRN, Greg Mullen MD  •  terazosin (HYTRIN) capsule 2 mg, 2 mg, Oral, Q12H, Greg Mullen MD, 2 mg at  05/11/20 2020     Diagnostic Data    Lab Results (last 24 hours)     Procedure Component Value Units Date/Time    POC Glucose Once [[id number]]  (Abnormal) Collected:  05/11/20 1623    Specimen:  Blood Updated:  05/12/20 0221     Glucose 168 mg/dL      Comment: RN NotifiedOperator: [id number] BAKER DENISAMeter ID: [id number]       Ethanol [[id number]]  (Abnormal) Collected:  05/11/20 1358    Specimen:  Blood Updated:  05/11/20 1421     Ethanol 36 mg/dL      Ethanol % 0.036 %     Urine Drug Screen - Urine, Clean Catch [[id number]]  (Abnormal) Collected:  05/11/20 1218    Specimen:  Urine, Clean Catch Updated:  05/11/20 1308     THC, Screen, Urine Negative     Phencyclidine (PCP), Urine Negative     Cocaine Screen, Urine Negative     Methamphetamine, Ur Negative     Opiate Screen Negative     Amphetamine Screen, Urine Negative     Benzodiazepine Screen, Urine Positive     Tricyclic Antidepressants Screen Negative     Methadone Screen, Urine Negative     Barbiturates Screen, Urine Negative     Oxycodone Screen, Urine Negative     Propoxyphene Screen Negative     Buprenorphine, Screen, Urine Negative    Narrative:       Cutoff For Drugs Screened:    Amphetamines               500 ng/ml  Barbiturates               200 ng/ml  Benzodiazepines            150 ng/ml  Cocaine                    150 ng/ml  Methadone                  200 ng/ml  Opiates                    100 ng/ml  Phencyclidine               25 ng/ml  THC                            50 ng/ml  Methamphetamine            500 ng/ml  Tricyclic Antidepressants  300 ng/ml  Oxycodone                  100 ng/ml  Propoxyphene               300 ng/ml  Buprenorphine               10 ng/ml    The normal value for all drugs tested is negative. This report includes unconfirmed screening results, with the cutoff values listed, to be used for medical treatment purposes only.  Unconfirmed results must not be used for non-medical purposes such as employment or legal testing.   Clinical consideration should be applied to any drug of abuse test, particularly when unconfirmed results are used.      Urinalysis With Microscopic If Indicated (No Culture) - Urine, Clean Catch [[id number]]  (Abnormal) Collected:  05/11/20 1218    Specimen:  Urine, Clean Catch Updated:  05/11/20 1252     Color, UA Yellow     Appearance, UA Clear     pH, UA <=5.0     Specific Gravity, UA 1.016     Glucose, UA Negative     Ketones, UA Trace     Bilirubin, UA Negative     Blood, UA Negative     Protein, UA Negative     Leuk Esterase, UA Negative     Nitrite, UA Negative     Urobilinogen, UA 0.2 E.U./dL    Narrative:       Urine microscopic not indicated.    POC Glucose Once [[id number]]  (Abnormal) Collected:  05/11/20 1054    Specimen:  Blood Updated:  05/11/20 1107     Glucose 136 mg/dL      Comment: RN NotifiedOperator: [id number] GABRIELLA ANGELITAMeter ID: [id number]       Ethanol [[id number]]  (Abnormal) Collected:  05/11/20 0958    Specimen:  Blood Updated:  05/11/20 1047     Ethanol 131 mg/dL      Ethanol % 0.131 %     Hemoglobin A1c [[id number]]  (Normal) Collected:  05/11/20 0316    Specimen:  Blood Updated:  05/11/20 0820     Hemoglobin A1C 5.30 %     Narrative:       Hemoglobin A1C Ranges:    Increased Risk for Diabetes  5.7% to 6.4%  Diabetes                     >= 6.5%  Diabetic Goal                < 7.0%            I reviewed the patient's new clinical results.    Assessment/Plan:     Active Hospital Problems    Diagnosis POA   • **Suicidal ideation [R45.851] Not Applicable     Patient called suicide hotline who were concerned enough with his call that they called EMS to take to ED.  - Last Ethanol 36  - UDS  - psych to re-evaluate today     • Alcoholic intoxication with complication (CMS/HCC) [F10.929] Yes     Presenting EtOH elevated to 255. Became aggressive with security while in ED requiring IM geodon.   - last Ethanol 36, will repeat this AM     • Diabetes mellitus (CMS/HCC) [E11.9] Yes     History of  DM. Unknown type. Last A1c 2014 5.4%  - Repeat A1c  - glucose fingersticks ACTID  - SSI  - consider antibody screen to delineate Type 1 vs Type 2     • Class 1 obesity due to excess calories with serious comorbidity and body mass index (BMI) of 34.0 to 34.9 in adult [E66.09, Z68.34] Not Applicable     Nutrition consult     • Anxiety disorder [F41.9] Yes     Medications list includes SSRI, SNRI, and benzodiazepines. Will restart medicaitons based on last psychiatric hospitalizaton.  - cymbalta 60 mg qd  - seroquel 150 mg qhs  - terazosin 2 mg bid  - klonopin 1 mg bid     • Major neurocognitive disorder (CMS/HCC) [F01.50] Yes     Continue donepezil      • Severe episode of recurrent major depressive disorder, with psychotic features (CMS/HCC) [F33.3] Yes     Medication list includes SSRI and SNRI. Will restart medications as listed in last psychiatric hospitalization  - cymbalta 60 mg qd  - seroquel 150 mg qhs  - terazosin 2 mg bid         DVT prophylaxis: SCDs/TEDs  Code status is   Code Status and Medical Interventions:   Ordered at: 05/11/20 0435     Level Of Support Discussed With:    Patient     Code Status:    CPR     Medical Interventions (Level of Support Prior to Arrest):    Full       Plan for disposition:Where: home vs psych floor and When:  today      Time: 25 min     Johan Chappell Jr., M.D.  PGY3  Family Medicine Resident  [address] [place] [address]  Phone: [phone]  Fax: [phone]      This document has been electronically signed by Johan Chappell Jr, MD on 05/12/20 8:00 AM

## 2020-05-12 NOTE — NURSING NOTE
While giving morning meds patient stated if the doctors try to send him upstairs there is going to be problems.  He stated he would rather go to prison then go upstairs.

## 2020-05-12 NOTE — NURSING NOTE
I asked patient if there is anyone I could call with updates about his condition.  He stated he has no one.

## 2020-05-12 NOTE — PROGRESS NOTES
"Psychiatry Progress Note   5/12/2020      HD: #0  Legal: Hold    Chief Complaint: Suicidal Ideation, Status Post Suicide Attempt and agitation      Subjective --  Mr. Nick Abreu is a 55 y.o. male who was seen on the 3W unit.      Pt is irritable today.  Minimally engaged.  Seen with TIFF Sullivan.      Pt significantly minimizes and normalized situation.  Initially endorses OD, then later denies.  Cannot coherent discuss why things are OK, when challenged with is objective facts of his situation.  He denies depression today or anxiety, denies any AE to meds.  No HA/SA/MA    He then stops meaningfully interacting.  States we will need the \"police\" who will have to \"hand cuff\" him to continue to stay.  Focused on need for force and his going to snf vs further stay.  Attempted to de-escalate and engage but minimally helpful.        Objective   Objective --      Vital Signs:  Temp:  [97.2 °F (36.2 °C)-98.7 °F (37.1 °C)] 97.2 °F (36.2 °C)  Heart Rate:  [65-74] 68  Resp:  [18] 18  BP: (108-116)/(63-70) 116/63    Physical Exam:   -General Appearance:  uncooperative and in NAD  -Hygiene:  Adequate   -Gait & Station:  Blank multiple: Deferred, in bed  -Musculoskeletal:  No tremors or abnormal involuntary movements and No Cog Hiram or Rigidity and No atrophy noted  -Pulm: unlaboured     Mental Status Exam:   --Cooperation:  Guarded and Minimally cooperative  --Eye Contact:  Non-sustained  --Psychomotor Behavior:  Slow  --Mood:  Angry and Irritable  --Affect:  inappropriate, increased in intensity and guarded  --Speech:  yells at times  --Thought Process:  [place]  --Associations: Goal Directed and Circumstantial  --Themes:  Anger and Rage; minimization and normalization of situation  --Thought Content:     --Mood congruent   --Suicidal:  denies despite overdose reports in ED    --Homicidal:  Denies   --Hallucinations:  Denies and Not appearing to respond to internal stimuli   --Delusion:  None noted/overt and No " overt psychotic overlay  --Cognitive Functioning:   --Consciousness: awake and alert, Attention:  Distractible and Concentration:  Distractible  --Reliability:  impaired  --Insight:  Poor  --Judgment:  Impaired  --Impulse Control:  Impaired      Lab Results (last 24 hours)     Procedure Component Value Units Date/Time    POC Glucose Once [[id number]]  (Normal) Collected:  05/12/20 0858    Specimen:  Blood Updated:  05/12/20 0919     Glucose 129 mg/dL      Comment: RN NotifiedOperator: [id number] CHRISTIANO YENNIWNMeter ID: [id number]       Ethanol [[id number]] Collected:  05/12/20 0803    Specimen:  Blood Updated:  05/12/20 0829     Ethanol <10 mg/dL      Ethanol % <0.010 %     POC Glucose Once [[id number]]  (Abnormal) Collected:  05/11/20 1623    Specimen:  Blood Updated:  05/12/20 0221     Glucose 168 mg/dL      Comment: RN NotifiedOperator: [id number] BAKER DENISAMeter ID: [id number]       Ethanol [[id number]]  (Abnormal) Collected:  05/11/20 1358    Specimen:  Blood Updated:  05/11/20 1421     Ethanol 36 mg/dL      Ethanol % 0.036 %     Urine Drug Screen - Urine, Clean Catch [[id number]]  (Abnormal) Collected:  05/11/20 1218    Specimen:  Urine, Clean Catch Updated:  05/11/20 1308     THC, Screen, Urine Negative     Phencyclidine (PCP), Urine Negative     Cocaine Screen, Urine Negative     Methamphetamine, Ur Negative     Opiate Screen Negative     Amphetamine Screen, Urine Negative     Benzodiazepine Screen, Urine Positive     Tricyclic Antidepressants Screen Negative     Methadone Screen, Urine Negative     Barbiturates Screen, Urine Negative     Oxycodone Screen, Urine Negative     Propoxyphene Screen Negative     Buprenorphine, Screen, Urine Negative    Narrative:       Cutoff For Drugs Screened:    Amphetamines               500 ng/ml  Barbiturates               200 ng/ml  Benzodiazepines            150 ng/ml  Cocaine                    150 ng/ml  Methadone                  200 ng/ml  Opiates                    100  ng/ml  Phencyclidine               25 ng/ml  THC                            50 ng/ml  Methamphetamine            500 ng/ml  Tricyclic Antidepressants  300 ng/ml  Oxycodone                  100 ng/ml  Propoxyphene               300 ng/ml  Buprenorphine               10 ng/ml    The normal value for all drugs tested is negative. This report includes unconfirmed screening results, with the cutoff values listed, to be used for medical treatment purposes only.  Unconfirmed results must not be used for non-medical purposes such as employment or legal testing.  Clinical consideration should be applied to any drug of abuse test, particularly when unconfirmed results are used.      Urinalysis With Microscopic If Indicated (No Culture) - Urine, Clean Catch [[id number]]  (Abnormal) Collected:  05/11/20 1218    Specimen:  Urine, Clean Catch Updated:  05/11/20 1252     Color, UA Yellow     Appearance, UA Clear     pH, UA <=5.0     Specific Gravity, UA 1.016     Glucose, UA Negative     Ketones, UA Trace     Bilirubin, UA Negative     Blood, UA Negative     Protein, UA Negative     Leuk Esterase, UA Negative     Nitrite, UA Negative     Urobilinogen, UA 0.2 E.U./dL    Narrative:       Urine microscopic not indicated.          Imaging Results (Last 24 Hours)     ** No results found for the last 24 hours. **            Medications:   Scheduled Meds:  atorvastatin 20 mg Oral Nightly   busPIRone 10 mg Oral BID   clonazePAM 1 mg Oral BID   clopidogrel 75 mg Oral Daily   DULoxetine 60 mg Oral Daily   enoxaparin 40 mg Subcutaneous Q24H   hydroCHLOROthiazide 25 mg Oral Daily   insulin aspart 0-7 Units Subcutaneous TID AC   losartan 100 mg Oral Daily   metoprolol succinate XL 50 mg Oral Daily   pantoprazole 40 mg Oral Daily   QUEtiapine 150 mg Oral Nightly   sodium chloride 10 mL Intravenous Q12H   terazosin 2 mg Oral Q12H     Continuous Infusions:  sodium chloride 125 mL/hr Last Rate: 125 mL/hr (05/12/20 0901)     PRN Meds:.•   acetaminophen  •  aluminum-magnesium hydroxide-simethicone  •  dextrose  •  dextrose  •  glucagon (human recombinant)  •  ondansetron  •  [COMPLETED] Insert peripheral IV **AND** sodium chloride  •  sodium chloride      Assessment:     Suicidal ideation    Severe episode of recurrent major depressive disorder, with psychotic features (CMS/HCC)    Anxiety disorder    Major neurocognitive disorder (CMS/HCC)    Alcoholic intoxication with complication (CMS/HCC)    Diabetes mellitus (CMS/HCC)    Class 1 obesity due to excess calories with serious comorbidity and body mass index (BMI) of 34.0 to 34.9 in adult    Alcohol Use D/O, binge use per pt and hx       Treatment Plan:  1) Concur with 1:1 obs and SI precautions    2.) Cont Cymbalta, buspar and seroquel at current doses for affective sx.    3.) Decrease Klonopin to 0.75mg BID give disinhibition and EtOH abuse.      7) Will need to transfer to [facility] after stabilization and bed becomes available, likely tomorrow.      --> Dispostion Planning: to [facility]        Casimiro Rodríguez II, MD  05/12/20 @ 12:34  Dictated using Dragon.

## 2020-05-13 ENCOUNTER — HOSPITAL ENCOUNTER (INPATIENT)
Facility: HOSPITAL | Age: 55
LOS: 2 days | Discharge: HOME | End: 2020-05-15
Attending: PSYCHIATRY & NEUROLOGY | Admitting: PSYCHIATRY & NEUROLOGY

## 2020-05-13 VITALS
HEIGHT: 73 IN | WEIGHT: 239.1 LBS | SYSTOLIC BLOOD PRESSURE: 114 MMHG | RESPIRATION RATE: 18 BRPM | OXYGEN SATURATION: 96 % | BODY MASS INDEX: 31.69 KG/M2 | DIASTOLIC BLOOD PRESSURE: 73 MMHG | TEMPERATURE: 96.9 F | HEART RATE: 88 BPM

## 2020-05-13 DIAGNOSIS — F41.3 OTHER MIXED ANXIETY DISORDERS: Primary | ICD-10-CM

## 2020-05-13 PROBLEM — R45.851 SUICIDAL IDEATIONS: Status: ACTIVE | Noted: 2020-05-13

## 2020-05-13 PROBLEM — F10.929 ALCOHOLIC INTOXICATION WITH COMPLICATION (HCC): Status: RESOLVED | Noted: 2020-05-11 | Resolved: 2020-05-13

## 2020-05-13 PROBLEM — T14.91XA SUICIDE ATTEMPT (HCC): Status: ACTIVE | Noted: 2020-05-13

## 2020-05-13 LAB
GLUCOSE BLDC GLUCOMTR-MCNC: 126 MG/DL (ref 70–130)
GLUCOSE BLDC GLUCOMTR-MCNC: 128 MG/DL (ref 70–130)

## 2020-05-13 PROCEDURE — 96372 THER/PROPH/DIAG INJ SC/IM: CPT

## 2020-05-13 PROCEDURE — G0378 HOSPITAL OBSERVATION PER HR: HCPCS

## 2020-05-13 PROCEDURE — 82962 GLUCOSE BLOOD TEST: CPT

## 2020-05-13 PROCEDURE — 25010000002 ENOXAPARIN PER 10 MG: Performed by: STUDENT IN AN ORGANIZED HEALTH CARE EDUCATION/TRAINING PROGRAM

## 2020-05-13 PROCEDURE — 99213 OFFICE O/P EST LOW 20 MIN: CPT | Performed by: PSYCHIATRY & NEUROLOGY

## 2020-05-13 PROCEDURE — 99225 PR SBSQ OBSERVATION CARE/DAY 25 MINUTES: CPT | Performed by: STUDENT IN AN ORGANIZED HEALTH CARE EDUCATION/TRAINING PROGRAM

## 2020-05-13 RX ORDER — FAMOTIDINE 20 MG/1
20 TABLET, FILM COATED ORAL 2 TIMES DAILY PRN
Status: DISCONTINUED | OUTPATIENT
Start: 2020-05-13 | End: 2020-05-15 | Stop reason: HOSPADM

## 2020-05-13 RX ORDER — LOPERAMIDE HYDROCHLORIDE 2 MG/1
2 CAPSULE ORAL
Status: DISCONTINUED | OUTPATIENT
Start: 2020-05-13 | End: 2020-05-15 | Stop reason: HOSPADM

## 2020-05-13 RX ORDER — LANOLIN ALCOHOL/MO/W.PET/CERES
3 CREAM (GRAM) TOPICAL NIGHTLY
Status: DISCONTINUED | OUTPATIENT
Start: 2020-05-13 | End: 2020-05-15 | Stop reason: HOSPADM

## 2020-05-13 RX ORDER — ATORVASTATIN CALCIUM 20 MG/1
20 TABLET, FILM COATED ORAL NIGHTLY
Status: DISCONTINUED | OUTPATIENT
Start: 2020-05-13 | End: 2020-05-15 | Stop reason: HOSPADM

## 2020-05-13 RX ORDER — TOPIRAMATE 25 MG/1
25 TABLET ORAL NIGHTLY
Status: DISCONTINUED | OUTPATIENT
Start: 2020-05-13 | End: 2020-05-15 | Stop reason: HOSPADM

## 2020-05-13 RX ORDER — ONDANSETRON 4 MG/1
8 TABLET, FILM COATED ORAL EVERY 6 HOURS PRN
Status: DISCONTINUED | OUTPATIENT
Start: 2020-05-13 | End: 2020-05-15 | Stop reason: HOSPADM

## 2020-05-13 RX ORDER — ALPRAZOLAM 2 MG/1
2 TABLET ORAL 3 TIMES DAILY PRN
COMMUNITY
End: 2020-05-15 | Stop reason: HOSPADM

## 2020-05-13 RX ORDER — HYDROXYZINE PAMOATE 25 MG/1
25 CAPSULE ORAL 3 TIMES DAILY PRN
COMMUNITY

## 2020-05-13 RX ORDER — ACETAMINOPHEN 325 MG/1
650 TABLET ORAL EVERY 4 HOURS PRN
Status: DISCONTINUED | OUTPATIENT
Start: 2020-05-13 | End: 2020-05-15 | Stop reason: HOSPADM

## 2020-05-13 RX ORDER — TRAZODONE HYDROCHLORIDE 50 MG/1
50 TABLET ORAL NIGHTLY PRN
Status: DISCONTINUED | OUTPATIENT
Start: 2020-05-13 | End: 2020-05-15 | Stop reason: HOSPADM

## 2020-05-13 RX ORDER — CLOPIDOGREL BISULFATE 75 MG/1
75 TABLET ORAL DAILY
Status: DISCONTINUED | OUTPATIENT
Start: 2020-05-13 | End: 2020-05-15 | Stop reason: HOSPADM

## 2020-05-13 RX ORDER — BUSPIRONE HYDROCHLORIDE 10 MG/1
10 TABLET ORAL 2 TIMES DAILY
Status: DISCONTINUED | OUTPATIENT
Start: 2020-05-13 | End: 2020-05-15 | Stop reason: HOSPADM

## 2020-05-13 RX ORDER — CLONIDINE HYDROCHLORIDE 0.1 MG/1
0.1 TABLET ORAL EVERY 4 HOURS PRN
Status: DISCONTINUED | OUTPATIENT
Start: 2020-05-13 | End: 2020-05-15 | Stop reason: HOSPADM

## 2020-05-13 RX ORDER — ATENOLOL 25 MG/1
50 TABLET ORAL DAILY
Status: DISCONTINUED | OUTPATIENT
Start: 2020-05-14 | End: 2020-05-15 | Stop reason: HOSPADM

## 2020-05-13 RX ORDER — LOSARTAN POTASSIUM 50 MG/1
100 TABLET ORAL DAILY
Status: DISCONTINUED | OUTPATIENT
Start: 2020-05-14 | End: 2020-05-15 | Stop reason: HOSPADM

## 2020-05-13 RX ORDER — ZOLPIDEM TARTRATE 10 MG/1
10 TABLET ORAL NIGHTLY PRN
COMMUNITY
End: 2020-05-15 | Stop reason: HOSPADM

## 2020-05-13 RX ORDER — RIVASTIGMINE TARTRATE 6 MG/1
6 CAPSULE ORAL 2 TIMES DAILY
COMMUNITY

## 2020-05-13 RX ORDER — HYDROXYZINE PAMOATE 50 MG/1
50 CAPSULE ORAL EVERY 6 HOURS PRN
Status: DISCONTINUED | OUTPATIENT
Start: 2020-05-13 | End: 2020-05-15 | Stop reason: HOSPADM

## 2020-05-13 RX ORDER — DULOXETIN HYDROCHLORIDE 20 MG/1
40 CAPSULE, DELAYED RELEASE ORAL DAILY
Status: DISCONTINUED | OUTPATIENT
Start: 2020-05-14 | End: 2020-05-15 | Stop reason: HOSPADM

## 2020-05-13 RX ORDER — HYDROCHLOROTHIAZIDE 12.5 MG/1
25 TABLET ORAL DAILY
Status: DISCONTINUED | OUTPATIENT
Start: 2020-05-14 | End: 2020-05-15 | Stop reason: HOSPADM

## 2020-05-13 RX ORDER — ASPIRIN 81 MG/1
81 TABLET, CHEWABLE ORAL DAILY
Status: DISCONTINUED | OUTPATIENT
Start: 2020-05-13 | End: 2020-05-15 | Stop reason: HOSPADM

## 2020-05-13 RX ORDER — TERAZOSIN 2 MG/1
2 CAPSULE ORAL NIGHTLY
Status: DISCONTINUED | OUTPATIENT
Start: 2020-05-13 | End: 2020-05-15 | Stop reason: HOSPADM

## 2020-05-13 RX ORDER — DONEPEZIL HYDROCHLORIDE 5 MG/1
5 TABLET, FILM COATED ORAL NIGHTLY
Status: DISCONTINUED | OUTPATIENT
Start: 2020-05-13 | End: 2020-05-15 | Stop reason: HOSPADM

## 2020-05-13 RX ORDER — CLONAZEPAM 0.5 MG/1
0.5 TABLET ORAL 2 TIMES DAILY
Status: DISCONTINUED | OUTPATIENT
Start: 2020-05-13 | End: 2020-05-15 | Stop reason: HOSPADM

## 2020-05-13 RX ORDER — PANTOPRAZOLE SODIUM 40 MG/1
40 TABLET, DELAYED RELEASE ORAL EVERY MORNING
Status: DISCONTINUED | OUTPATIENT
Start: 2020-05-14 | End: 2020-05-15 | Stop reason: HOSPADM

## 2020-05-13 RX ADMIN — QUETIAPINE 150 MG: 100 TABLET ORAL at 20:27

## 2020-05-13 RX ADMIN — DONEPEZIL HYDROCHLORIDE 5 MG: 5 TABLET, FILM COATED ORAL at 20:28

## 2020-05-13 RX ADMIN — TOPIRAMATE 25 MG: 25 TABLET, FILM COATED ORAL at 20:28

## 2020-05-13 RX ADMIN — HYDROCHLOROTHIAZIDE 25 MG: 25 TABLET ORAL at 08:52

## 2020-05-13 RX ADMIN — TERAZOSIN HYDROCHLORIDE 2 MG: 2 CAPSULE ORAL at 08:52

## 2020-05-13 RX ADMIN — CLONAZEPAM 0.75 MG: 0.5 TABLET ORAL at 08:52

## 2020-05-13 RX ADMIN — TERAZOSIN HYDROCHLORIDE ANHYDROUS 2 MG: 2 CAPSULE ORAL at 20:28

## 2020-05-13 RX ADMIN — ATORVASTATIN CALCIUM 20 MG: 20 TABLET, FILM COATED ORAL at 20:28

## 2020-05-13 RX ADMIN — PANTOPRAZOLE SODIUM 40 MG: 40 TABLET, DELAYED RELEASE ORAL at 08:51

## 2020-05-13 RX ADMIN — LOSARTAN POTASSIUM 100 MG: 50 TABLET ORAL at 08:52

## 2020-05-13 RX ADMIN — HYDROXYZINE PAMOATE 50 MG: 50 CAPSULE ORAL at 20:30

## 2020-05-13 RX ADMIN — BUSPIRONE HYDROCHLORIDE 10 MG: 10 TABLET ORAL at 20:28

## 2020-05-13 RX ADMIN — ASPIRIN 81 MG 81 MG: 81 TABLET ORAL at 20:28

## 2020-05-13 RX ADMIN — MELATONIN 3 MG: at 20:28

## 2020-05-13 RX ADMIN — METOPROLOL SUCCINATE 50 MG: 50 TABLET, EXTENDED RELEASE ORAL at 08:52

## 2020-05-13 RX ADMIN — DULOXETINE HYDROCHLORIDE 60 MG: 60 CAPSULE, DELAYED RELEASE ORAL at 08:51

## 2020-05-13 RX ADMIN — BUSPIRONE HYDROCHLORIDE 10 MG: 10 TABLET ORAL at 08:52

## 2020-05-13 RX ADMIN — CLONAZEPAM 0.5 MG: 0.5 TABLET ORAL at 20:27

## 2020-05-13 RX ADMIN — SODIUM CHLORIDE, PRESERVATIVE FREE 10 ML: 5 INJECTION INTRAVENOUS at 08:53

## 2020-05-13 RX ADMIN — CLOPIDOGREL BISULFATE 75 MG: 75 TABLET ORAL at 08:52

## 2020-05-13 RX ADMIN — ENOXAPARIN SODIUM 40 MG: 40 INJECTION SUBCUTANEOUS at 08:52

## 2020-05-13 NOTE — NURSING NOTE
Explained to pt with veronique Paul RN that Dr. Rodríguez will be down for rounds as soon as he could and the 72 hour hold would not be up until tomorrow morning per Dr. Rodríguez.  Pt very calm and cooperate at this time.  States that he would feel better if he could shave and take a shower.  Told pt he would be allowed to shower and shave with the help of ENEIDA velasco.  BURAK Paul agrees.

## 2020-05-13 NOTE — PROGRESS NOTES
"Psychiatry Progress Note   5/13/2020      HD: #0  Legal: Hold expiring 5.14.2020    Chief Complaint: Suicidal Ideation, Status Post Suicide Attempt and agitation      Subjective --  Mr. Nick Abreu is a 55 y.o. male who was seen on the 3W unit.      Pt is initially pleasant today.  He is somewhat better engaged.  Seen with his 1:1 obs.     Pt reports that he is \"ready to go home.\"  When I attempted to further explore his reasons for going home, he becomes less engaged and more irritable.      He continues to profoundly minimize and normalize the gravity of his situation.  He states \"everything is fine\" but is either unable or unwilling to discuss how this will be so in any coherent or linear fashion.  Instead he becomes more irritable and angry.      He reports that he would rather go to [facility] than come to [facility].  I discussed this would not be the case, due to his having a health condition.  He states that he will need the \"police\" and others to \"get\" him to the [facility].  I further reflected on his behaviors, statements of aggression and threats, and contrasted this to his statement that everything is fine.  He does not meaningfully engage at this point.      He has a notable locus of control.  States all blame for his being in hospital is mine.  When I attempted to reflect back on how he came to hospital, he further cites external narratives of control.  States that I have \"ruined\" his life, though is not able to describe why.  Significant themes of idealization and de-evaluation appreciable as well.      Attempted to de-escalate and engage but not helpful.    Later discussed with pt, that if he is willing to be a voluntary patient, could consider shorter course, and he refused to give an answer.  I reviewed with him his behaviors and statements here, along with my concerns for his severe behavioral instability, as evidenced by his behaviors & statements.  He states he would not be voluntary.  I discussed " that the other option would be to move forward with the 202 A process and involvement of the court system.  He acknowledged this but then did not further engage.          Objective   Objective --      Vital Signs:  Temp:  [96.2 °F (35.7 °C)-98.2 °F (36.8 °C)] 96.9 °F (36.1 °C)  Heart Rate:  [66-88] 88  Resp:  [18] 18  BP: ()/(58-77) 114/73    Physical Exam:   -General Appearance:  uncooperative and in NAD  -Hygiene:  Adequate   -Gait & Station:  Blank multiple: Deferred, in bed  -Musculoskeletal:  No tremors or abnormal involuntary movements and No Cog Hamilton or Rigidity and No atrophy noted  -Pulm: unlaboured     Mental Status Exam:   --Cooperation:  Guarded and Minimally cooperative  --Eye Contact:  Non-sustained  --Psychomotor Behavior:  Slow  --Mood:  Angry and Irritable  --Affect:  inappropriate, increased in intensity and guarded  --Speech:  yells at times, not pressured  --Thought Process:  [place]  --Associations: Goal Directed and Circumstantial  --Themes:  Anger and Rage; minimization and normalization of situation; external locus of control; idealization and de-evaluation  --Thought Content:     --Mood congruent   --Suicidal:  denies despite overdose reports in ED    --Homicidal:  Denies   --Hallucinations:  Denies and Not appearing to respond to internal stimuli   --Delusion:  None noted/overt and No overt psychotic overlay  --Cognitive Functioning:   --Consciousness: awake and alert, Attention:  Distractible and Concentration:  Distractible  --Reliability:  impaired  --Insight:  Poor  --Judgment:  Impaired  --Impulse Control:  Impaired      Lab Results (last 24 hours)     Procedure Component Value Units Date/Time    POC Glucose Once [[id number]]  (Normal) Collected:  05/13/20 1017    Specimen:  Blood Updated:  05/13/20 1043     Glucose 128 mg/dL      Comment: RN NotifiedOperator: [id number] PHYLLIS BRANDYMeter ID: [id number]       POC Glucose Once [[id number]]  (Normal) Collected:  05/13/20 0619       Specimen:  Blood Updated:  05/13/20 0635     Glucose 126 mg/dL      Comment: RN NotifiedOperator: [id number] JD Momin ID: [id number]       POC Glucose Once [[id number]]  (Normal) Collected:  05/12/20 1934    Specimen:  Blood Updated:  05/12/20 1947     Glucose 121 mg/dL      Comment: Result Not ConfirmedOperator: [id number] FREDERICK Haddader ID: [id number]       POC Glucose Once [[id number]]  (Normal) Collected:  05/12/20 1619    Specimen:  Blood Updated:  05/12/20 1940     Glucose 129 mg/dL      Comment: RN NotifiedOperator: [id number] IGNACIO VICKERYMeter ID: [id number]       POC Glucose Once [[id number]]  (Abnormal) Collected:  05/12/20 1032    Specimen:  Blood Updated:  05/12/20 1939     Glucose 177 mg/dL      Comment: RN NotifiedOperator: [id number] IGNACIO VICKERYMeter ID: [id number]             Imaging Results (Last 24 Hours)     ** No results found for the last 24 hours. **            Medications:   Scheduled Meds:    atorvastatin 20 mg Oral Nightly   busPIRone 10 mg Oral BID   clonazePAM 0.75 mg Oral BID   clopidogrel 75 mg Oral Daily   DULoxetine 60 mg Oral Daily   enoxaparin 40 mg Subcutaneous Q24H   hydroCHLOROthiazide 25 mg Oral Daily   insulin aspart 0-7 Units Subcutaneous TID AC   losartan 100 mg Oral Daily   metoprolol succinate XL 50 mg Oral Daily   pantoprazole 40 mg Oral Daily   QUEtiapine 150 mg Oral Nightly   sodium chloride 10 mL Intravenous Q12H   terazosin 2 mg Oral Q12H     Continuous Infusions:    sodium chloride 125 mL/hr Last Rate: 125 mL/hr (05/12/20 0901)     PRN Meds:.•  acetaminophen  •  aluminum-magnesium hydroxide-simethicone  •  dextrose  •  dextrose  •  glucagon (human recombinant)  •  ondansetron  •  [COMPLETED] Insert peripheral IV **AND** sodium chloride  •  sodium chloride      Assessment:     Suicidal ideation    Severe episode of recurrent major depressive disorder, with psychotic features (CMS/HCC)    Anxiety disorder    Major neurocognitive disorder  (CMS/HCC)    Alcoholic intoxication with complication (CMS/HCC)    Diabetes mellitus (CMS/HCC)    Class 1 obesity due to excess calories with serious comorbidity and body mass index (BMI) of 34.0 to 34.9 in adult    Alcohol Use D/O, binge use per pt and hx       Treatment Plan:  1) Concur with 1:1 obs and SI precautions    2.) Cont Cymbalta, buspar and seroquel at current doses for affective sx.    3.) Decrease Klonopin to 0.75mg BID give disinhibition and EtOH abuse; plan to taper to 0.5mg BID tomorrow.      4.) Will need to transfer to [facility] after stabilization and bed becomes available, likely later today.      --> Dispostion Planning: to [facility]    Casimiro Rodríguez II, MD  05/13/20 @ 15:50  Dictated using Dragon.

## 2020-05-13 NOTE — PROGRESS NOTES
FAMILY MEDICINE DAILY PROGRESS NOTE    NAME: Nick Abreu  : 1965  MRN: [id number]      LOS: 0 days     PROVIDER OF SERVICE: Johan Chappell Jr, MD    Chief Complaint: Suicidal ideation    Subjective:     Interval History:  History taken from: patient chart  No events overnight.  Still on suicide precautions.  Likely to psych floor today.     Review of Systems:   Review of Systems   Constitutional: Negative for chills, diaphoresis, fatigue and fever.   HENT: Negative for ear pain and sore throat.    Eyes: Negative for pain and visual disturbance.   Respiratory: Negative for chest tightness and shortness of breath.    Cardiovascular: Negative for chest pain and palpitations.   Gastrointestinal: Negative for abdominal distention, abdominal pain, diarrhea and nausea.   Endocrine: Negative for polydipsia and polyuria.   Genitourinary: Negative for dysuria, flank pain and hematuria.   Musculoskeletal: Negative for arthralgias, joint swelling and myalgias.   Skin: Negative for color change and pallor.   Neurological: Negative for dizziness, seizures and speech difficulty.   Hematological: Negative for adenopathy. Does not bruise/bleed easily.   Psychiatric/Behavioral: Negative for agitation, confusion and suicidal ideas (Denies at this time).       Objective:     Vital Signs  Temp:  [96.2 °F (35.7 °C)-98.2 °F (36.8 °C)] 96.2 °F (35.7 °C)  Heart Rate:  [66-86] 75  Resp:  [18] 18  BP: ()/(63-77) 119/74  Body mass index is 31.55 kg/m².    Physical Exam  Physical Exam   Constitutional: He is oriented to person, place, and time. He appears well-developed and well-nourished. No distress.   HENT:   Head: Normocephalic and atraumatic.   Right Ear: External ear normal.   Left Ear: External ear normal.   Nose: Nose normal.   Mouth/Throat: Oropharynx is clear and moist. No oropharyngeal exudate.   Eyes: Pupils are equal, round, and reactive to light. Conjunctivae and EOM are normal. Right eye exhibits no  discharge. Left eye exhibits no discharge. No scleral icterus.   Neck: Normal range of motion. Neck supple. No tracheal deviation present. No thyromegaly present.   Cardiovascular: Normal rate, regular rhythm, S1 normal, S2 normal, normal heart sounds and intact distal pulses. Exam reveals no gallop and no friction rub.   No murmur heard.  Pulmonary/Chest: Effort normal and breath sounds normal. No stridor. No respiratory distress. He has no wheezes. He has no rales. He exhibits no tenderness.   Abdominal: Soft. Bowel sounds are normal. He exhibits no distension. There is no tenderness.   Musculoskeletal: Normal range of motion. He exhibits no edema, tenderness or deformity.   Neurological: He is alert and oriented to person, place, and time. No cranial nerve deficit.   Skin: Skin is warm and dry. Capillary refill takes less than 2 seconds. No rash noted. He is not diaphoretic. No erythema.   Psychiatric: He has a normal mood and affect. His behavior is normal. He expresses no suicidal plans and no homicidal plans.   Vitals reviewed.      Medication Review    Current Facility-Administered Medications:   •  acetaminophen (TYLENOL) tablet 650 mg, 650 mg, Oral, Q6H PRN, Javier Hayward MD, 650 mg at 05/11/20 1709  •  aluminum-magnesium hydroxide-simethicone (MAALOX MAX) 400-400-40 MG/5ML suspension 15 mL, 15 mL, Oral, Q6H PRN, Greg Mullen MD  •  atorvastatin (LIPITOR) tablet 20 mg, 20 mg, Oral, Nightly, Greg Mullen MD, 20 mg at 05/12/20 2125  •  busPIRone (BUSPAR) tablet 10 mg, 10 mg, Oral, BID, Greg Mullen MD, 10 mg at 05/12/20 2125  •  clonazePAM (KlonoPIN) tablet 0.75 mg, 0.75 mg, Oral, BID, Casimiro Rodríguez II, MD, 0.75 mg at 05/12/20 2124  •  clopidogrel (PLAVIX) tablet 75 mg, 75 mg, Oral, Daily, Greg Mullen MD, 75 mg at 05/12/20 0900  •  dextrose (D50W) 25 g/ 50mL Intravenous Solution 25 g, 25 g, Intravenous, Q15 Min PRN, Greg Mullen MD  •  dextrose  (GLUTOSE) oral gel 15 g, 15 g, Oral, Q15 Min PRN, Greg Mullen MD  •  DULoxetine (CYMBALTA) DR capsule 60 mg, 60 mg, Oral, Daily, Greg Mullen MD, 60 mg at 05/12/20 0900  •  enoxaparin (LOVENOX) syringe 40 mg, 40 mg, Subcutaneous, Q24H, Greg Mullen MD, 40 mg at 05/12/20 0857  •  glucagon (human recombinant) (GLUCAGEN DIAGNOSTIC) injection 1 mg, 1 mg, Subcutaneous, Q15 Min PRN, Greg Mullen MD  •  hydroCHLOROthiazide (HYDRODIURIL) tablet 25 mg, 25 mg, Oral, Daily, Greg Mullen MD, 25 mg at 05/11/20 0911  •  insulin aspart (novoLOG) injection 0-7 Units, 0-7 Units, Subcutaneous, TID AC, Greg Mullen MD  •  losartan (COZAAR) tablet 100 mg, 100 mg, Oral, Daily, Greg Mullen MD, 100 mg at 05/12/20 0902  •  metoprolol succinate XL (TOPROL-XL) 24 hr tablet 50 mg, 50 mg, Oral, Daily, Greg Mullen MD, 50 mg at 05/12/20 0900  •  ondansetron (ZOFRAN) injection 4 mg, 4 mg, Intravenous, Q6H PRN, Greg Mullen MD  •  pantoprazole (PROTONIX) EC tablet 40 mg, 40 mg, Oral, Daily, Greg Mullen MD, 40 mg at 05/12/20 0859  •  QUEtiapine (SEROquel) tablet 150 mg, 150 mg, Oral, Nightly, Greg Mullen MD  •  sodium chloride 0.45 % infusion, 125 mL/hr, Intravenous, Continuous, Greg Mullen MD, Last Rate: 125 mL/hr at 05/12/20 0901, 125 mL/hr at 05/12/20 0901  •  [COMPLETED] Insert peripheral IV, , , Once **AND** sodium chloride 0.9 % flush 10 mL, 10 mL, Intravenous, PRN, Greg Mullen MD  •  sodium chloride 0.9 % flush 10 mL, 10 mL, Intravenous, Q12H, Greg Mullen MD, 10 mL at 05/12/20 0901  •  sodium chloride 0.9 % flush 10 mL, 10 mL, Intravenous, PRN, Greg Mullen MD  •  terazosin (HYTRIN) capsule 2 mg, 2 mg, Oral, Q12H, Greg Mullen MD, 2 mg at 05/12/20 7686     Diagnostic Data    Lab Results (last 24 hours)     Procedure Component Value Units Date/Time    POC Glucose Once [[id number]]  (Normal)  Collected:  05/13/20 0619    Specimen:  Blood Updated:  05/13/20 0635     Glucose 126 mg/dL      Comment: RN NotifiedOperator: [id number] JD Momin ID: [id number]       POC Glucose Once [[id number]]  (Normal) Collected:  05/12/20 1934    Specimen:  Blood Updated:  05/12/20 1947     Glucose 121 mg/dL      Comment: Result Not ConfirmedOperator: [id number] FREDERICK Holcomb ID: [id number]       POC Glucose Once [[id number]]  (Normal) Collected:  05/12/20 1619    Specimen:  Blood Updated:  05/12/20 1940     Glucose 129 mg/dL      Comment: RN NotifiedOperator: [id number] IGNACIO VICKERYMeter ID: [id number]       POC Glucose Once [[id number]]  (Abnormal) Collected:  05/12/20 1032    Specimen:  Blood Updated:  05/12/20 1939     Glucose 177 mg/dL      Comment: RN NotifiedOperator: [id number] IGNACIO VICKERYMeter ID: [id number]       POC Glucose Once [[id number]]  (Normal) Collected:  05/12/20 0858    Specimen:  Blood Updated:  05/12/20 0919     Glucose 129 mg/dL      Comment: RN NotifiedOperator: [id number] CHRISTIANO Duncan ID: [id number]       Ethanol [[id number]] Collected:  05/12/20 0803    Specimen:  Blood Updated:  05/12/20 0829     Ethanol <10 mg/dL      Ethanol % <0.010 %             I reviewed the patient's new clinical results.    Assessment/Plan:     Active Hospital Problems    Diagnosis POA   • **Suicidal ideation [R45.851] Not Applicable     Patient called suicide hotline who were concerned enough with his call that they called EMS to take to ED.  - Last Ethanol 36  - UDS  - psych to re-evaluate today     • Alcoholic intoxication with complication (CMS/HCC) [F10.929] Yes     Presenting EtOH elevated to 255. Became aggressive with security while in ED requiring IM geodon.   - last Ethanol 36, will repeat this AM     • Diabetes mellitus (CMS/HCC) [E11.9] Yes     History of DM. Unknown type. Last A1c 2014 5.4%  - Repeat A1c  - glucose fingersticks ACTID  - SSI  - consider antibody screen to delineate  Type 1 vs Type 2     • Class 1 obesity due to excess calories with serious comorbidity and body mass index (BMI) of 34.0 to 34.9 in adult [E66.09, Z68.34] Not Applicable     Nutrition consult     • Anxiety disorder [F41.9] Yes     Medications list includes SSRI, SNRI, and benzodiazepines. Will restart medicaitons based on last psychiatric hospitalizaton.  - cymbalta 60 mg qd  - seroquel 150 mg qhs  - terazosin 2 mg bid  - klonopin 1 mg bid     • Major neurocognitive disorder (CMS/HCC) [F01.50] Yes     Continue donepezil      • Severe episode of recurrent major depressive disorder, with psychotic features (CMS/HCC) [F33.3] Yes     Medication list includes SSRI and SNRI. Will restart medications as listed in last psychiatric hospitalization  - cymbalta 60 mg qd  - seroquel 150 mg qhs  - terazosin 2 mg bid         DVT prophylaxis: SCDs/TEDs  Code status is   Code Status and Medical Interventions:   Ordered at: 05/11/20 0435     Level Of Support Discussed With:    Patient     Code Status:    CPR     Medical Interventions (Level of Support Prior to Arrest):    Full       Plan for disposition:Where: home vs psych floor and When:  today      Time: 25 min     Johan Chappell Jr., M.D.  PGY3  Family Medicine Resident  [address] [place] [address]  Phone: [phone]  Fax: [phone]      This document has been electronically signed by Johan Chappell Jr, MD on 05/12/20 8:00 AM

## 2020-05-13 NOTE — NURSING NOTE
Review of Current Symptoms--only current symptoms        · General   Good general health lately    · Eyes    None     · ENT/Mouth    None    · Cardio    None    · Resp    None    · GI     None    ·     None    · MS    None    · Skin/Hair/Nails    None    · Neuro    None

## 2020-05-13 NOTE — NURSING NOTE
Pt admitted to the [facility] from [facility] for a suicide attempt. Apparently pt called the poison hotline 2 days ago and told them he took Xanax. He now says he only did it for attention and he is not suicidal. Pt is very angry because he was brought to the U instead of going home. He was escorted by security and [facility] staff. No contraband found. Admission in process of being completed.

## 2020-05-14 VITALS
WEIGHT: 240 LBS | HEART RATE: 75 BPM | TEMPERATURE: 99.8 F | RESPIRATION RATE: 18 BRPM | BODY MASS INDEX: 31.81 KG/M2 | HEIGHT: 73 IN | SYSTOLIC BLOOD PRESSURE: 96 MMHG | DIASTOLIC BLOOD PRESSURE: 59 MMHG | OXYGEN SATURATION: 95 %

## 2020-05-14 PROCEDURE — 99232 SBSQ HOSP IP/OBS MODERATE 35: CPT | Performed by: FAMILY MEDICINE

## 2020-05-14 PROCEDURE — 99223 1ST HOSP IP/OBS HIGH 75: CPT | Performed by: PSYCHIATRY & NEUROLOGY

## 2020-05-14 PROCEDURE — 90833 PSYTX W PT W E/M 30 MIN: CPT | Performed by: PSYCHIATRY & NEUROLOGY

## 2020-05-14 RX ORDER — RIVASTIGMINE TARTRATE 1.5 MG/1
6 CAPSULE ORAL 2 TIMES DAILY WITH MEALS
Status: DISCONTINUED | OUTPATIENT
Start: 2020-05-14 | End: 2020-05-15 | Stop reason: HOSPADM

## 2020-05-14 RX ADMIN — LOSARTAN POTASSIUM 100 MG: 50 TABLET ORAL at 08:09

## 2020-05-14 RX ADMIN — ATORVASTATIN CALCIUM 20 MG: 20 TABLET, FILM COATED ORAL at 20:56

## 2020-05-14 RX ADMIN — DULOXETINE HYDROCHLORIDE 40 MG: 20 CAPSULE, DELAYED RELEASE ORAL at 08:09

## 2020-05-14 RX ADMIN — BUSPIRONE HYDROCHLORIDE 10 MG: 10 TABLET ORAL at 20:53

## 2020-05-14 RX ADMIN — CLOPIDOGREL BISULFATE 75 MG: 75 TABLET ORAL at 08:09

## 2020-05-14 RX ADMIN — CLONAZEPAM 0.5 MG: 0.5 TABLET ORAL at 08:11

## 2020-05-14 RX ADMIN — TERAZOSIN HYDROCHLORIDE ANHYDROUS 2 MG: 2 CAPSULE ORAL at 20:53

## 2020-05-14 RX ADMIN — RIVASTIGMINE TARTRATE 6 MG: 1.5 CAPSULE ORAL at 20:53

## 2020-05-14 RX ADMIN — ATENOLOL 50 MG: 25 TABLET ORAL at 08:10

## 2020-05-14 RX ADMIN — TOPIRAMATE 25 MG: 25 TABLET, FILM COATED ORAL at 20:53

## 2020-05-14 RX ADMIN — PANTOPRAZOLE SODIUM 40 MG: 40 TABLET, DELAYED RELEASE ORAL at 08:09

## 2020-05-14 RX ADMIN — HYDROCHLOROTHIAZIDE 25 MG: 12.5 TABLET ORAL at 08:09

## 2020-05-14 RX ADMIN — DONEPEZIL HYDROCHLORIDE 5 MG: 5 TABLET, FILM COATED ORAL at 20:53

## 2020-05-14 RX ADMIN — CLONAZEPAM 0.5 MG: 0.5 TABLET ORAL at 20:53

## 2020-05-14 RX ADMIN — HYDROXYZINE PAMOATE 50 MG: 50 CAPSULE ORAL at 20:53

## 2020-05-14 RX ADMIN — ASPIRIN 81 MG 81 MG: 81 TABLET ORAL at 08:10

## 2020-05-14 RX ADMIN — BUSPIRONE HYDROCHLORIDE 10 MG: 10 TABLET ORAL at 08:11

## 2020-05-14 RX ADMIN — MELATONIN 3 MG: at 20:53

## 2020-05-14 NOTE — NURSING NOTE
"Behavior   Note any precipitants to event or behavior   Describe level and action of any aggressive behavior or speech and associated interventions.     Anxiety: Patient denies at this time  Depression: Patient denies at this time  Pain  0  AVH   no  S/I   no  Plan  no  H/I   no  Plan  no    Affect   labile      Note:pt seen in personal room, has been out of room and has spoken to peers with ease.  Pt denies any and all issues.  Pt appears angry, but is able to hold a conversation without escalating.  Pt took all medication, states he needs a hydroxyzine for itch, and pointed out area on right upper inner arm that appeared reddened and irritated.  Pt states he takes vistaril 50mg three times daily for this.  Pt also requested exelon, states he has been taking this \"for years.\"  On call physician was contacted as this has not been ordered, and states to confer with dr. palafox in the am.  Pt remained in bed and is asleep at this time.      Intervention    PRN medication utilized:  yes - hydroxyzine    Instructed in medication usage and effects  Medications administered as ordered  Encouraged to verbalize needs      Response    Verbalized understanding   Did patient take medications as ordered yes   Did patient interact with assessment?  yes     Plan    Will monitor for safety  Will monitor every 15 minutes as ordered  Will evaluate and promote the plan of care    Last BM:  unknown date  (Please chart in I/O as well)  "

## 2020-05-14 NOTE — H&P
"Psychiatric & Behavioral Health History & Physical  5/14/2020    --> Source of History: chart review and the patient; staff    --> Chief Complaint: Suicidal Ideation, Status Post Suicide Attempt and Substance Abuse      History of Present Illness:  Mr. Nick Abreu is a 55 y.o. male with a concurrent neuropsychiatric history notable for EtOH abuse, MDD, Neurocognitive d/o.      Pt transferred from the [facility] service s/p overdose attempt and EtOH intoxification.      Presents with suicide attempt and substance abuse. Onset of symptoms was abrupt starting several days ago.  Symptoms have been present on an intermittent basis. Symptoms are associated with substance use.  Symptoms are aggravated by problems with substance abuse.   Symptoms improve with sobriety.  Patient's symptom severity is moderate.   Patient's symptoms occur in the context of chronic illness.     Pt is more open today.  He is able to more coherently discuss events leading to his stay: interaction with an ex-wife, stress, binge EtOH use.  He is apologetic for his interactions on the hospitalist service.      He reports improving mood and anxiety.  Now denies any SI.  He does report overuse of his medication, states it was \"only a couple\" tabs of medication and his intention was not to kill himself.      We focused on discussing ways to cope.  Focusing on social support, as well.        Psychiatric Review Of Systems:  --Depression: +low mood.    --Anxiety: denies any excessive  --Psychosis: denies AVH or paranoia  --Lo: no hx consistent with lo or hypomanis      Concurrent Psychiatric History:  Past psychiatric history: Patient has been hospitalized numerous times in the past but states that he has not been in the hospital since here last March.  Pt has diagnosis of MDD and Alzheimer's disease  Patient also reports TBI from boxing injuries     Psychiatric Hospitalizations: Patient has had more than 5 prior " "hospitalizations.     Suicide Attempts: Patient has had 3  prior suicide attempts.     Prior Treatment and Medications Tried: Xanax, Klonopin, Restoril, Celexa, Aricept, Zyprexa, Risperdal, Seroquel, Depakote, and Prozac      History of violence or legal issues: He has a history of DUI and assault charges, also Meth related charge in 2018      -firearms: denies access      Substance Use:   --Nicotine: former user   --Caffeine: none daily   --EtOH: binge use, as above; no daily use or escalating use   --THC: remote hx   --Illicits: denies; has script for BZD      Social History:  --> ; on disability; denies financial stressors; lives in an apartment by himself  Social History     Socioeconomic History   • Marital status:      Spouse name: Not on file   • Number of children: Not on file   • Years of education: Not on file   • Highest education level: Not on file   Tobacco Use   • Smoking status: Former Smoker     Packs/day: 0.25     Years: 20.00     Pack years: 5.00     Last attempt to quit: 12/15/2019     Years since quittin.4   • Smokeless tobacco: Never Used   Substance and Sexual Activity   • Alcohol use: Yes   • Drug use: No     Comment: patient had RX for xanax   • Sexual activity: Defer   Social History Narrative    Substance Abuse: Alcohol: does not drink,  Cannabis: \"40yrs ago\", Methamphetamine: does not use, Opiate: does not use, Cocaine: does not use, Synthetic: does not use and IV drug use: denies; he has been on high dose benzodiazepine from prescription for years.  These were tapered off at the [facility] last month.  He restarted using xanax once he left the [place] and went back to see his doctor.    2018: States he began smoking around 1 year ago to stay awake while driving. Only smokes a few cigarettes per day. Often goes several days without smoking.         Marriages: 7    Current Relationships:  but living separately for about 3 yrs    Children: 3        Education: some " college     Occupation: on disability; he was a  for 27yrs. 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    Living Situation: he left the [place] about a week ago and has been staying at a motel.  He plans to get back with his estranged wife and move in with her.         Family History:  Family History   Problem Relation Age of Onset   • Bipolar disorder Mother    • Dementia Father      -->Further details: Family Suicides: denies      Past Medical and Surgical History:  Past Medical History:   Diagnosis Date   • Alzheimer disease (CMS/HCC)    • Anxiety    • Back pain, chronic    • Bipolar 1 disorder (CMS/HCC)    • Depression    • Depression    • Diabetes mellitus (CMS/HCC)    • GERD (gastroesophageal reflux disease)    • Head injury    • Hyperlipidemia    • Hypertension    • Injury of back    • Manic depression (CMS/HCC)    • Parkinson disease (CMS/HCC)    • Psychiatric complaint    • Psychosis (CMS/HCC)    • Suicide attempt (CMS/HCC)      --> Seizure Hx: denies  --> Head Injury w/ LOC: hx of such; was boxer      Past Surgical History:   Procedure Laterality Date   • BACK SURGERY     • CHOLECYSTECTOMY     • 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    Medications Prior to Admission   Medication Sig Dispense Refill Last Dose   • ALPRAZolam (XANAX) 2 MG tablet Take 2 mg by mouth 3 (Three) Times a Day As Needed for Anxiety.      • aspirin 81 MG chewable tablet Chew 1 tablet Daily. 30 tablet 0 Past Month at Unknown time   • atenolol (TENORMIN) 50 MG tablet Take 50 mg by mouth Daily.   Past Week at Unknown time   • atorvastatin (LIPITOR) 20 MG tablet Take 1 tablet by mouth Every Night. 30 tablet 0 Past Week at Unknown time   • donepezil (ARICEPT) 10 MG tablet Take 1 tablet by mouth Every Night. 30 tablet 0 Past Week at Unknown time   • hydrOXYzine pamoate (Vistaril) 25 MG capsule Take 25 mg by mouth 3 (Three) Times a Day As Needed for Itching or Allergies  "(pt states skin rash \" break outs \" when off this medication).      • losartan (COZAAR) 50 MG tablet Take 100 mg by mouth Daily.   Past Week at Unknown time   • omeprazole (priLOSEC) 40 MG capsule Take 40 mg by mouth Daily.   Past Week at Unknown time   • primidone (MYSOLINE) 50 MG tablet Take 50 mg by mouth Every Night.   Past Week at Unknown time   • rivastigmine (EXELON) 6 MG capsule Take 6 mg by mouth 2 (Two) Times a Day.      • topiramate (TOPAMAX) 50 MG tablet Take 50 mg by mouth 2 (Two) Times a Day.   Past Week at Unknown time   • zolpidem (AMBIEN) 10 MG tablet Take 10 mg by mouth At Night As Needed for Sleep.      • clopidogrel (PLAVIX) 75 MG tablet Take 1 tablet by mouth Daily. 30 tablet 0    • doxazosin (CARDURA) 2 MG tablet Take 2 mg by mouth Every Night.        --> Reviewed;      Medical Review Of Systems:  Reviewed review of systems from  Dr. Murdock's consult note from today.  Reviewed with additions made:  Constitutional: Negative for activity change, appetite change, fatigue and fever.   HENT: Negative for congestion, ear discharge, ear pain, facial swelling, hearing loss, nosebleeds, postnasal drip, rhinorrhea, sinus pressure, sore throat, tinnitus and trouble swallowing.    Eyes: Negative for pain, discharge and visual disturbance.   Respiratory: Negative for cough, shortness of breath and wheezing.    Cardiovascular: Negative for chest pain, palpitations and leg swelling.   Gastrointestinal: Negative for abdominal pain, blood in stool, constipation, diarrhea, nausea and vomiting.   Genitourinary: Negative for difficulty urinating, discharge, dysuria, flank pain, frequency, hematuria, penile pain, penile swelling, scrotal swelling, testicular pain and urgency.   Musculoskeletal: Positive for arthralgias. Negative for back pain, joint swelling, myalgias and neck pain.        Patient has significant right shoulder pain and has trouble moving it.  He denies any recent injury.   Skin: Negative for rash " and wound.   Neurological: Negative for dizziness, seizures, syncope, weakness, light-headedness and headaches.   Hematological: Negative for adenopathy.    Psychiatric: +EtOH use; negative for HI; others as above.       Objective   Objective --    Vital Signs:  Temp:  [97.7 °F (36.5 °C)-99.1 °F (37.3 °C)] 97.7 °F (36.5 °C)  Heart Rate:  [65-74] 65  Resp:  [18] 18  BP: (107-111)/(61-78) 111/61    Physical Exam:   -General Appearance:  normal general appearance, in no apparent distress and active  -Hygiene:  Adequate   -Gait & Station:  Blank multiple: Normal  -Musculoskeletal:  No tremors or abnormal involuntary movements and No Cog Crane or Rigidity and No atrophy noted  -Pulm: unlaboured     Mental Status Exam:   --Cooperation:  Cooperative  --Eye Contact:  Fair  --Psychomotor Behavior:  Appropriate  --Mood:  Worried and Improving  --Affect:  constricted and mood-congruent  --Speech:  Normal r/r/v  --Thought Process:  Coherent and Linear, Logical  --Associations: Goal Directed  --Themes:  Hope for Future and Self Improvement  --Thought Content:     --Mood congruent   --Suicidal:  denies    --Homicidal:  Denies   --Hallucinations:  Denies   --Delusion:  None noted/overt and No overt psychotic overlay  --Cognitive Functioning:   -Consciousness: awake and alert   -Orientation:  Person, Place, Time and Situation   -Attention:  Adequate    -Concentration:  Adequate   -Language:  Average based on interaction & Intact   -Vocabulary:  Average based on interaction & Intact   -Short Term Memory: Intact   -Long Term Memory: Intact   -Fund of Knowledge:  Average to Below Average based on interaction   -Abstraction:  Intact based on interaction  --Reliability:  adequate and improving  --Insight:  Improving  --Judgment:  Improving  --Impulse Control:  Improving      Diagnostic Data:    --> Lab Work  Recent Results (from the past 72 hour(s))   Ethanol    Collection Time: 05/12/20  8:03 AM   Result Value Ref Range    Ethanol  <10 0 - 10 mg/dL    Ethanol % <0.010 %   POC Glucose Once    Collection Time: 05/12/20  8:58 AM   Result Value Ref Range    Glucose 129 70 - 130 mg/dL   POC Glucose Once    Collection Time: 05/12/20 10:32 AM   Result Value Ref Range    Glucose 177 (H) 70 - 130 mg/dL   POC Glucose Once    Collection Time: 05/12/20  4:19 PM   Result Value Ref Range    Glucose 129 70 - 130 mg/dL   POC Glucose Once    Collection Time: 05/12/20  7:34 PM   Result Value Ref Range    Glucose 121 70 - 130 mg/dL   POC Glucose Once    Collection Time: 05/13/20  6:19 AM   Result Value Ref Range    Glucose 126 70 - 130 mg/dL   POC Glucose Once    Collection Time: 05/13/20 10:17 AM   Result Value Ref Range    Glucose 128 70 - 130 mg/dL       Xr Shoulder 2+ View Right    Result Date: 5/11/2020  Narrative: Right shoulder three view on 5/11/2020 CLINICAL INDICATION: Pain after fall COMPARISON: 8/23/2016 FINDINGS: There is evidence of calcified granulomatous disease in the right chest. The AC joint is well aligned. There is an old healed right distal clavicle diaphysis fracture. The glenohumeral joint is well located. There are no acute fractures.     Impression: No acute abnormality. Electronically signed by:  Harsha Armstrong  5/11/2020 4:10 AM [employer] Workstation: [id number]    Xr Knee 4+ View Right    Result Date: 5/11/2020  Narrative: Right knee four view on 5/11/2020 CLINICAL INDICATION: Pain after fall COMPARISON: None FINDINGS: There is calcification of the menisci consistent with chondrocalcinosis and CPPD. There is intramedullary alfonso traversing an old healed proximal tibia diaphysis fracture. No joint effusion is noted. There are no acute fractures. Visualized joints are well aligned.     Impression: Chronic findings as above with no acute abnormality. Electronically signed by:  Harsha Armstrong  5/11/2020 4:08 AM [employer] Workstation: [id number]    Ct Head Without Contrast    Result Date: 5/11/2020  Narrative: CT head without contrast on 5/11/2020  CLINICAL INDICATION: Head injury, per protocol for mechanism of injury TECHNIQUE: Multiple axial images are obtained throughout the head without the administration of contrast. This exam was performed according to our departmental dose-optimization program, which includes automated exposure control, adjustment of the mA and/or kV according to patient size and/or use of iterative reconstruction technique. Total DLP is 1063.8 mGy*cm. COMPARISON: 7/27/2019 FINDINGS: There is minimal low-density in the periventricular white matter consistent with minimal chronic small vessel ischemic changes. There is no hydrocephalus. There is no CT evidence of acute infarct. There is no hemorrhage. There are no abnormal extra-axial fluid collections. There is no mass, mass effect or midline shift. No bony abnormality is noted.     Impression: No acute intracranial abnormality. Electronically signed by:  Harsha Armstrong  5/11/2020 3:53 AM CDT Workstation: [id number]      Lab Results   Component Value Date    GLUF 97 03/31/2019        Lab Results   Component Value Date    HGBA1C 5.30 05/11/2020       Lab Results   Component Value Date    CHOL 100 03/31/2019    TRIG 109 03/31/2019    HDL 28 (L) 03/31/2019    LDL 50 03/31/2019    VLDL 21.8 03/31/2019    LDLHDL 1.79 03/31/2019        TSH   Date Value Ref Range Status   03/30/2019 1.130 0.270 - 4.200 mIU/mL Final       Lab Results   Component Value Date    UNWR56TQ 17.8 (L) 04/01/2019    ALBLDDOS01 398 04/01/2019    FOLATE 6.45 04/01/2019       No results found for: IRON, TIBC, FERRITIN      Assessment/Plan   --Patient Strengths: ability for insight, communication skills, compliant with medication   --Patient Barriers: marital/family conflict, no/few hobbies or interests      --Diagnostic Impression: Mr. Abreu  is a 55 y.o. male admitted for SI and concern for suicide attempt in setting of substance abuse and treatment on hospitalist service.  He is admitted voluntarily, focused on d/c  tomorrow.  He is agreeable to attending all groups and plan for outpt tx.  Have d/w him concern for Klonopin use and he is agreeable to reduction in dose.           Assessment:  --  Suicide attempt (CMS/HCC)    --  Severe episode of recurrent major depressive disorder, with psychotic features (CMS/HCC)    Anxiety disorder    Major neurocognitive disorder (CMS/HCC)    Binge Alcohol Use    Non-Psychiatric Medical Conditions Include:  --DM       Treatment Plan:  1) Will admit patient to the behavioral health unit at [facility], adult behavioral health unit, as a voluntary admission to ensure patient safety given risk status and need for inpatient stabilization and treatment.    2) Patient will be provided treatment with the unit milieu, activities, therapies and psychopharmacological management.    3) Patient placed on  Q15 minute checks and Suicide and Seizure precautions.    4) Dr. Murdock consulted for assistance in management of medical comorbidities.    5) Will order following labs:    --Fastling lipids & glucose to establish baseline for any anti-d2 agent therapy    6) Will restart patient on the following psychiatric home meds:   --Cymbalta, Buspar, Seroquel; klonopin.     7) Will make the following medication changes, and proceed with the following treatment planning:   --Klonopin to decrease to 0.5mg BID as part of taper    8) Will begin discharge planning as appropriate for patient.    9) Psychotherapy provided: as below.     All questions answered for the patient.  Treatment plan and medication risks and benefits discussed with: Patient.  Benefits, risks including BBW re: suicidality, alternatives, and adverse effects discussed with pt, including worsening affective sx, risk of yazmin/hypomania and associated s/sx, need to stop and seek urgent medical care.  TD, EPS, metabolic sequelae with anti d2 agent.  Cognitive dysfunction with BZD.      All questions answered for pt. Patient was able to  arrive at an informed decision.  Informed consent provided to proceed with   trial.      Estimated Length of Stay: 2-3 days  Prognosis: fair      Casimiro Rodríguez II, MD  05/14/20 @ 17:01  Dictated using Dragon.      Psychotherapy Note  --Total Psychotherapy Time: 25 minutes  --Participants: Patient, myself  --Current Clinical Status: improving mood  --Focus of Therapeutic Encounter: insight, coping, schema  --Intervention Type: Supportive, CBT/cognitive, ME/Motivational Enhancement and Psycho-Educational  --Therapy notes: I provided reflective listening, supportive therapy, reflection, and allowed them to express affect in therapy course.  CBT re: schema, distortions.  ME re: sobriety & EtOH use.  Educational as above.  Insight to increase awareness to situation.   --DX: as above  --Plan: Continue to work on developing & strengthening coping skills; correcting maladaptive schema; insight; sobriety  --Post therapy status: improving affect and insight.       Casimiro Rodríguez II, MD  05/14/20 @ 17:01

## 2020-05-14 NOTE — PLAN OF CARE
"  Problem: Overarching Goals (Adult)  Goal: Adheres to Safety Considerations for Self and Others  Intervention: Develop and Maintain Individualized Safety Plan  Flowsheets (Taken 5/14/2020 0744)  Safety Measures: clinical history reviewed; suicide assessment completed  Q4 Suicidal Intent without Specific Plan: no  Previous Attempt to Harm Others: no  Q1 Wished to be Dead (Past Month): no  Q5 Suicide Intent with Specific Plan: no  Q2 Suicidal Thoughts (Past Month): no  Feels Like Hurting Others: no  Q6 Suicide Behavior (Lifetime): yes  Q3 Suicidal Thought Method : no  Level of Risk per Screen: moderate risk !  Within the past 3 Months?: yes  Note:   LCSW met with pt 1:1 and completed psychosocial assessment and BPRS. Pt presented to his room, dressed in hospital scrubs, lying in bed, alert and oriented x3. Mood is fair, somewhat irritable and argumentative, affect is congruent. Pt makes fair eye contact, speech is normal. Pt is familiar with LCSW from numerous previous admissions on this unit. Re: reason for this admission, pt states \"I dont need to be here. I just got upset and messed up. I drank a little and then called everyone in [place] to see who really cared about me.\" Reportedly, pt made reports that he had overtaken his Xanax as well as drinking alcohol. Once arriving to the hospital, pt began saying he \"just said that for attention.\" Pt notes that his ex-wife came to visit him and \"it got him depressed\" so he began drinking. Pt tells me he drank \"about a half of a pint\" before he started calling friends and family \"to see if anyone really cared about me.\" Pt has a long hx of mental health and substance abuse issues. Pt notes that this incident was only the second time he had drank this year, the first being when he had one beer on his birthday in [birth year]. Pt notes that he has been doing well. Pt reports that he has his own apartment and has been living independently. PT notes that he has been able to manage " "all of his own finances and has not had any legal issues recently. Pt notes that he has not needed hospitalization since his last time on [facility]. Pt continues to minimize his need for treatment on [facility] and says \"the doctor told me I could leave tomorrow and I expect that to happen.\" Pt notes that this is \"being more detrimental that it is good for me to be here.\" LCSW voiced concern re: pt's alcohol use, specifically using alcohol as a coping mechanism. LCSW educated pt on risks associated with use and advised him to abstain. LCSW engaged pt in conversation re: how to identify depression warning signs and appropriately cope. Pt was not willing to really engage in therapeutic conversation, as he is focused on wanting to be dc'd. Plan will be to continue to engage pt in individual and group MD jeanine to address med management. Tx team will meet and develop plan. LCSW To follow up accordingly.    Mental Status Exam:    Hygiene:   fair  Cooperation:  Cooperative  Eye Contact:  Fair  Psychomotor Behavior:  Appropriate  Affect:  Appropriate  Speech:  Normal  Thought Progress:  Circum  Thought Content:  Mood congruent  Suicidal:  None  Homicidal:  None  Hallucinations:  None  Delusion:  None  Memory:  Intact  Orientation:  Person, Place and Time  Reliability:  fair  Insight:  Fair  Judgement:  Fair  Impulse Control:  Fair    Goals for treatment: \"I dont need to be here. It was a mistake.\"    Prior Hospitalizations / Dates    [facility] (numerous times)    Childhood History: No significant hx noted    Suicide Attempts: Numerous previous suicide attempts    Alcohol: occasional binge drinking,  Cannabis: does not use, Methamphetamine: does not use and Opiate: does not use    Sexual: heterosexual, Marital Status: , Living situation: alone and Occupation: on permanent disability    History of physical abuse: no, History of sexual abuse: no and History of verbal/emotional abuse: no    high school diploma/GED    Access to firearms: " Denies access    Past Medical History:   Diagnosis Date    Alzheimer disease (CMS/[facility])     Anxiety     Back pain, chronic     Bipolar 1 disorder (CMS/[facility])     Depression     Depression     Diabetes mellitus (CMS/[facility])     GERD (gastroesophageal reflux disease)     Head injury     Hyperlipidemia     Hypertension     Injury of back     Manic depression (CMS/[facility])     Parkinson disease (CMS/[facility])     Psychiatric complaint     Psychosis (CMS/[facility])     Suicide attempt (CMS/[facility])            Goal: Optimized Coping Skills in Response to Life Stressors  Intervention: Promote Effective Coping Strategies  Flowsheets (Taken 5/14/2020 0744)  Supportive Measures: active listening utilized; positive reinforcement provided; verbalization of feelings encouraged; counseling provided; problem solving facilitated; decision-making supported; goal setting facilitated; self-care encouraged; self-reflection promoted; self-responsibility promoted  Goal: Develops/Participates in Therapeutic [place] to Support Successful Transition  Intervention: Foster Therapeutic [place]  Flowsheets (Taken 5/14/2020 0744)  Trust Relationship/Rapport: care explained; thoughts/feelings acknowledged; choices provided; emotional support provided; empathic listening provided; questions answered; questions encouraged; reassurance provided  Intervention: Mutually Develop Transition Plan  Flowsheets (Taken 5/14/2020 0744)  Transition Support: community resources reviewed; crisis management plan promoted; follow-up care discussed     Problem: Suicidal Behavior (Adult)  Intervention: Facilitate Resolution of Suicidal Intent  Flowsheets (Taken 5/14/2020 0744)  Mutually Determined Action Steps (Facilitate Resolution of Suicidal Intent): identifies protective factors; sets future-oriented goal; identifies crisis plan  Intervention: Provide Immediate/Ongoing Protective Physical Environment  Flowsheets (Taken 5/14/2020 0744)  Mutually Determined Action Steps (Provide  Immediate/Ongoing Protective Physical Environment): verbalizes safety check rationale; identifies home safety strategy; shares suicidal thoughts     Problem: Impaired Control (Excessive Substance Use) (Adult)  Intervention: Promote Optimal Psychological Functioning  Flowsheets (Taken 5/14/2020 0744)  Mutually Determined Action Steps (Promote Optimal Psychological Functioning): discusses ongoing recovery plan; identifies support resources  Trust Relationship/Rapport: care explained; thoughts/feelings acknowledged; choices provided; emotional support provided; empathic listening provided; questions answered; questions encouraged; reassurance provided     Problem: Social/Occupational/Functional Impairment (Excessive Substance Use) (Adult)  Intervention: Promote Social, Occupational and Functional Ability  Flowsheets (Taken 5/14/2020 0744)  Mutually Determined Action Steps (Promote Social/Occupational/Functional Ability): identifies personal strengths; identifies support resources  Trust Relationship/Rapport: care explained; thoughts/feelings acknowledged; choices provided; emotional support provided; empathic listening provided; questions answered; questions encouraged; reassurance provided     Problem: Mood Impairment (Depressive Signs/Symptoms) (Adult)  Intervention: Promote Mood Improvement  Flowsheets (Taken 5/14/2020 0744)  Mutually Determined Action Steps (Promote Mood Improvement): acknowledges progress; identifies personal treatment goal; identifies thought distortion; verbalizes increased insight

## 2020-05-14 NOTE — NURSING NOTE
Behavior   Note any precipitants to event or behavior   Describe level and action of any aggressive behavior or speech and associated interventions.     Anxiety: Restless/Edgy  Depression: Patient denies at this time  Pain  0  AVH   no  S/I   no  Plan  no  H/I   no  Plan  no    Affect   flat      Note:Pt is alert, oriented x3 verbal and ambulatory. Pt in a much better mood today. Cooperative and compliant. Appropriate interaction with staff and peers. Took medication as ordered. Denies any needs at this time. Will continue to monitor and provide a safe environment.       Intervention    PRN medication utilized:  no    Instructed in medication usage and effects  Medications administered as ordered  Encouraged to verbalize needs      Response    Verbalized understanding   Did patient take medications as ordered yes   Did patient interact with assessment?  yes     Plan    Will monitor for safety  Will monitor every 15 minutes as ordered  Will evaluate and promote the plan of care    Last BM:  unknown date  (Please chart in I/O as well)

## 2020-05-14 NOTE — PLAN OF CARE
Problem: Patient Care Overview  Goal: Plan of Care Review  Outcome: Ongoing (interventions implemented as appropriate)  Flowsheets (Taken 5/14/2020 0596)  Progress: no change  Plan of Care Reviewed With: patient  Patient Agreement with Plan of Care: agrees with comment (describe) (frustrated and does not feel that he needs to be here, but is compliant though he is angry.)  Outcome Summary: pt has remained asleep throughout night, approximately 6 hours at time of this note.

## 2020-05-14 NOTE — PLAN OF CARE
Problem: Patient Care Overview  Goal: Plan of Care Review  Outcome: Ongoing (interventions implemented as appropriate)  Flowsheets (Taken 5/14/2020 1309)  Progress: no change  Plan of Care Reviewed With: patient  Patient Agreement with Plan of Care: agrees  Outcome Summary: Pt has been compliant and on task. Attending all groups as scheduled.  Goal: Individualization and Mutuality  Outcome: Ongoing (interventions implemented as appropriate)  Goal: Discharge Needs Assessment  Outcome: Ongoing (interventions implemented as appropriate)  Goal: Interprofessional Rounds/Family Conf  Outcome: Ongoing (interventions implemented as appropriate)

## 2020-05-15 PROCEDURE — 99239 HOSP IP/OBS DSCHRG MGMT >30: CPT | Performed by: PSYCHIATRY & NEUROLOGY

## 2020-05-15 RX ORDER — QUETIAPINE FUMARATE 300 MG/1
150 TABLET, FILM COATED ORAL NIGHTLY
Qty: 15 TABLET | Refills: 0
Start: 2020-05-15

## 2020-05-15 RX ORDER — DULOXETIN HYDROCHLORIDE 60 MG/1
60 CAPSULE, DELAYED RELEASE ORAL DAILY
Start: 2020-05-15

## 2020-05-15 RX ORDER — BUSPIRONE HYDROCHLORIDE 10 MG/1
10 TABLET ORAL 2 TIMES DAILY
Start: 2020-05-15

## 2020-05-15 RX ORDER — HYDROCHLOROTHIAZIDE 25 MG/1
25 TABLET ORAL DAILY
Qty: 30 TABLET | Refills: 0
Start: 2020-05-15

## 2020-05-15 RX ORDER — LANOLIN ALCOHOL/MO/W.PET/CERES
3 CREAM (GRAM) TOPICAL NIGHTLY
Qty: 30 TABLET | Refills: 0
Start: 2020-05-15

## 2020-05-15 RX ORDER — CLONAZEPAM 0.5 MG/1
0.5 TABLET ORAL 2 TIMES DAILY
Start: 2020-05-15

## 2020-05-15 RX ADMIN — RIVASTIGMINE TARTRATE 6 MG: 1.5 CAPSULE ORAL at 08:20

## 2020-05-15 RX ADMIN — ATENOLOL 50 MG: 25 TABLET ORAL at 08:21

## 2020-05-15 RX ADMIN — CLONAZEPAM 0.5 MG: 0.5 TABLET ORAL at 08:21

## 2020-05-15 RX ADMIN — HYDROCHLOROTHIAZIDE 25 MG: 12.5 TABLET ORAL at 08:21

## 2020-05-15 RX ADMIN — ASPIRIN 81 MG 81 MG: 81 TABLET ORAL at 08:21

## 2020-05-15 RX ADMIN — PANTOPRAZOLE SODIUM 40 MG: 40 TABLET, DELAYED RELEASE ORAL at 08:21

## 2020-05-15 RX ADMIN — LOSARTAN POTASSIUM 100 MG: 50 TABLET ORAL at 08:21

## 2020-05-15 RX ADMIN — CLOPIDOGREL BISULFATE 75 MG: 75 TABLET ORAL at 08:21

## 2020-05-15 RX ADMIN — BUSPIRONE HYDROCHLORIDE 10 MG: 10 TABLET ORAL at 08:21

## 2020-05-15 RX ADMIN — DULOXETINE HYDROCHLORIDE 40 MG: 20 CAPSULE, DELAYED RELEASE ORAL at 08:21

## 2020-05-15 NOTE — PLAN OF CARE
Problem: Patient Care Overview  Goal: Plan of Care Review  Outcome: Ongoing (interventions implemented as appropriate)  Flowsheets (Taken 5/15/2020 5108)  Progress: improving  Plan of Care Reviewed With: patient  Patient Agreement with Plan of Care: agrees  Outcome Summary: pt has remained asleep throughout night, approximately 7.5 hours of sleep at this time.

## 2020-05-15 NOTE — DISCHARGE SUMMARY
"Admission Date: 5/13/2020    Discharge Date: 5/15/2020      Psychiatric History & Reason for Hospitalization:  Chief Complaint: Suicidal Ideation, Status Post Suicide Attempt and Substance Abuse        History of Present Illness:  Mr. Nick Abreu is a 55 y.o. male with a concurrent neuropsychiatric history notable for EtOH abuse, MDD, Neurocognitive d/o.       Pt transferred from the [facility] service s/p overdose attempt and EtOH intoxification.       Presents with suicide attempt and substance abuse. Onset of symptoms was abrupt starting several days ago.  Symptoms have been present on an intermittent basis. Symptoms are associated with substance use.  Symptoms are aggravated by problems with substance abuse.   Symptoms improve with sobriety.  Patient's symptom severity is moderate.   Patient's symptoms occur in the context of chronic illness.      Pt is more open today.  He is able to more coherently discuss events leading to his stay: interaction with an ex-wife, stress, binge EtOH use.  He is apologetic for his interactions on the hospitalist service.       He reports improving mood and anxiety.  Now denies any SI.  He does report overuse of his medication, states it was \"only a couple\" tabs of medication and his intention was not to kill himself.       We focused on discussing ways to cope.  Focusing on social support, as well.          Psychiatric Review Of Systems:  --Depression: +low mood.    --Anxiety: denies any excessive  --Psychosis: denies AVH or paranoia  --Lo: no hx consistent with lo or hypomanis        Concurrent Psychiatric History:  Past psychiatric history: Patient has been hospitalized numerous times in the past but states that he has not been in the hospital since here last March.  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     Suicide " "Attempts: Patient has had 3  prior suicide attempts.     Prior Treatment and Medications Tried: Xanax, Klonopin, Restoril, Celexa, Aricept, Zyprexa, Risperdal, Seroquel, Depakote, and Prozac      History of violence or legal issues: He has a history of DUI and assault charges, also Meth related charge in 2018       -firearms: denies access        Substance Use:   --Nicotine: former user   --Caffeine: none daily   --EtOH: binge use, as above; no daily use or escalating use   --THC: remote hx   --Illicits: denies; has script for BZD        Social History:  --> ; on disability; denies financial stressors; lives in an apartment by himself  Social History   Social History            Socioeconomic History   • Marital status:        Spouse name: Not on file   • Number of children: Not on file   • Years of education: Not on file   • Highest education level: Not on file   Tobacco Use   • Smoking status: Former Smoker       Packs/day: 0.25       Years: 20.00       Pack years: 5.00       Last attempt to quit: 12/15/2019       Years since quittin.4   • Smokeless tobacco: Never Used   Substance and Sexual Activity   • Alcohol use: Yes   • Drug use: No       Comment: patient had RX for xanax   • Sexual activity: Defer   Social History Narrative     Substance Abuse: Alcohol: does not drink,  Cannabis: \"40yrs ago\", Methamphetamine: does not use, Opiate: does not use, Cocaine: does not use, Synthetic: does not use and IV drug use: denies; he has been on high dose benzodiazepine from prescription for years.  These were tapered off at the [facility] last month.  He restarted using xanax once he left the [facility] and went back to see his doctor.     2018: States he began smoking around 1 year ago to stay awake while driving. Only smokes a few cigarettes per day. Often goes several days without smoking.            Marriages: 7     Current Relationships:  but living separately for about 3 yrs     Children: 3       "     Education: some college      Occupation: on disability; he was a  for 27yrs. 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     Living Situation: he left the [place] about a week ago and has been staying at a motel.  He plans to get back with his estranged wife and move in with her.               Family History:        Family History   Problem Relation Age of Onset   • Bipolar disorder Mother     • Dementia Father        -->Further details: Family Suicides: denies        Past Medical and Surgical History:  Medical History        Past Medical History:   Diagnosis Date   • Alzheimer disease (CMS/HCC)     • Anxiety     • Back pain, chronic     • Bipolar 1 disorder (CMS/HCC)     • Depression     • Depression     • Diabetes mellitus (CMS/HCC)     • GERD (gastroesophageal reflux disease)     • Head injury     • Hyperlipidemia     • Hypertension     • Injury of back     • Manic depression (CMS/HCC)     • Parkinson disease (CMS/HCC)     • Psychiatric complaint     • Psychosis (CMS/HCC)     • Suicide attempt (CMS/HCC)           --> Seizure Hx: denies  --> Head Injury w/ LOC: hx of such; was boxer        Surgical History         Past Surgical History:   Procedure Laterality Date   • BACK SURGERY       • CHOLECYSTECTOMY       • 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     Prescriptions Prior to Admission           Medications Prior to Admission   Medication Sig Dispense Refill Last Dose   • ALPRAZolam (XANAX) 2 MG tablet Take 2 mg by mouth 3 (Three) Times a Day As Needed for Anxiety.         • aspirin 81 MG chewable tablet Chew 1 tablet Daily. 30 tablet 0 Past Month at Unknown time   • atenolol (TENORMIN) 50 MG tablet Take 50 mg by mouth Daily.     Past Week at Unknown time   • atorvastatin (LIPITOR) 20 MG tablet Take 1 tablet by mouth Every Night. 30 tablet 0 Past Week at Unknown time   • donepezil (ARICEPT) 10 MG tablet Take 1 tablet by mouth Every  "Night. 30 tablet 0 Past Week at Unknown time   • hydrOXYzine pamoate (Vistaril) 25 MG capsule Take 25 mg by mouth 3 (Three) Times a Day As Needed for Itching or Allergies (pt states skin rash \" break outs \" when off this medication).         • losartan (COZAAR) 50 MG tablet Take 100 mg by mouth Daily.     Past Week at Unknown time   • omeprazole (priLOSEC) 40 MG capsule Take 40 mg by mouth Daily.     Past Week at Unknown time   • primidone (MYSOLINE) 50 MG tablet Take 50 mg by mouth Every Night.     Past Week at Unknown time   • rivastigmine (EXELON) 6 MG capsule Take 6 mg by mouth 2 (Two) Times a Day.         • topiramate (TOPAMAX) 50 MG tablet Take 50 mg by mouth 2 (Two) Times a Day.     Past Week at Unknown time   • zolpidem (AMBIEN) 10 MG tablet Take 10 mg by mouth At Night As Needed for Sleep.         • clopidogrel (PLAVIX) 75 MG tablet Take 1 tablet by mouth Daily. 30 tablet 0     • doxazosin (CARDURA) 2 MG tablet Take 2 mg by mouth Every Night.               --> Reviewed;            Diagnostic Data:    --Labs:   Recent Results (from the past 168 hour(s))   Comprehensive Metabolic Panel    Collection Time: 05/11/20  3:16 AM   Result Value Ref Range    Glucose 114 (H) 65 - 99 mg/dL    BUN 9 6 - 20 mg/dL    Creatinine 1.04 0.76 - 1.27 mg/dL    Sodium 146 (H) 136 - 145 mmol/L    Potassium 3.7 3.5 - 5.2 mmol/L    Chloride 109 (H) 98 - 107 mmol/L    CO2 24.0 22.0 - 29.0 mmol/L    Calcium 10.4 8.6 - 10.5 mg/dL    Total Protein 7.7 6.0 - 8.5 g/dL    Albumin 4.50 3.50 - 5.20 g/dL    ALT (SGPT) 47 (H) 1 - 41 U/L    AST (SGOT) 36 1 - 40 U/L    Alkaline Phosphatase 129 (H) 39 - 117 U/L    Total Bilirubin 0.3 0.2 - 1.2 mg/dL    eGFR Non African Amer 74 >60 mL/min/1.73    Globulin 3.2 gm/dL    A/G Ratio 1.4 g/dL    BUN/Creatinine Ratio 8.7 7.0 - 25.0    Anion Gap 13.0 5.0 - 15.0 mmol/L   Lipase    Collection Time: 05/11/20  3:16 AM   Result Value Ref Range    Lipase 63 (H) 13 - 60 U/L   Ethanol    Collection Time: 05/11/20 "  3:16 AM   Result Value Ref Range    Ethanol 255 (H) 0 - 10 mg/dL    Ethanol % 0.255 %   Acetaminophen Level    Collection Time: 05/11/20  3:16 AM   Result Value Ref Range    Acetaminophen <5.0 (L) 10.0 - 30.0 mcg/mL   Salicylate Level    Collection Time: 05/11/20  3:16 AM   Result Value Ref Range    Salicylate <0.3 <=30.0 mg/dL   CBC Auto Differential    Collection Time: 05/11/20  3:16 AM   Result Value Ref Range    WBC 10.08 3.40 - 10.80 10*3/mm3    RBC 5.81 (H) 4.14 - 5.80 10*6/mm3    Hemoglobin 18.4 (H) 13.0 - 17.7 g/dL    Hematocrit 52.6 (H) 37.5 - 51.0 %    MCV 90.5 79.0 - 97.0 fL    MCH 31.7 26.6 - 33.0 pg    MCHC 35.0 31.5 - 35.7 g/dL    RDW 11.9 (L) 12.3 - 15.4 %    RDW-SD 39.0 37.0 - 54.0 fl    MPV 10.3 6.0 - 12.0 fL    Platelets 187 140 - 450 10*3/mm3    Neutrophil % 78.3 (H) 42.7 - 76.0 %    Lymphocyte % 17.1 (L) 19.6 - 45.3 %    Monocyte % 3.4 (L) 5.0 - 12.0 %    Eosinophil % 0.4 0.3 - 6.2 %    Basophil % 0.2 0.0 - 1.5 %    Immature Grans % 0.6 (H) 0.0 - 0.5 %    Neutrophils, Absolute 7.90 (H) 1.70 - 7.00 10*3/mm3    Lymphocytes, Absolute 1.72 0.70 - 3.10 10*3/mm3    Monocytes, Absolute 0.34 0.10 - 0.90 10*3/mm3    Eosinophils, Absolute 0.04 0.00 - 0.40 10*3/mm3    Basophils, Absolute 0.02 0.00 - 0.20 10*3/mm3    Immature Grans, Absolute 0.06 (H) 0.00 - 0.05 10*3/mm3    nRBC 0.0 0.0 - 0.2 /100 WBC   Hemoglobin A1c    Collection Time: 05/11/20  3:16 AM   Result Value Ref Range    Hemoglobin A1C 5.30 4.80 - 5.60 %   Light Blue Top    Collection Time: 05/11/20  3:20 AM   Result Value Ref Range    Extra Tube hold for add-on    Gold Top - SST    Collection Time: 05/11/20  3:20 AM   Result Value Ref Range    Extra Tube Hold for add-ons.    Ethanol    Collection Time: 05/11/20  9:58 AM   Result Value Ref Range    Ethanol 131 (H) 0 - 10 mg/dL    Ethanol % 0.131 %   POC Glucose Once    Collection Time: 05/11/20 10:54 AM   Result Value Ref Range    Glucose 136 (H) 70 - 130 mg/dL   Urinalysis With Microscopic If  Indicated (No Culture) - Urine, Clean Catch    Collection Time: 05/11/20 12:18 PM   Result Value Ref Range    Color, UA Yellow Yellow, Straw, Dark Yellow, Mattie    Appearance, UA Clear Clear    pH, UA <=5.0 5.0 - 9.0    Specific Gravity, UA 1.016 1.003 - 1.030    Glucose, UA Negative Negative    Ketones, UA Trace (A) Negative    Bilirubin, UA Negative Negative    Blood, UA Negative Negative    Protein, UA Negative Negative    Leuk Esterase, UA Negative Negative    Nitrite, UA Negative Negative    Urobilinogen, UA 0.2 E.U./dL 0.2 - 1.0 E.U./dL   Urine Drug Screen - Urine, Clean Catch    Collection Time: 05/11/20 12:18 PM   Result Value Ref Range    THC, Screen, Urine Negative Negative    Phencyclidine (PCP), Urine Negative Negative    Cocaine Screen, Urine Negative Negative    Methamphetamine, Ur Negative Negative    Opiate Screen Negative Negative    Amphetamine Screen, Urine Negative Negative    Benzodiazepine Screen, Urine Positive (A) Negative    Tricyclic Antidepressants Screen Negative Negative    Methadone Screen, Urine Negative Negative    Barbiturates Screen, Urine Negative Negative    Oxycodone Screen, Urine Negative Negative    Propoxyphene Screen Negative Negative    Buprenorphine, Screen, Urine Negative Negative   Ethanol    Collection Time: 05/11/20  1:58 PM   Result Value Ref Range    Ethanol 36 (H) 0 - 10 mg/dL    Ethanol % 0.036 %   POC Glucose Once    Collection Time: 05/11/20  4:23 PM   Result Value Ref Range    Glucose 168 (H) 70 - 130 mg/dL   Ethanol    Collection Time: 05/12/20  8:03 AM   Result Value Ref Range    Ethanol <10 0 - 10 mg/dL    Ethanol % <0.010 %   POC Glucose Once    Collection Time: 05/12/20  8:58 AM   Result Value Ref Range    Glucose 129 70 - 130 mg/dL   POC Glucose Once    Collection Time: 05/12/20 10:32 AM   Result Value Ref Range    Glucose 177 (H) 70 - 130 mg/dL   POC Glucose Once    Collection Time: 05/12/20  4:19 PM   Result Value Ref Range    Glucose 129 70 - 130 mg/dL    POC Glucose Once    Collection Time: 05/12/20  7:34 PM   Result Value Ref Range    Glucose 121 70 - 130 mg/dL   POC Glucose Once    Collection Time: 05/13/20  6:19 AM   Result Value Ref Range    Glucose 126 70 - 130 mg/dL   POC Glucose Once    Collection Time: 05/13/20 10:17 AM   Result Value Ref Range    Glucose 128 70 - 130 mg/dL       Lab Results   Component Value Date    GACL28NF 17.8 (L) 04/01/2019    GGBXXMSS65 398 04/01/2019    FOLATE 6.45 04/01/2019       Lab Results   Component Value Date    GLUF 97 03/31/2019        Lab Results   Component Value Date    CHOL 100 03/31/2019    TRIG 109 03/31/2019    HDL 28 (L) 03/31/2019    LDL 50 03/31/2019    VLDL 21.8 03/31/2019    LDLHDL 1.79 03/31/2019        TSH   Date Value Ref Range Status   03/30/2019 1.130 0.270 - 4.200 mIU/mL Final         --Imaging:  Xr Shoulder 2+ View Right    Result Date: 5/11/2020  Narrative: Right shoulder three view on 5/11/2020 CLINICAL INDICATION: Pain after fall COMPARISON: 8/23/2016 FINDINGS: There is evidence of calcified granulomatous disease in the right chest. The AC joint is well aligned. There is an old healed right distal clavicle diaphysis fracture. The glenohumeral joint is well located. There are no acute fractures.     Impression: No acute abnormality. Electronically signed by:  Harsha Armstrong  5/11/2020 4:10 AM i-markerT Workstation: [id number]    Xr Knee 4+ View Right    Result Date: 5/11/2020  Narrative: Right knee four view on 5/11/2020 CLINICAL INDICATION: Pain after fall COMPARISON: None FINDINGS: There is calcification of the menisci consistent with chondrocalcinosis and CPPD. There is intramedullary alfonso traversing an old healed proximal tibia diaphysis fracture. No joint effusion is noted. There are no acute fractures. Visualized joints are well aligned.     Impression: Chronic findings as above with no acute abnormality. Electronically signed by:  Harsha Armstrong  5/11/2020 4:08 AM i-markerT Workstation: [id number]    Ct Head  Without Contrast    Result Date: 5/11/2020  Narrative: CT head without contrast on 5/11/2020 CLINICAL INDICATION: Head injury, per protocol for mechanism of injury TECHNIQUE: Multiple axial images are obtained throughout the head without the administration of contrast. This exam was performed according to our departmental dose-optimization program, which includes automated exposure control, adjustment of the mA and/or kV according to patient size and/or use of iterative reconstruction technique. Total DLP is 1063.8 mGy*cm. COMPARISON: 7/27/2019 FINDINGS: There is minimal low-density in the periventricular white matter consistent with minimal chronic small vessel ischemic changes. There is no hydrocephalus. There is no CT evidence of acute infarct. There is no hemorrhage. There are no abnormal extra-axial fluid collections. There is no mass, mass effect or midline shift. No bony abnormality is noted.     Impression: No acute intracranial abnormality. Electronically signed by:  Harsha Armstrong  5/11/2020 3:53 AM CDT Workstation: [id number]        Summary of Hospital Course:     Patient was admitted to the behavioral health unit at [facility] to ensure patient safety.  Patient was provided treatment with the unit milieu, activities, therapies and psychopharmacological management.  Patient was placed on Q15 minute checks and Suicide.     Dr. Murdock was consulted for management of medical co-morbidities.      Patient was restarted on the following psychiatric medications:   --Cymbalta, Buspar, Seroquel; klonopin.      The following medication changes were made during the hospital stay:   --Klonopin to decrease to 0.5mg BID as part of taper      Patient had improvement over the course of the hospital stay and tolerated medications well.  Improvement in his mood and behaviors occurred and these gains were maintained during stay.   No behavioral issues.       Patient declined family session, despite attempts  to encourage him otherwise.      Substance abuse issues were present.  +EtOH use, binge.  He declined any need for rehab or referral.  He is agreeable to outpt BH f/u.      He was focused on d/c from time of admission.  On HD#2, he requested d/c  At time of interview, patient adamantly denies any thoughts of death or dying.  The patient also adamantly denies any suicidal ideations, intentions or plans.  Denies any homicidal ideations, intentions or plans.  Denies any auditory visual hallucinations.  Denies paranoia.  Not grossly psychotic.  The patient does not constitute an imminent risk of harm to self or others, at time of the interview.  Therefore, patient does not meet commitment criteria at this time.  Given chronic nature of his psychiatric illness, planned to work with him to maintain rapport, and will proceed with d/c.        Patients Condition at Discharge:  Patient is stable for discharge and is not an imminent threat to self or others.         Discharging Exam:    Targeted PE:   --MS:  No tremors or abnormal involuntary movements and No Cog Pipestem or Rigidity and No atrophy noted  --Gait & Station:  Blank multiple: Normal  --HEENT: No Nystagmus or Opthalmoplegia.     --Pulm: unlaboured    MSE:  --Cooperation:  Cooperative  --Eye Contact:  Good  --Psychomotor Behavior:  Appropriate  --Mood:  Improving  --Affect:  mood-congruent and No overt dysphoria noted  --Speech:  Normal r/r/v, Not Pressured and Not Rapid  --Thought Process:  Coherent, Linear, Logical and No Flight of Ideas  --Associations: Goal Directed and No Looseness of Associations  --Themes:  Hope for Future and Self Improvement  --Thought Content:     --Mood congruent   --Suicidal:  Denies    --Homicidal:  Denies   --Hallucinations:  Denies and Not appearing to respond to internal stimuli   --Delusion:  None noted/overt and No overt psychotic overlay  --Cognitive Functioning:   -Consciousness: awake and alert   -Orientation:  Person, Place,  Time and Situation   -Attention:  Adequate    -Concentration:  Adequate and Improving   -Fund of Knowledge:  Average to Below Average based on interaction  --Reliability:  adequate  --Insight:  Improving  --Judgment:  Improving and Without Gross Impairment  --Impulse Control:  Improving and Without Gross Impairment      AIMS: 0      Discharging Diagnoses:    Unspecified dementia without behavioral disturbance (CMS/HCC)    Severe episode of recurrent major depressive disorder, with psychotic features (CMS/HCC)    Overdose    Suicide attempt (CMS/HCC)        Discharge Medications:      Your medication list      CHANGE how you take these medications      Instructions Last Dose Given Next Dose Due   clonazePAM 0.5 MG tablet  Commonly known as:  KlonoPIN  What changed:    · medication strength  · how much to take      Take 1 tablet by mouth 2 (Two) Times a Day. Indications: Panic Disorder          CONTINUE taking these medications      Instructions Last Dose Given Next Dose Due   aspirin 81 MG chewable tablet      Chew 1 tablet Daily.       atenolol 50 MG tablet  Commonly known as:  TENORMIN      Take 50 mg by mouth Daily.       atorvastatin 20 MG tablet  Commonly known as:  LIPITOR      Take 1 tablet by mouth Every Night.       busPIRone 10 MG tablet  Commonly known as:  BUSPAR      Take 1 tablet by mouth 2 (Two) Times a Day. Indications: Anxiety Disorder       clopidogrel 75 MG tablet  Commonly known as:  PLAVIX      Take 1 tablet by mouth Daily.       doxazosin 2 MG tablet  Commonly known as:  CARDURA      Take 2 mg by mouth Every Night.       DULoxetine 60 MG capsule  Commonly known as:  CYMBALTA      Take 1 capsule by mouth Daily. Indications: Major Depressive Disorder       hydroCHLOROthiazide 25 MG tablet  Commonly known as:  HYDRODIURIL      Take 1 tablet by mouth Daily. Indications: High Blood Pressure Disorder       losartan 50 MG tablet  Commonly known as:  COZAAR      Take 100 mg by mouth Daily.      "  melatonin 3 MG tablet      Take 1 tablet by mouth Every Night. Indications: Trouble Sleeping       omeprazole 40 MG capsule  Commonly known as:  priLOSEC      Take 40 mg by mouth Daily.       primidone 50 MG tablet  Commonly known as:  MYSOLINE      Take 50 mg by mouth Every Night.       QUEtiapine 300 MG tablet  Commonly known as:  SEROquel      Take 0.5 tablets by mouth Every Night. Indications: Depressive Phase of Manic-Depression       rivastigmine 6 MG capsule  Commonly known as:  EXELON      Take 6 mg by mouth 2 (Two) Times a Day.       topiramate 50 MG tablet  Commonly known as:  TOPAMAX      Take 50 mg by mouth 2 (Two) Times a Day.       Vistaril 25 MG capsule  Generic drug:  hydrOXYzine pamoate      Take 25 mg by mouth 3 (Three) Times a Day As Needed for Itching or Allergies (pt states skin rash \" break outs \" when off this medication).          STOP taking these medications    ALPRAZolam 2 MG tablet  Commonly known as:  XANAX        donepezil 10 MG tablet  Commonly known as:  ARICEPT        zolpidem 10 MG tablet  Commonly known as:  AMBIEN              Where to Get Your Medications      Information about where to get these medications is not yet available    Ask your nurse or doctor about these medications  · busPIRone 10 MG tablet  · clonazePAM 0.5 MG tablet  · DULoxetine 60 MG capsule  · hydroCHLOROthiazide 25 MG tablet  · melatonin 3 MG tablet  · QUEtiapine 300 MG tablet     --No scripts provided, given continuation of home medications.      Justification for multiple antipsychotic medications at discharge:    --Not Applicable.  Medication for smoking cessation:   --Patient does not smoke and medication is not indicated.  Medication for substance abuse:   --Patient does not have a substance use diagnosis and medication is not indicated.    Discharge Diet:   As per pre-admission.  Activity Level:   As per pre-admission.    Disposition: Patient was discharged home with self.    Outpatient " Follow-Up:  Follow-up Information     Kati Garcia MD .    Specialties:  Family Medicine, Emergency Medicine  Contact information:  [address] [place] [address] [address] [place] [address] [phone]             [facility] - [facility]. Go on 5/20/2020.    Why:  Arrive at 10 am for Open Access appt    Take ID, Ins Card, SS Card, and Med Bottles to follow up appt    Call Crisis Hotline as needed [phone]  Contact information:  [address] [place] [address] [address] [place] [address] [phone]                 --Psychiatric & Behavioral Health follow up will be with [facility].    --Non-Psychiatric Medical follow up will be with primary care physician.      Time Spent: More than 30 minutes.    Casimiro Rodríguez II, MD  05/15/20  08:10

## 2020-05-15 NOTE — DISCHARGE INSTRUCTIONS
--Continue medications as currently prescribed.  --Continue follow-up with outpatient providers.  --Please contact our Behavioral Health Unit with any questions or concerns at [phone].  --Return to the ER with any suicidal or homicidal ideation, or worsening symptoms.    --The number for the National Suicide & Crisis Hotline is 1-635.441.1500.  The National Crisis Text number is 796014.  These may be contacted at any time, if needed.

## 2020-05-15 NOTE — NURSING NOTE
"Behavior   Note any precipitants to event or behavior   Describe level and action of any aggressive behavior or speech and associated interventions.     Anxiety: Patient denies at this time  Depression: Patient denies at this time  Pain  0  AVH   no  S/I   no  Plan  no  H/I   no  Plan  no    Affect   euthymic/normal      Note:pt seen in personal room, calm and cooperative, refused seroquel stating,\"i cant take it, it does something to me but I cant remember what.  Suze tried it some years ago, it didn't go well.\"  Pt took all other medications with ease.  Pt remained in personal room to rest. Pt denies all issues.      Intervention    PRN medication utilized:  yes - vistaril for itch    Instructed in medication usage and effects  Medications administered as ordered  Encouraged to verbalize needs      Response    Verbalized understanding   Did patient take medications as ordered no, refused seroquel  Did patient interact with assessment?  yes     Plan    Will monitor for safety  Will monitor every 15 minutes as ordered  Will evaluate and promote the plan of care    Last BM:  unknown date  (Please chart in I/O as well)  "

## 2020-05-15 NOTE — NURSING NOTE
Patient discharged per MD order. Patient alert and ambulatory on discharge. Safety plan reviewed and copy given to patient. Patient verbalized understanding of D/C instructions.

## 2020-05-15 NOTE — PROGRESS NOTES
Pt presented to the dayroom, casually dressed, alert and oriented x3. Mood is fair affect is bright. PT denies SI/HI, AVH.  Plan is for pt to follow up outpt at [facility] and with PCP. Pt was educated on Crisis Hotline, advised to call as needed. PT appears to be at baseline and stable for dc.There is no apparent imminent risk to self or others and this time. BPRS completed upon dc

## 2020-05-19 NOTE — DISCHARGE SUMMARY
FAMILY MEDICINE SERVICE   HOSPITAL DISCHARGE SUMMARY    PATIENT NAME: Nick Abreu   PHYSICIAN: Johan Chappell Jr, MD   : 1965  MRN: [id number]    ADMITTED: 2020  DISCHARGED: 2020     ADMISSION DIAGNOSES:  Active Hospital Problems    Diagnosis  POA   • **Suicidal ideation [R45.851]  Not Applicable   • Diabetes mellitus (CMS/HCC) [E11.9]  Yes   • Class 1 obesity due to excess calories with serious comorbidity and body mass index (BMI) of 34.0 to 34.9 in adult [E66.09, Z68.34]  Not Applicable   • Anxiety disorder [F41.9]  Yes   • Major neurocognitive disorder (CMS/HCC) [F01.50]  Yes   • Severe episode of recurrent major depressive disorder, with psychotic features (CMS/HCC) [F33.3]  Yes      Resolved Hospital Problems    Diagnosis Date Resolved POA   • Alcoholic intoxication with complication (CMS/HCC) [F10.929] 2020 Yes     DISCHARGE DIAGNOSES:   Active Hospital Problems    Diagnosis  POA   • **Suicidal ideation [R45.851]  Not Applicable   • Diabetes mellitus (CMS/HCC) [E11.9]  Yes   • Class 1 obesity due to excess calories with serious comorbidity and body mass index (BMI) of 34.0 to 34.9 in adult [E66.09, Z68.34]  Not Applicable   • Anxiety disorder [F41.9]  Yes   • Major neurocognitive disorder (CMS/HCC) [F01.50]  Yes   • Severe episode of recurrent major depressive disorder, with psychotic features (CMS/HCC) [F33.3]  Yes      Resolved Hospital Problems    Diagnosis Date Resolved POA   • Alcoholic intoxication with complication (CMS/HCC) [F10.929] 2020 Yes       SERVICE: Family Medicine. Attending Javier Hayward M.D., Resident Johan Chappell Jr, MD    CONSULTS:   Consult Orders (all) (From admission, onward)     Start     Ordered    20 1403  Inpatient Case Management  Consult  Once     Provider:  (Not yet assigned)    20 1402    20 1353  Inpatient Psychiatrist Consult  Once     Specialty:  Psychiatry  Provider:  Gerry Triindad MD     05/11/20 1353                PROCEDURES: None    HISTORY OF PRESENT ILLNESS (from admission H&P):   Nick Abreu is a 55 y.o. male with a CMH of major neurocognitive disorder, MDD, anxiety, hx of anxiolytic abuse, who presents complaining of suicide attempt.     Patient is acutely intoxicated and a poor historian. Collateral history obtained from ED physician Dr. Granados. Patient had been drinking reportedly 2 bottles of alcohol and taking an unknown amount of ambien. He then called the suicide hotline and his conversation was concerning enough that they had EMS bring him to ED. He also fell on his face and has some crusted blood around nose with CT head not revealing any acute concern.     Denies pain.   No other complaints.     DIAGNOSTIC DATA:   Lab Results   Component Value Date    WBC 10.08 05/11/2020    HGB 18.4 (H) 05/11/2020    HCT 52.6 (H) 05/11/2020    MCV 90.5 05/11/2020     05/11/2020     Lab Results   Component Value Date    GLUCOSE 114 (H) 05/11/2020    BUN 9 05/11/2020    CREATININE 1.04 05/11/2020    EGFRIFNONA 74 05/11/2020    EGFRIFAFRI 106 05/16/2019    BCR 8.7 05/11/2020    K 3.7 05/11/2020    CO2 24.0 05/11/2020    CALCIUM 10.4 05/11/2020    ALBUMIN 4.50 05/11/2020    AST 36 05/11/2020    ALT 47 (H) 05/11/2020           HOSPITAL COURSE:  Patient was brought to the [facility] ED after he got intoxicated with alcohol and called the suicide hotline stating that he wanted hurt himself.  Police were called and brought patient in.  He was admitted and brought to the floor.  Given his alcohol intoxication patient was placed on CIWA protocol.  He did well overnight.  Psych saw the patient on HD#2 and discussed going to the psych unit.  Patient was resistant, but when he was presented with other options including [place] he was amenable to going to the psych floor.  Per psychiatry patient was kept on his anti depression medications.  He is now medically stable for discharge to psych.          DISCHARGE CONDITION: Medically Stable for discharge to  psychiatry floor    DISPOSITION:  Psychiatric Hospital or Unit ([facility]) [102]     DISCHARGE MEDICATIONS     Discharge Medications      Continue These Medications      Instructions Start Date   aspirin 81 MG chewable tablet   81 mg, Oral, Daily      atenolol 50 MG tablet  Commonly known as:  TENORMIN   50 mg, Oral, Daily      atorvastatin 20 MG tablet  Commonly known as:  LIPITOR   20 mg, Oral, Nightly      clopidogrel 75 MG tablet  Commonly known as:  PLAVIX   75 mg, Oral, Daily      doxazosin 2 MG tablet  Commonly known as:  CARDURA   2 mg, Oral, Nightly      losartan 50 MG tablet  Commonly known as:  COZAAR   100 mg, Oral, Daily      omeprazole 40 MG capsule  Commonly known as:  priLOSEC   40 mg, Oral, Daily      primidone 50 MG tablet  Commonly known as:  MYSOLINE   50 mg, Oral, Nightly      topiramate 50 MG tablet  Commonly known as:  TOPAMAX   50 mg, Oral, 2 Times Daily             INSTRUCTIONS:  Activity:   Activity Instructions     Activity as Tolerated          Diet:   Diet Instructions     Diet: Regular      Discharge Diet:  Regular        Special instructions: Patient instructed to call MD or return to ED with worsening shortness of breath, chest pain, fever greater than 100.4 degrees F or any other medical concerns..    FOLLOW UP:   Additional Instructions for the Follow-ups that You Need to Schedule     Discharge Follow-up with PCP   As directed       Currently Documented PCP:    Kati Garcia MD    PCP Phone Number:    [phone]     Follow Up Details:  Follow-up with PCP in 1 week            Contact information for follow-up providers     Kati Garcia MD .    Specialties:  Family Medicine, Emergency Medicine  Why:  Follow-up with PCP in 1 week  Contact information:  [address] [place] [address] [address] [place] [address]  [phone]                   Contact information for after-discharge care     Destination     [facility]  [place] [facility] .    Service:  Mental Health Hospital Treatment  Contact information:  [address] [place] [id number] [phone]                             PENDING TEST RESULTS AT DISCHARGE      Time: Discharge 40 min    Javier Hayward M.D. is the attending at time of discharge, is aware of the patient's status and agrees with the above discharge summary.      Johan Chappell Jr., M.D.  PGY3  Family Medicine Resident  [address] [place] [address]  Phone: [phone]  Fax: [phone]      This document has been electronically signed by Johan Chappell Jr, MD on 5/13/2020   10:17 AM

## 2020-08-14 ENCOUNTER — LAB (OUTPATIENT)
Dept: LAB | Facility: HOSPITAL | Age: 55
End: 2020-08-14

## 2020-08-14 ENCOUNTER — TRANSCRIBE ORDERS (OUTPATIENT)
Dept: LAB | Facility: HOSPITAL | Age: 55
End: 2020-08-14

## 2020-08-14 DIAGNOSIS — I50.9 CONGESTIVE HEART FAILURE, UNSPECIFIED HF CHRONICITY, UNSPECIFIED HEART FAILURE TYPE (HCC): ICD-10-CM

## 2020-08-14 DIAGNOSIS — I50.9 CONGESTIVE HEART FAILURE, UNSPECIFIED HF CHRONICITY, UNSPECIFIED HEART FAILURE TYPE (HCC): Primary | ICD-10-CM

## 2020-08-14 LAB
BASOPHILS # BLD AUTO: 0.03 10*3/MM3 (ref 0–0.2)
BASOPHILS NFR BLD AUTO: 0.4 % (ref 0–1.5)
DEPRECATED RDW RBC AUTO: 40.6 FL (ref 37–54)
EOSINOPHIL # BLD AUTO: 0.06 10*3/MM3 (ref 0–0.4)
EOSINOPHIL NFR BLD AUTO: 0.8 % (ref 0.3–6.2)
ERYTHROCYTE [DISTWIDTH] IN BLOOD BY AUTOMATED COUNT: 12.3 % (ref 12.3–15.4)
HCT VFR BLD AUTO: 51 % (ref 37.5–51)
HGB BLD-MCNC: 17.9 G/DL (ref 13–17.7)
IMM GRANULOCYTES # BLD AUTO: 0.03 10*3/MM3 (ref 0–0.05)
IMM GRANULOCYTES NFR BLD AUTO: 0.4 % (ref 0–0.5)
LYMPHOCYTES # BLD AUTO: 2.02 10*3/MM3 (ref 0.7–3.1)
LYMPHOCYTES NFR BLD AUTO: 25.6 % (ref 19.6–45.3)
MCH RBC QN AUTO: 32 PG (ref 26.6–33)
MCHC RBC AUTO-ENTMCNC: 35.1 G/DL (ref 31.5–35.7)
MCV RBC AUTO: 91.1 FL (ref 79–97)
MONOCYTES # BLD AUTO: 0.43 10*3/MM3 (ref 0.1–0.9)
MONOCYTES NFR BLD AUTO: 5.4 % (ref 5–12)
NEUTROPHILS NFR BLD AUTO: 5.32 10*3/MM3 (ref 1.7–7)
NEUTROPHILS NFR BLD AUTO: 67.4 % (ref 42.7–76)
NRBC BLD AUTO-RTO: 0 /100 WBC (ref 0–0.2)
PLATELET # BLD AUTO: 207 10*3/MM3 (ref 140–450)
PMV BLD AUTO: 11.9 FL (ref 6–12)
RBC # BLD AUTO: 5.6 10*6/MM3 (ref 4.14–5.8)
WBC # BLD AUTO: 7.89 10*3/MM3 (ref 3.4–10.8)

## 2020-08-14 PROCEDURE — 80053 COMPREHEN METABOLIC PANEL: CPT | Performed by: NURSE PRACTITIONER

## 2020-08-14 PROCEDURE — 85027 COMPLETE CBC AUTOMATED: CPT | Performed by: NURSE PRACTITIONER

## 2020-08-17 LAB
ALBUMIN SERPL-MCNC: 4.8 G/DL (ref 3.5–5.2)
ALBUMIN/GLOB SERPL: 1.8 G/DL
ALP SERPL-CCNC: 117 U/L (ref 39–117)
ALT SERPL W P-5'-P-CCNC: 54 U/L (ref 1–41)
ANION GAP SERPL CALCULATED.3IONS-SCNC: 18 MMOL/L (ref 5–15)
AST SERPL-CCNC: 37 U/L (ref 1–40)
BILIRUB SERPL-MCNC: 1 MG/DL (ref 0–1.2)
BUN SERPL-MCNC: 6 MG/DL (ref 6–20)
BUN/CREAT SERPL: 5 (ref 7–25)
CALCIUM SPEC-SCNC: 11.3 MG/DL (ref 8.6–10.5)
CHLORIDE SERPL-SCNC: 101 MMOL/L (ref 98–107)
CO2 SERPL-SCNC: 18 MMOL/L (ref 22–29)
CREAT SERPL-MCNC: 1.2 MG/DL (ref 0.76–1.27)
GFR SERPL CREATININE-BSD FRML MDRD: 63 ML/MIN/1.73
GLOBULIN UR ELPH-MCNC: 2.6 GM/DL
GLUCOSE SERPL-MCNC: 106 MG/DL (ref 65–99)
POTASSIUM SERPL-SCNC: 4.4 MMOL/L (ref 3.5–5.2)
PROT SERPL-MCNC: 7.4 G/DL (ref 6–8.5)
SODIUM SERPL-SCNC: 137 MMOL/L (ref 136–145)

## 2020-08-21 ENCOUNTER — LAB (OUTPATIENT)
Dept: LAB | Facility: HOSPITAL | Age: 55
End: 2020-08-21

## 2020-08-21 ENCOUNTER — TRANSCRIBE ORDERS (OUTPATIENT)
Dept: LAB | Facility: HOSPITAL | Age: 55
End: 2020-08-21

## 2020-08-21 DIAGNOSIS — R53.1 WEAKNESS: ICD-10-CM
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
## 2020-08-21 DIAGNOSIS — I15.9 SECONDARY HYPERTENSION: ICD-10-CM

## 2020-08-21 DIAGNOSIS — R53.1 WEAKNESS: Primary | ICD-10-CM

## 2020-08-21 LAB
ALBUMIN SERPL-MCNC: 4.7 G/DL (ref 3.5–5.2)
ALBUMIN/GLOB SERPL: 1.8 G/DL
ALP SERPL-CCNC: 97 U/L (ref 39–117)
ALT SERPL W P-5'-P-CCNC: 89 U/L (ref 1–41)
AMMONIA BLD-SCNC: 45 UMOL/L (ref 16–60)
ANION GAP SERPL CALCULATED.3IONS-SCNC: 7.6 MMOL/L (ref 5–15)
AST SERPL-CCNC: 45 U/L (ref 1–40)
BASOPHILS # BLD MANUAL: 0.06 10*3/MM3 (ref 0–0.2)
BASOPHILS NFR BLD AUTO: 1 % (ref 0–1.5)
BILIRUB SERPL-MCNC: 1.1 MG/DL (ref 0–1.2)
BILIRUB UR QL STRIP: NEGATIVE
BUN SERPL-MCNC: 8 MG/DL (ref 6–20)
BUN/CREAT SERPL: 7.9 (ref 7–25)
CALCIUM SPEC-SCNC: 11.9 MG/DL (ref 8.6–10.5)
CHLORIDE SERPL-SCNC: 104 MMOL/L (ref 98–107)
CLARITY UR: ABNORMAL
CO2 SERPL-SCNC: 23.4 MMOL/L (ref 22–29)
COLOR UR: ABNORMAL
CREAT SERPL-MCNC: 1.01 MG/DL (ref 0.76–1.27)
DEPRECATED RDW RBC AUTO: 40 FL (ref 37–54)
ERYTHROCYTE [DISTWIDTH] IN BLOOD BY AUTOMATED COUNT: 12.1 % (ref 12.3–15.4)
GFR SERPL CREATININE-BSD FRML MDRD: 77 ML/MIN/1.73
GLOBULIN UR ELPH-MCNC: 2.6 GM/DL
GLUCOSE SERPL-MCNC: 105 MG/DL (ref 65–99)
GLUCOSE UR STRIP-MCNC: NEGATIVE MG/DL
HCT VFR BLD AUTO: 49.1 % (ref 37.5–51)
HGB BLD-MCNC: 17.4 G/DL (ref 13–17.7)
HGB UR QL STRIP.AUTO: NEGATIVE
KETONES UR QL STRIP: ABNORMAL
LEUKOCYTE ESTERASE UR QL STRIP.AUTO: NEGATIVE
LYMPHOCYTES # BLD MANUAL: 1.67 10*3/MM3 (ref 0.7–3.1)
LYMPHOCYTES NFR BLD MANUAL: 2 % (ref 5–12)
LYMPHOCYTES NFR BLD MANUAL: 26 % (ref 19.6–45.3)
MCH RBC QN AUTO: 31.8 PG (ref 26.6–33)
MCHC RBC AUTO-ENTMCNC: 35.4 G/DL (ref 31.5–35.7)
MCV RBC AUTO: 89.8 FL (ref 79–97)
MONOCYTES # BLD AUTO: 0.13 10*3/MM3 (ref 0.1–0.9)
NEUTROPHILS # BLD AUTO: 4.56 10*3/MM3 (ref 1.7–7)
NEUTROPHILS NFR BLD MANUAL: 71 % (ref 42.7–76)
NITRITE UR QL STRIP: NEGATIVE
PH UR STRIP.AUTO: 5.5 [PH] (ref 5–8)
PLATELET # BLD AUTO: 176 10*3/MM3 (ref 140–450)
PMV BLD AUTO: 10.5 FL (ref 6–12)
POTASSIUM SERPL-SCNC: 3.9 MMOL/L (ref 3.5–5.2)
PROT SERPL-MCNC: 7.3 G/DL (ref 6–8.5)
PROT UR QL STRIP: NEGATIVE
RBC # BLD AUTO: 5.47 10*6/MM3 (ref 4.14–5.8)
RBC MORPH BLD: NORMAL
SMALL PLATELETS BLD QL SMEAR: ADEQUATE
SODIUM SERPL-SCNC: 135 MMOL/L (ref 136–145)
SP GR UR STRIP: 1.02 (ref 1–1.03)
UROBILINOGEN UR QL STRIP: ABNORMAL
WBC # BLD AUTO: 6.42 10*3/MM3 (ref 3.4–10.8)
WBC MORPH BLD: NORMAL

## 2020-08-21 PROCEDURE — 85007 BL SMEAR W/DIFF WBC COUNT: CPT | Performed by: NURSE PRACTITIONER

## 2020-08-21 PROCEDURE — 82140 ASSAY OF AMMONIA: CPT | Performed by: NURSE PRACTITIONER

## 2020-08-21 PROCEDURE — 80053 COMPREHEN METABOLIC PANEL: CPT | Performed by: NURSE PRACTITIONER

## 2020-08-21 PROCEDURE — 81003 URINALYSIS AUTO W/O SCOPE: CPT | Performed by: NURSE PRACTITIONER

## 2020-08-21 PROCEDURE — 85027 COMPLETE CBC AUTOMATED: CPT | Performed by: NURSE PRACTITIONER

## 2020-10-23 ENCOUNTER — HOSPITAL ENCOUNTER (OUTPATIENT)
Age: 55
Setting detail: OBSERVATION
Discharge: HOME | End: 2020-10-25
Attending: PEDIATRICS | Admitting: HOSPITALIST
Payer: MEDICARE

## 2020-10-23 ENCOUNTER — APPOINTMENT (OUTPATIENT)
Dept: CT IMAGING | Age: 55
End: 2020-10-23
Payer: MEDICARE

## 2020-10-23 ENCOUNTER — APPOINTMENT (OUTPATIENT)
Dept: GENERAL RADIOLOGY | Age: 55
End: 2020-10-23
Payer: MEDICARE

## 2020-10-23 LAB
ALBUMIN SERPL-MCNC: 4.2 G/DL (ref 3.5–5.2)
ALP BLD-CCNC: 106 U/L (ref 40–130)
ALT SERPL-CCNC: 30 U/L (ref 5–41)
ANION GAP SERPL CALCULATED.3IONS-SCNC: 10 MMOL/L (ref 7–19)
AST SERPL-CCNC: 24 U/L (ref 5–40)
BASOPHILS ABSOLUTE: 0.1 K/UL (ref 0–0.2)
BASOPHILS RELATIVE PERCENT: 0.6 % (ref 0–1)
BILIRUB SERPL-MCNC: 0.6 MG/DL (ref 0.2–1.2)
BUN BLDV-MCNC: 9 MG/DL (ref 6–20)
CALCIUM SERPL-MCNC: 11.7 MG/DL (ref 8.6–10)
CHLORIDE BLD-SCNC: 104 MMOL/L (ref 98–111)
CO2: 24 MMOL/L (ref 22–29)
CREAT SERPL-MCNC: 0.8 MG/DL (ref 0.5–1.2)
EOSINOPHILS ABSOLUTE: 0.2 K/UL (ref 0–0.6)
EOSINOPHILS RELATIVE PERCENT: 2.7 % (ref 0–5)
GFR AFRICAN AMERICAN: >59
GFR NON-AFRICAN AMERICAN: >60
GLUCOSE BLD-MCNC: 99 MG/DL (ref 74–109)
HCT VFR BLD CALC: 49.3 % (ref 42–52)
HEMOGLOBIN: 16.6 G/DL (ref 14–18)
IMMATURE GRANULOCYTES #: 0 K/UL
LACTIC ACID: 1.5 MMOL/L (ref 0.5–1.9)
LYMPHOCYTES ABSOLUTE: 2.6 K/UL (ref 1.1–4.5)
LYMPHOCYTES RELATIVE PERCENT: 28.5 % (ref 20–40)
MCH RBC QN AUTO: 32.2 PG (ref 27–31)
MCHC RBC AUTO-ENTMCNC: 33.7 G/DL (ref 33–37)
MCV RBC AUTO: 95.7 FL (ref 80–94)
MONOCYTES ABSOLUTE: 0.7 K/UL (ref 0–0.9)
MONOCYTES RELATIVE PERCENT: 7.2 % (ref 0–10)
NEUTROPHILS ABSOLUTE: 5.5 K/UL (ref 1.5–7.5)
NEUTROPHILS RELATIVE PERCENT: 60.8 % (ref 50–65)
PDW BLD-RTO: 12.7 % (ref 11.5–14.5)
PLATELET # BLD: 173 K/UL (ref 130–400)
PMV BLD AUTO: 11.3 FL (ref 9.4–12.4)
POTASSIUM REFLEX MAGNESIUM: 4.3 MMOL/L (ref 3.5–5)
RBC # BLD: 5.15 M/UL (ref 4.7–6.1)
SODIUM BLD-SCNC: 138 MMOL/L (ref 136–145)
TOTAL PROTEIN: 6.8 G/DL (ref 6.6–8.7)
TROPONIN: <0.01 NG/ML (ref 0–0.03)
WBC # BLD: 9 K/UL (ref 4.8–10.8)

## 2020-10-23 PROCEDURE — 99999 PR OFFICE/OUTPT VISIT,PROCEDURE ONLY: CPT | Performed by: PEDIATRICS

## 2020-10-23 PROCEDURE — 36415 COLL VENOUS BLD VENIPUNCTURE: CPT

## 2020-10-23 PROCEDURE — 99283 EMERGENCY DEPT VISIT LOW MDM: CPT

## 2020-10-23 PROCEDURE — 80053 COMPREHEN METABOLIC PANEL: CPT

## 2020-10-23 PROCEDURE — 83605 ASSAY OF LACTIC ACID: CPT

## 2020-10-23 PROCEDURE — 84484 ASSAY OF TROPONIN QUANT: CPT

## 2020-10-23 PROCEDURE — 85025 COMPLETE CBC W/AUTO DIFF WBC: CPT

## 2020-10-23 PROCEDURE — 70450 CT HEAD/BRAIN W/O DYE: CPT

## 2020-10-23 PROCEDURE — 71045 X-RAY EXAM CHEST 1 VIEW: CPT

## 2020-10-23 ASSESSMENT — PAIN DESCRIPTION - DESCRIPTORS: DESCRIPTORS: TINGLING

## 2020-10-23 ASSESSMENT — PAIN DESCRIPTION - LOCATION: LOCATION: SHOULDER;LEG

## 2020-10-24 PROBLEM — R53.1 WEAKNESS: Status: ACTIVE | Noted: 2020-10-24

## 2020-10-24 LAB
BILIRUBIN URINE: NEGATIVE
BLOOD, URINE: NEGATIVE
C-REACTIVE PROTEIN: 0.1 MG/DL (ref 0–0.5)
CLARITY: CLEAR
COLOR: YELLOW
GLUCOSE URINE: NEGATIVE MG/DL
KETONES, URINE: NEGATIVE MG/DL
LEUKOCYTE ESTERASE, URINE: NEGATIVE
NITRITE, URINE: NEGATIVE
PH UA: 5 (ref 5–8)
PROTEIN UA: NEGATIVE MG/DL
SEDIMENTATION RATE, ERYTHROCYTE: 5 MM/HR (ref 0–15)
SPECIFIC GRAVITY UA: 1.02 (ref 1–1.03)
UROBILINOGEN, URINE: 0.2 E.U./DL

## 2020-10-24 PROCEDURE — G0378 HOSPITAL OBSERVATION PER HR: HCPCS

## 2020-10-24 PROCEDURE — 97530 THERAPEUTIC ACTIVITIES: CPT

## 2020-10-24 PROCEDURE — 85652 RBC SED RATE AUTOMATED: CPT

## 2020-10-24 PROCEDURE — 83516 IMMUNOASSAY NONANTIBODY: CPT

## 2020-10-24 PROCEDURE — 86140 C-REACTIVE PROTEIN: CPT

## 2020-10-24 PROCEDURE — 97161 PT EVAL LOW COMPLEX 20 MIN: CPT

## 2020-10-24 PROCEDURE — 6370000000 HC RX 637 (ALT 250 FOR IP): Performed by: HOSPITALIST

## 2020-10-24 PROCEDURE — 99220 PR INITIAL OBSERVATION CARE/DAY 70 MINUTES: CPT | Performed by: PSYCHIATRY & NEUROLOGY

## 2020-10-24 PROCEDURE — 92523 SPEECH SOUND LANG COMPREHEN: CPT

## 2020-10-24 PROCEDURE — 36415 COLL VENOUS BLD VENIPUNCTURE: CPT

## 2020-10-24 PROCEDURE — 92610 EVALUATE SWALLOWING FUNCTION: CPT

## 2020-10-24 PROCEDURE — 93005 ELECTROCARDIOGRAM TRACING: CPT | Performed by: PEDIATRICS

## 2020-10-24 PROCEDURE — 83519 RIA NONANTIBODY: CPT

## 2020-10-24 PROCEDURE — 2580000003 HC RX 258: Performed by: HOSPITALIST

## 2020-10-24 PROCEDURE — 81003 URINALYSIS AUTO W/O SCOPE: CPT

## 2020-10-24 RX ORDER — PRIMIDONE 50 MG/1
100 TABLET ORAL NIGHTLY
Status: DISCONTINUED | OUTPATIENT
Start: 2020-10-24 | End: 2020-10-25 | Stop reason: HOSPADM

## 2020-10-24 RX ORDER — BACLOFEN 10 MG/1
5 TABLET ORAL 2 TIMES DAILY
Status: DISCONTINUED | OUTPATIENT
Start: 2020-10-24 | End: 2020-10-25 | Stop reason: HOSPADM

## 2020-10-24 RX ORDER — ATORVASTATIN CALCIUM 20 MG/1
20 TABLET, FILM COATED ORAL NIGHTLY
Status: DISCONTINUED | OUTPATIENT
Start: 2020-10-24 | End: 2020-10-25 | Stop reason: HOSPADM

## 2020-10-24 RX ORDER — MEGESTROL ACETATE 40 MG/1
40 TABLET ORAL DAILY
Status: DISCONTINUED | OUTPATIENT
Start: 2020-10-24 | End: 2020-10-25 | Stop reason: HOSPADM

## 2020-10-24 RX ORDER — MEGESTROL ACETATE 40 MG/1
40 TABLET ORAL DAILY
COMMUNITY

## 2020-10-24 RX ORDER — ASPIRIN 81 MG/1
81 TABLET ORAL DAILY
Status: DISCONTINUED | OUTPATIENT
Start: 2020-10-24 | End: 2020-10-25 | Stop reason: HOSPADM

## 2020-10-24 RX ORDER — PANTOPRAZOLE SODIUM 20 MG/1
20 TABLET, DELAYED RELEASE ORAL
Status: DISCONTINUED | OUTPATIENT
Start: 2020-10-24 | End: 2020-10-25 | Stop reason: HOSPADM

## 2020-10-24 RX ORDER — DONEPEZIL HYDROCHLORIDE 10 MG/1
10 TABLET, ORALLY DISINTEGRATING ORAL NIGHTLY
COMMUNITY

## 2020-10-24 RX ORDER — BACLOFEN 10 MG/1
10 TABLET ORAL 2 TIMES DAILY
COMMUNITY

## 2020-10-24 RX ORDER — OMEPRAZOLE 40 MG/1
40 CAPSULE, DELAYED RELEASE ORAL DAILY
COMMUNITY

## 2020-10-24 RX ORDER — MIRTAZAPINE 15 MG/1
15 TABLET, FILM COATED ORAL NIGHTLY
COMMUNITY

## 2020-10-24 RX ORDER — CLOPIDOGREL BISULFATE 75 MG/1
75 TABLET ORAL DAILY
Status: DISCONTINUED | OUTPATIENT
Start: 2020-10-24 | End: 2020-10-25 | Stop reason: HOSPADM

## 2020-10-24 RX ORDER — TOPIRAMATE 50 MG/1
25 TABLET, FILM COATED ORAL 2 TIMES DAILY
COMMUNITY

## 2020-10-24 RX ORDER — DONEPEZIL HYDROCHLORIDE 5 MG/1
10 TABLET, FILM COATED ORAL NIGHTLY
Status: DISCONTINUED | OUTPATIENT
Start: 2020-10-24 | End: 2020-10-25 | Stop reason: HOSPADM

## 2020-10-24 RX ORDER — ASPIRIN 81 MG/1
81 TABLET ORAL DAILY
COMMUNITY

## 2020-10-24 RX ORDER — MIRTAZAPINE 15 MG/1
15 TABLET, FILM COATED ORAL NIGHTLY
Status: DISCONTINUED | OUTPATIENT
Start: 2020-10-24 | End: 2020-10-25 | Stop reason: HOSPADM

## 2020-10-24 RX ORDER — ATORVASTATIN CALCIUM 20 MG/1
20 TABLET, FILM COATED ORAL NIGHTLY
COMMUNITY

## 2020-10-24 RX ORDER — TAMSULOSIN HYDROCHLORIDE 0.4 MG/1
0.4 CAPSULE ORAL DAILY
COMMUNITY

## 2020-10-24 RX ORDER — ATENOLOL 50 MG/1
50 TABLET ORAL DAILY
COMMUNITY

## 2020-10-24 RX ORDER — TAMSULOSIN HYDROCHLORIDE 0.4 MG/1
0.4 CAPSULE ORAL DAILY
Status: DISCONTINUED | OUTPATIENT
Start: 2020-10-24 | End: 2020-10-25 | Stop reason: HOSPADM

## 2020-10-24 RX ORDER — PRIMIDONE 50 MG/1
100 TABLET ORAL NIGHTLY
COMMUNITY

## 2020-10-24 RX ORDER — M-VIT,TX,IRON,MINS/CALC/FOLIC 27MG-0.4MG
1 TABLET ORAL DAILY
COMMUNITY

## 2020-10-24 RX ORDER — M-VIT,TX,IRON,MINS/CALC/FOLIC 27MG-0.4MG
1 TABLET ORAL DAILY
Status: DISCONTINUED | OUTPATIENT
Start: 2020-10-24 | End: 2020-10-25 | Stop reason: HOSPADM

## 2020-10-24 RX ORDER — BUSPIRONE HYDROCHLORIDE 15 MG/1
15 TABLET ORAL 2 TIMES DAILY
Status: DISCONTINUED | OUTPATIENT
Start: 2020-10-24 | End: 2020-10-25 | Stop reason: HOSPADM

## 2020-10-24 RX ORDER — TOPIRAMATE 50 MG/1
25 TABLET, FILM COATED ORAL 2 TIMES DAILY
Status: DISCONTINUED | OUTPATIENT
Start: 2020-10-24 | End: 2020-10-25 | Stop reason: HOSPADM

## 2020-10-24 RX ORDER — CLOPIDOGREL BISULFATE 75 MG/1
75 TABLET ORAL DAILY
COMMUNITY

## 2020-10-24 RX ORDER — BUSPIRONE HYDROCHLORIDE 15 MG/1
15 TABLET ORAL 2 TIMES DAILY
COMMUNITY

## 2020-10-24 RX ORDER — SODIUM CHLORIDE 9 MG/ML
INJECTION, SOLUTION INTRAVENOUS CONTINUOUS
Status: DISCONTINUED | OUTPATIENT
Start: 2020-10-24 | End: 2020-10-25 | Stop reason: HOSPADM

## 2020-10-24 RX ORDER — ATENOLOL 50 MG/1
50 TABLET ORAL DAILY
Status: DISCONTINUED | OUTPATIENT
Start: 2020-10-24 | End: 2020-10-25 | Stop reason: HOSPADM

## 2020-10-24 RX ADMIN — PANTOPRAZOLE SODIUM 20 MG: 20 TABLET, DELAYED RELEASE ORAL at 05:43

## 2020-10-24 RX ADMIN — BUSPIRONE HYDROCHLORIDE 15 MG: 15 TABLET ORAL at 22:16

## 2020-10-24 RX ADMIN — SODIUM CHLORIDE: 9 INJECTION, SOLUTION INTRAVENOUS at 05:42

## 2020-10-24 RX ADMIN — ASPIRIN 81 MG: 81 TABLET, COATED ORAL at 07:27

## 2020-10-24 RX ADMIN — TOPIRAMATE 25 MG: 50 TABLET, FILM COATED ORAL at 22:16

## 2020-10-24 RX ADMIN — ATENOLOL 50 MG: 50 TABLET ORAL at 07:28

## 2020-10-24 RX ADMIN — DONEPEZIL HYDROCHLORIDE 10 MG: 5 TABLET, FILM COATED ORAL at 22:15

## 2020-10-24 RX ADMIN — MEGESTROL ACETATE 40 MG: 40 TABLET ORAL at 07:27

## 2020-10-24 RX ADMIN — MULTIPLE VITAMINS W/ MINERALS TAB 1 TABLET: TAB at 07:28

## 2020-10-24 RX ADMIN — BACLOFEN 5 MG: 10 TABLET ORAL at 22:15

## 2020-10-24 RX ADMIN — BUSPIRONE HYDROCHLORIDE 15 MG: 15 TABLET ORAL at 07:27

## 2020-10-24 RX ADMIN — TAMSULOSIN HYDROCHLORIDE 0.4 MG: 0.4 CAPSULE ORAL at 07:28

## 2020-10-24 RX ADMIN — MIRTAZAPINE 15 MG: 15 TABLET, FILM COATED ORAL at 22:15

## 2020-10-24 RX ADMIN — PRIMIDONE 100 MG: 50 TABLET ORAL at 22:16

## 2020-10-24 RX ADMIN — BACLOFEN 5 MG: 10 TABLET ORAL at 07:27

## 2020-10-24 RX ADMIN — TOPIRAMATE 25 MG: 50 TABLET, FILM COATED ORAL at 07:27

## 2020-10-24 RX ADMIN — CLOPIDOGREL BISULFATE 75 MG: 75 TABLET ORAL at 07:28

## 2020-10-24 RX ADMIN — ATORVASTATIN CALCIUM 20 MG: 20 TABLET, FILM COATED ORAL at 22:16

## 2020-10-24 ASSESSMENT — ENCOUNTER SYMPTOMS
ABDOMINAL PAIN: 0
EYE DISCHARGE: 0
VOMITING: 0
COUGH: 0
COLOR CHANGE: 0
NAUSEA: 0
SHORTNESS OF BREATH: 0
BACK PAIN: 0
RHINORRHEA: 0

## 2020-10-24 ASSESSMENT — PAIN DESCRIPTION - PROGRESSION
CLINICAL_PROGRESSION: GRADUALLY IMPROVING
CLINICAL_PROGRESSION: NOT CHANGED

## 2020-10-24 ASSESSMENT — PAIN DESCRIPTION - DESCRIPTORS
DESCRIPTORS: DISCOMFORT
DESCRIPTORS: DISCOMFORT

## 2020-10-24 ASSESSMENT — PAIN DESCRIPTION - FREQUENCY
FREQUENCY: INTERMITTENT
FREQUENCY: CONTINUOUS

## 2020-10-24 ASSESSMENT — PAIN DESCRIPTION - LOCATION
LOCATION: SHOULDER
LOCATION: OTHER (COMMENT)

## 2020-10-24 ASSESSMENT — PAIN DESCRIPTION - PAIN TYPE
TYPE: ACUTE PAIN
TYPE: ACUTE PAIN

## 2020-10-24 ASSESSMENT — PAIN DESCRIPTION - ORIENTATION
ORIENTATION: OTHER (COMMENT)
ORIENTATION: RIGHT

## 2020-10-24 ASSESSMENT — PAIN DESCRIPTION - DIRECTION: RADIATING_TOWARDS: BACK

## 2020-10-24 ASSESSMENT — PAIN SCALES - GENERAL
PAINLEVEL_OUTOF10: 4
PAINLEVEL_OUTOF10: 7

## 2020-10-24 ASSESSMENT — PAIN DESCRIPTION - ONSET
ONSET: GRADUAL
ONSET: GRADUAL

## 2020-10-24 NOTE — CONSULTS
excessive bleeding. Psychiatric - no severe anxiety or nervousness. All other review of systems are negative. ?   Past Medical History:      Diagnosis Date    Anxiety     [facility] onset [facility]'s dementia ([facility]     Hyperlipidemia     Hypertension     [facility]     TIA (transient ischemic attack)      Past Surgical History:      Procedure Laterality Date    BACK SURGERY      CHOLECYSTECTOMY      LEG SURGERY       Medications in Hospital:     Current Facility-Administered Medications:     0.9 % sodium chloride infusion, , Intravenous, Continuous, Breanne Cruz MD, Last Rate: 75 mL/hr at 10/24/20 0542    aspirin EC tablet 81 mg, 81 mg, Oral, Daily, Breanne Cruz MD, 81 mg at 10/24/20 3265    atenolol (TENORMIN) tablet 50 mg, 50 mg, Oral, Daily, Breanne Cruz MD, 50 mg at 10/24/20 2038    atorvastatin (LIPITOR) tablet 20 mg, 20 mg, Oral, Nightly, Breanne Cruz MD    baclofen (LIORESAL) tablet 5 mg, 5 mg, Oral, BID, Breanne Cruz MD, 5 mg at 10/24/20 3232    busPIRone (BUSPAR) tablet 15 mg, 15 mg, Oral, BID, Breanne Cruz MD, 15 mg at 10/24/20 9919    clopidogrel (PLAVIX) tablet 75 mg, 75 mg, Oral, Daily, Breanne Cruz MD, 75 mg at 10/24/20 1320    donepezil (ARICEPT) tablet 10 mg, 10 mg, Oral, Nightly, Breanne Cruz MD    megestrol (MEGACE) tablet 40 mg, 40 mg, Oral, Daily, Breanne Cruz MD, 40 mg at 10/24/20 0727    mirtazapine (REMERON) tablet 15 mg, 15 mg, Oral, Nightly, Breanne Cruz MD    therapeutic multivitamin-minerals 1 tablet, 1 tablet, Oral, Daily, Breanne Cruz MD, 1 tablet at 10/24/20 0728    pantoprazole (PROTONIX) tablet 20 mg, 20 mg, Oral, QAM AC, Breanne Cruz MD, 20 mg at 10/24/20 0543    tamsulosin (FLOMAX) capsule 0.4 mg, 0.4 mg, Oral, Daily, Breanne Cruz MD, 0.4 mg at 10/24/20 0728    topiramate (TOPAMAX) tablet 25 mg, 25 mg, Oral, BID, Stan Neely MD, 25 mg at 10/24/20 9025    primidone (MYSOLINE) tablet 100 mg, 100 mg, Oral, Nightly, Stan Neely MD  Allergies: Patient has no known allergies. Social History:   TOBACCO:  reports that he has never smoked. He has never used smokeless tobacco.  ETOH:  reports current alcohol use. Family History:   History reviewed. No pertinent family history. ?  ?  Physical Exam:    Vitals: /79   Pulse 78   Temp 98 °F (36.7 °C)   Resp 16   Ht 6' (1.829 m)   Wt 200 lb (90.7 kg)   SpO2 94%   BMI 27.12 kg/m²   Constitutional - well developed, well nourished. Eyes - conjunctiva normal. Pupils react to light  Ear, nose, throat -hearing intact to finger rub No scars, masses, or lesions over external nose or ears, no atrophy of tongue  Neck-symmetric, no masses noted, no jugular vein distension  Respiration- chest wall appears symmetric, good expansion,   normal effort without use of accessory muscles  Musculoskeletal - no significant wasting of muscles noted, no bony deformities  Extremities-no clubbing, cyanosis or edema  Skin - warm, dry, and intact. No rash, erythema, or pallor. Psychiatric - mood, affect, and behavior appear normal.   Neurological exam  Awake, alert, fluent oriented x 3 appropriate affect  Attention and concentration appear appropriate  Immediate recall 3/4. Short-term memory at 5 minutes 0/4. He is able to tell me what floor we are on and that wearing hospital in [place] and knows the day of the week and the month. Speech normal without dysarthria  No clear issues with language of fund of knowledge  Cranial Nerve Exam   CN II- Visual fields grossly unremarkable. Questionable decreased left lower field.   CN III, IV,VI-EOMI, No nystagmus, conjugate eye movements, no ptosis  CN V-sensation intact to LT over face  CN VII-no facial assymetry  CN VIII-Hearing intact to voice  CN IX and X- Palate elevates in midline  CN XI-good shoulder shrug  CN XII-Tongue midline with no fasciculations or fibrillations  Motor Exam  Antalgic weakness in the right shoulder. Holds his hands flexed with his thumb between the second and third fingers but when distracted he is no longer doing that. I am able to get him to extend his hands. Says they voluntarily want to contract into a fist.  He is not hyperreflexic. His strength is normal throughout. Sensory Exam  Sensation intact to light touch and temperature upper and lower extremities bilaterally  Reflexes   Trace throughout. No Babinski  No clonus ankles  No Bruce's sign bilateral hands  Tremors- no tremors in hands or head noted  Gait  Patient is able to stand but feels unsteady. Appears to have some aphasia abasia  Coordination  No clear ataxia  ? ?  CBC:   Recent Labs     10/23/20  2203   WBC 9.0   HGB 16.6        BMP:   Recent Labs     10/23/20  2203      K 4.3      CO2 24   BUN 9   CREATININE 0.8   GLUCOSE 99     Hepatic:   Recent Labs     10/23/20  2203   AST 24   ALT 30   BILITOT 0.6   ALKPHOS 106     Lipids: No results for input(s): CHOL, HDL in the last 72 hours. Invalid input(s): LDLCALCU  INR: No results for input(s): INR in the last 72 hours. ?  ?  Assessment and Plan   1. I am not fully convinced that he has dementia nor that he has an underlying neurological problem otherwise. Some of his symptoms sound somewhat suspicious for myasthenia gravis but he has a great deal of embellishment on examination. I cannot think of one neurological problem that would be causing all of his symptoms. Patient is somewhat distraught over this. I will order an MRI of the head as well as some blood work. He can get outpatient conduction studies. He has many inconsistencies on examination.   ?  ?      Cyndee Salazar MD  Board Certified in Neurology

## 2020-10-24 NOTE — ED PROVIDER NOTES
[address] [facility] [[email or web address]  eMERGENCY dEPARTMENT eNCOUnter      Pt Name: Radha Cespedes  MRN: [id number]  [place] 1965  Date of evaluation: 10/23/2020  Provider: Kelly Maloney MD    CHIEF COMPLAINT       Chief Complaint   Patient presents with    Fatigue     pt states his neurologist from [address] [facility] told him to come. HISTORY OF PRESENT ILLNESS   (Location/Symptom, Timing/Onset,Context/Setting, Quality, Duration, Modifying Factors, Severity)  Note limiting factors. Radha Cespedes is a 54 y.o. male who presents to the emergency department by his neurologist and [place] for further work-up. Patient states that he has been weak for the past 2 years. Weakness has been episodic until June 2020. Patient was still walking with a cane assistance until July 2020. Patient is now only ambulatory by wheelchair. Patient states \"I cannot control my bladder. \"  Patient is also concerned that he has difficulty breathing when laying flat. Patient has difficulty swallowing and has to thicken his fluids. Patient states he intermittently has double vision in his left eye. Patient is also had to work at increasing his vocal volume as it was \"too quiet he could not talk. \"  Patient has lost 60 pounds in the past 1 year. Patient denies current cough, congestion, fever, difficulty breathing, chest pain, abdominal pain, vomiting, diarrhea, burning with urination, or passing out. HPI    NursingNotes were reviewed. REVIEW OF SYSTEMS    (2-9 systems for level 4, 10 or more for level 5)     Review of Systems   Constitutional: Positive for activity change, appetite change and fatigue. Negative for chills and fever. HENT: Negative for congestion and rhinorrhea. Eyes: Positive for visual disturbance. Negative for discharge. Respiratory: Negative for cough and shortness of breath. Cardiovascular: Negative for chest pain and palpitations. Gastrointestinal: Negative for abdominal pain, nausea and vomiting. Genitourinary: Negative for difficulty urinating and dysuria. Musculoskeletal: Negative for back pain and neck pain. Skin: Negative for color change and pallor. Neurological: Positive for speech difficulty, weakness and light-headedness. Negative for syncope. Psychiatric/Behavioral: Negative for agitation and confusion. All other systems reviewed and are negative. PAST MEDICALHISTORY     Past Medical History:   Diagnosis Date    Anxiety     [facility] onset [facility]'s dementia ([facility]     Hyperlipidemia     Hypertension     Stroke ([facility]     TIA (transient ischemic attack)          SURGICAL HISTORY       Past Surgical History:   Procedure Laterality Date    BACK SURGERY      CHOLECYSTECTOMY      LEG SURGERY           CURRENT MEDICATIONS     Previous Medications    ASPIRIN 81 MG EC TABLET    Take 81 mg by mouth daily    ATENOLOL (TENORMIN) 50 MG TABLET    Take 50 mg by mouth daily    ATORVASTATIN (LIPITOR) 20 MG TABLET    Take 20 mg by mouth nightly    BACLOFEN (LIORESAL) 10 MG TABLET    Take 10 mg by mouth 2 times daily    BUSPIRONE (BUSPAR) 15 MG TABLET    Take 15 mg by mouth 2 times daily    CLOPIDOGREL (PLAVIX) 75 MG TABLET    Take 75 mg by mouth daily    DONEPEZIL (ARICEPT ODT) 10 MG DISINTEGRATING TABLET    Take 10 mg by mouth nightly    MEGESTROL (MEGACE) 40 MG TABLET    Take 40 mg by mouth daily    MIRTAZAPINE (REMERON) 15 MG TABLET    Take 15 mg by mouth nightly    MULTIPLE VITAMINS-MINERALS (THERAPEUTIC MULTIVITAMIN-MINERALS) TABLET    Take 1 tablet by mouth daily    OMEPRAZOLE (PRILOSEC) 40 MG DELAYED RELEASE CAPSULE    Take 40 mg by mouth daily    PRIMIDONE (MYSOLINE) 50 MG TABLET    Take 100 mg by mouth nightly    TAMSULOSIN (FLOMAX) 0.4 MG CAPSULE    Take 0.4 mg by mouth daily    TOPIRAMATE (TOPAMAX) 50 MG TABLET    Take 25 mg by mouth 2 times daily       ALLERGIES     Patient has no known allergies. FAMILY HISTORY     History reviewed. No pertinent family history. SOCIAL HISTORY       Social History     Socioeconomic History    Marital status: Legally      Spouse name: None    Number of children: None    Years of education: None    Highest education level: None   Occupational History    None   Social Needs    Financial resource strain: None    Food insecurity     Worry: None     Inability: None    Transportation needs     Medical: None     Non-medical: None   Tobacco Use    Smoking status: Never Smoker    Smokeless tobacco: Never Used   Substance and Sexual Activity    Alcohol use: Yes     Comment: occasional    Drug use: Never    Sexual activity: None   Lifestyle    Physical activity     Days per week: None     Minutes per session: None    Stress: None   Relationships    Social connections     Talks on phone: None     Gets together: None     Attends Yarsani service: None     Active member of club or organization: None     Attends meetings of clubs or organizations: None     Relationship status: None    Intimate partner violence     Fear of current or ex partner: None     Emotionally abused: None     Physically abused: None     Forced sexual activity: None   Other Topics Concern    None   Social History Narrative    None       SCREENINGS    [place] Coma Scale  Eye Opening: Spontaneous  Best Verbal Response: Oriented  Best Motor Response: Obeys commands  [place] Coma Scale Score: 15        PHYSICAL EXAM    (up to 7 for level 4, 8 or more for level 5)     ED Triage Vitals [10/23/20 2014]   BP Temp Temp Source Pulse Resp SpO2 Height Weight   118/80 99 °F (37.2 °C) Oral 90 20 96 % 6' (1.829 m) 200 lb (90.7 kg)       Physical Exam  Vitals signs and nursing note reviewed. Constitutional:       General: He is not in acute distress. Appearance: He is ill-appearing. HENT:      Head: Normocephalic and atraumatic.       Right Ear: External ear normal.      Left Ear: External ear normal.      Nose: Nose normal.      Mouth/Throat:      Mouth: Mucous membranes are moist.      Pharynx: Oropharynx is clear. No oropharyngeal exudate. Eyes:      General: No scleral icterus. Conjunctiva/sclera: Conjunctivae normal.      Pupils: Pupils are equal, round, and reactive to light. Neck:      Musculoskeletal: Neck supple. No neck rigidity. Cardiovascular:      Rate and Rhythm: Normal rate and regular rhythm. Pulses: Normal pulses. Heart sounds: Normal heart sounds. Pulmonary:      Effort: Pulmonary effort is normal.      Breath sounds: Normal breath sounds. Abdominal:      General: Bowel sounds are normal.      Palpations: Abdomen is soft. Tenderness: There is no abdominal tenderness. There is no guarding. Musculoskeletal:         General: Deformity (Contractures of upper extremities with fisting of hands.) present. No tenderness. Skin:     General: Skin is warm and dry. Capillary Refill: Capillary refill takes less than 2 seconds. Coloration: Skin is pale. Skin is not jaundiced. Neurological:      General: No focal deficit present. Mental Status: He is alert and oriented to person, place, and time. Mental status is at baseline. GCS: GCS eye subscore is 4. GCS verbal subscore is 5. GCS motor subscore is 6. Cranial Nerves: No dysarthria or facial asymmetry. Sensory: Sensation is intact. Motor: Weakness and abnormal muscle tone present. Coordination: Coordination normal.      Comments: Patient has generalized weakness but weakness is worse in left lower extremity and right upper extremity. Patient has contractures of upper extremities with fisting of hands. Patient has difficulty lifting either leg off of the bed.  strengths are greatly diminished. Patient has difficulty opening hands. Central core strength is diminished and patient is unable to bring himself to sitting on his own.    Psychiatric:         Attention and Perception: Attention normal.         Mood and Affect: Mood is anxious. Speech: Speech normal.         Behavior: Behavior normal. Behavior is cooperative. Cognition and Memory: Cognition normal.         DIAGNOSTIC RESULTS     EKG: All EKG's areinterpreted by the Emergency Department Physician who either signs or Co-signs this chart in the absence of a cardiologist.    EKG dated 10/24/2020 at 000 1 AM: Normal sinus rhythm, rate 83. Low voltage. Abnormal R wave progression in V3.    RADIOLOGY:  Non-plain film images such as CT, Ultrasound and MRI are read by the radiologist. Plain radiographic images are visualized and preliminarily interpreted bythe emergency physician with the below findings:      CT Head WO Contrast   Final Result   1. No acute intracranial process. Signed by Dr Negar Chandler on 10/23/2020 10:36 PM      XR CHEST PORTABLE   Final Result   1. No radiographic evidence of acute cardiopulmonary process. Signed by Dr Negar Chandler on 10/23/2020 10:31 PM              LABS:  Labs Reviewed   CBC WITH AUTO DIFFERENTIAL - Abnormal; Notable for the following components:       Result Value    MCV 95.7 (*)     MCH 32.2 (*)     All other components within normal limits   COMPREHENSIVE METABOLIC PANEL W/ REFLEX TO MG FOR LOW K - Abnormal; Notable for the following components:    Calcium 11.7 (*)     All other components within normal limits   TROPONIN   LACTIC ACID, PLASMA   URINE RT REFLEX TO CULTURE       All other labs were within normal range or not returned as of this dictation. EMERGENCY DEPARTMENT COURSE and DIFFERENTIAL DIAGNOSIS/MDM:   Vitals:    Vitals:    10/23/20 2014 10/24/20 0019   BP: 118/80 115/88   Pulse: 90 94   Resp: 20 20   Temp: 99 °F (37.2 °C)    TempSrc: Oral    SpO2: 96% 98%   Weight: 200 lb (90.7 kg)    Height: 6' (1.829 m)        MDM  54-year-old male presents with worsening symptoms of what is likely a neurodegenerative disease. Lab, EKG, and radiology results reviewed.   Discussed with Dr. Pedro Pablo Flaherty, attending hospitalist, who agrees to admit patient for further evaluation and treatment. CONSULTS:  None    PROCEDURES:  Unless otherwise noted below, none     Procedures    FINAL IMPRESSION      1. Degenerative disease of nervous system, unspecified ([facility]    2. Generalized weakness    3. Impaired mobility and ADLs          DISPOSITION/PLAN   DISPOSITION Decision To Admit 10/23/2020 11:25:14 PM      PATIENT REFERRED TO:  No follow-up provider specified.     DISCHARGE MEDICATIONS:  New Prescriptions    No medications on file          (Please note that portions of this note were completed with a voice recognition program.  Efforts were made to edit thedictations but occasionally words are mis-transcribed.)    Lazarus Neat, MD (electronically signed)  Attending Emergency Physician          Lazarus Neat, MD  10/24/20 9178

## 2020-10-24 NOTE — PROGRESS NOTES
Physical Therapy    Facility/Department: [facility] SURG SERVICES  Initial Assessment    NAME: Shari Almanzar  : 1965  MRN: [id number]    Date of Service: 10/24/2020    Discharge Recommendations:  Continue to assess pending progress, Patient would benefit from continued therapy after discharge        Assessment   Body structures, Functions, Activity limitations: Decreased functional mobility ; Decreased ADL status; Decreased ROM; Decreased strength;Decreased balance; Increased pain  Assessment: PRESENTS WITH GLOBAL WEAKNESS AND DECREASED MOBILITY. ABLE TO STAND BRIEFLY BUT UNSTEADY. DID NOT ATT GT AT THIS TIME, PER Pt HAS NOT AMB IN A FEW MONTHS. PT Education: PT Role;Plan of Care  REQUIRES PT FOLLOW UP: Yes  Activity Tolerance  Activity Tolerance: Patient Tolerated treatment well       Patient Diagnosis(es): The primary encounter diagnosis was Degenerative disease of nervous system, unspecified ([facility] [address] Diagnoses of Generalized weakness and Impaired mobility and ADLs were also pertinent to this visit. has a past medical history of Anxiety, Early onset Alzheimer's dementia ([facility] [address] Hyperlipidemia, Hypertension, Stroke ([address] and TIA (transient ischemic attack). has a past surgical history that includes back surgery; Leg Surgery; and Cholecystectomy.     Restrictions  Restrictions/Precautions  Restrictions/Precautions: Fall Risk  Vision/Hearing        Subjective  General  Diagnosis: PROGRESSIVE WEAKNESS  Subjective  Subjective: Pt IS FRUSTRATED THAT DOCTOR TOLD HIM THERE'S NOTHING WRONG          Orientation  Orientation  Overall Orientation Status: Within Normal Limits  Social/Functional History  Social/Functional History  Lives With: Alone  Type of Home: Apartment  Home Layout: One level  Home Access: Level entry  Bathroom Shower/Tub: Tub/Shower unit  Bathroom Toilet: Standard  Bathroom Equipment: Tub transfer bench, Hand-held shower  Home Equipment: Hospital bed, Justin Malou, Quad cane  Receives Help From: Home health, Friend(s)  ADL Assistance: Needs assistance  Homemaking Assistance: Needs assistance  Homemaking Responsibilities: No  Ambulation Assistance: (ESSENTIALLY NON-AMBULATORY SINCE JUNE/JULY, DOES TAKE A FEW STEPS WITH GRAB BARS IN BR)  Transfer Assistance: Independent  Active : No  Cognition   Cognition  Overall Cognitive Status: WFL    Objective     Observation/Palpation  Observation: HANDS POSITION IN FIST/CLAW, CANNOT FULLY OPEN ACTIVELY    PROM RLE (degrees)  RLE PROM: WNL  PROM LLE (degrees)  LLE PROM: WNL  AROM RUE (degrees)  RUE General AROM: LIMITED SHOULDER FLEX  AROM LUE (degrees)  LUE AROM : WFL  Strength RLE  R Hip Flexion: 3-/5  R Knee Extension: 3-/5  R Ankle Dorsiflexion: 4-/5  Strength LLE  L Hip Flexion: 3-/5  L Knee Extension: 3-/5  L Ankle Dorsiflexion: 4-/5     Sensation  Overall Sensation Status: (Pt REPORTS TINGLING/NUMBNESS IN BOTH HANDS, DISTAL TO ELBOW)  Bed mobility  Supine to Sit: Modified independent  Sit to Supine: Modified independent  Transfers  Sit to Stand: Minimal Assistance; Moderate Assistance  Stand to sit: Minimal Assistance; Moderate Assistance  Comment: 2-3 SIDE STEPS EOB WITH MIN-MOD ASSIST, UNSTEADY OVERALL ESPECIALLY WITH INITIAL STAND  Ambulation  Ambulation?: No     Balance  Sitting - Dynamic: Good  Standing - Dynamic: Fair;-        Plan   Plan  Times per week: AT LEAST 5-7  Current Treatment Recommendations: Strengthening, ROM, Balance Training, Functional Mobility Training, Transfer Training, Gait Training, Patient/Caregiver Education & Training, Safety Education & Training  Safety Devices  Type of devices: Bed alarm in place, Call light within reach    G-Code       OutComes Score                                                  AM-PAC Score             Goals  Short term goals  Time Frame for Short term goals: 14 DAYS  Short term goal 1: BED MOB INDEPENDENT  Short term goal 2: TRANSFERS CGA  Short term goal 3: AMB 15' AAD CGA       Therapy Time   Individual Concurrent Group Co-treatment   Time In           Time Out           Minutes                   Ford Guzman, PT

## 2020-10-24 NOTE — ED TRIAGE NOTES
Patient went to see a neurologist in [address] [facility] for the initial visit yesterday. Pt states that they recommended him to go to the ED then. Pt states that they did a physical. Pt states that he is having increasing weakness x2 months. Family states that he has a hard time controlling his arms. Pt sates that he is having episodes of incontinence.

## 2020-10-24 NOTE — H&P
History and Physical    Patient Name:  Khanh Thacker    :  1965    Chief Complaint:   Increasing weakness    History of Present Illness:   Khanh Thacker presents to [facility] with worsening over months weakness of his upper and lower extremities. Patient went to see a neurologist in [place] yesterday who recommended him to go to the ED. Pt sates that he is having episodes of incontinence and worsening difficulty with swallowing food. He states he lost 60 lb in one year. He can only tolerated thicken liquid diet. Patient was still walking with a cane assistance until 2020. Patient is now only ambulatory by wheelchair. Patient states he intermittently has double vision in his left eye. Patient denies  cough, congestion, fever, chills, difficulty breathing, chest pain, abdominal pain, vomiting, diarrhea, diarrhea, constipation, burning with urination, or passing out. PMH:dementia, HTN, BPH, stroke, HL, anxiety     Past Medical History:   has a past medical history of Anxiety, Early onset Alzheimer's dementia ([facility] [address] Hyperlipidemia, Hypertension, Stroke ([facility] [address] and TIA (transient ischemic attack). Surgical History:   has a past surgical history that includes back surgery; Leg Surgery; and Cholecystectomy. Social History:   reports that he has never smoked. He has never used smokeless tobacco. He reports current alcohol use. He reports that he does not use drugs. Family History:  family history is not on file. Medications:  Prior to Admission medications    Medication Sig Start Date End Date Taking?  Authorizing Provider   Multiple Vitamins-Minerals (THERAPEUTIC MULTIVITAMIN-MINERALS) tablet Take 1 tablet by mouth daily   Yes Historical Provider, MD   mirtazapine (REMERON) 15 MG tablet Take 15 mg by mouth nightly   Yes Historical Provider, MD   baclofen (LIORESAL) 10 MG tablet Take 10 mg by mouth 2 times daily   Yes Historical Provider, MD   busPIRone (BUSPAR) 15 MG tablet Take 15 mg by mouth 2 times daily   Yes Historical Provider, MD   megestrol (MEGACE) 40 MG tablet Take 40 mg by mouth daily   Yes Historical Provider, MD   tamsulosin (FLOMAX) 0.4 MG capsule Take 0.4 mg by mouth daily   Yes Historical Provider, MD   topiramate (TOPAMAX) 50 MG tablet Take 25 mg by mouth 2 times daily   Yes Historical Provider, MD   omeprazole (PRILOSEC) 40 MG delayed release capsule Take 40 mg by mouth daily   Yes Historical Provider, MD   primidone (MYSOLINE) 50 MG tablet Take 100 mg by mouth nightly   Yes Historical Provider, MD   donepezil (ARICEPT ODT) 10 MG disintegrating tablet Take 10 mg by mouth nightly   Yes Historical Provider, MD   atorvastatin (LIPITOR) 20 MG tablet Take 20 mg by mouth nightly   Yes Historical Provider, MD   aspirin 81 MG EC tablet Take 81 mg by mouth daily   Yes Historical Provider, MD   atenolol (TENORMIN) 50 MG tablet Take 50 mg by mouth daily   Yes Historical Provider, MD   clopidogrel (PLAVIX) 75 MG tablet Take 75 mg by mouth daily   Yes Historical Provider, MD       Allergies:  Patient has no known allergies. Review of Systems:   · Constitutional: there has been unanticipated weight loss. There's been change in energy level, sleep pattern, or activity level. · Eyes:intermitted double vision. No scleral icterus. · ENT: No Headaches, hearing loss or vertigo. No mouth sores or sore throat. · Cardiovascular: No chest pain, palpitations or loss of consciousness. No pleuritic pain or phlebitis. No orthopnea, PND or peripheral edema. · Respiratory: No cough or wheezing, no sputum production. No hemoptysis. No dyspnea     · Gastrointestinal: No abdominal pain, blood in stools. No change in bowel habits. No N/V. No blood per rectum. · Genitourinary: No dysuria, trouble voiding, or hematuria. · Musculoskeletal:  gait disturbance, weakness   · Integumentary: No rash or pruritis.   · Neurological: No headache, no focal deficts  · Psychiatric: anxiety, depression. · Endocrine: No temperature intolerance. No excessive thirst, fluid intake, or urination. No tremor. · Hematologic/Lymphatic: No abnormal bruising or bleeding, blood clots or swollen lymph nodes. · Allergic/Immunologic: No nasal congestion or hives. Physical Examination:    Vital Signs: /87   Pulse 86   Temp 97.2 °F (36.2 °C) (Temporal)   Resp 17   Ht 6' (1.829 m)   Wt 200 lb (90.7 kg)   SpO2 96%   BMI 27.12 kg/m²   General appearance: Well preserved, mesomorphic body habitus, alert, no distress. Skin: Skin color, texture, turgor normal. No rashes or lesions. No induration or tightening palpated. Head: Normocephalic. No masses, lesions, tenderness or abnormalities  Eyes: conjunctivae/corneas clear. Sclera non icteric. Ears: External ears normal.  Hearing normal to finger rub. Nose/Sinuses: Nares normal. Septum midline. Mucosa normal. No drainage or sinus tenderness. Oropharynx: Lips, mucosa, and tongue normal.   Neck: Neck supple, and symmetric. No adenopathy. Trachea is midline. Carotids brisk in upstroke without bruits, No abnormal JVP noted at 45°. Lungs: Lungs clear to auscultation bilaterally. No retractions or use of accessory muscles. No vocal fremitus. No ronchi, crackle or rale. Heart:  S1 > S2. Regular rate and rhythm. No gallop, murmur, rub, palpable thrill or heave noted. Abdomen: Abdomen soft, non-tender. BS normal. No masses, organomegaly. No hernia noted. Extremities: Extremities deformities of both hands. No cyanosis or clubbing noted to the nails. Peripheral pulses 4/4. Musculoskeletal: Spine ROM abnormal. Muscular strength diminished significantly   Neuro: Motor: Strength 2/5 in all extremities. No focal weakness. Sensory: grossly normal to touch.      Pertinent Labs:  CBC:   Recent Labs     10/23/20  2203   WBC 9.0   RBC 5.15   HGB 16.6   HCT 49.3   MCV 95.7*   MCH 32.2*   MCHC 33.7   RDW 12.7      MPV 11.3     BMP:   Recent Labs 10/23/20  2203      K 4.3      CO2 24   BUN 9   CREATININE 0.8   GLUCOSE 99   CALCIUM 11.7*     ABGs: No results for input(s): PO2, PCO2, PH, HCO3, BE, O2SAT in the last 72 hours. INR: No results for input(s): INR, PROTIME in the last 72 hours. BNP:  No results found for: BNP  TSH: No results found for: TSH  Cardiac Injury Profile:   Lab Results   Component Value Date    TROPONINI <0.01 10/23/2020     Lipid Profile: No components found for: CHLPL  No results found for: TRIG  No results found for: HDL  No results found for: LDLCALC  No results found for: LABVLDL  Hemoglobin A1C: No results found for: LABA1C  No results found for: EAG      Assessment/Plan:  ·  progressive weakness with visual changes, urinary incontinence- ct head neg- neurology consulted- PT/OT  · HTN- home meds  · History of CVA- home meds  · HL- home meds  · Anxiety/depression - home meds  · Dysphagia- speech eval                  I have reviewed my findings and recommendations in detail with Esther Mcdaniel.     Jackelin Álvarez MD   obs HP

## 2020-10-24 NOTE — PROGRESS NOTES
stylus without tremors present.) Do recommend meds whole in pudding/applesauce. If patient receives mouth care prior to intake, okay for ice chips and sips thin H2O IN BETWEEN MEALS for comfort. Will continue to follow to monitor speech/language/cognitive/swallow function. Subjective:  General  Chart Reviewed: Yes  Patient assessed for rehabilitation services?: Yes  Family / Caregiver Present: No    Objective: Auditory Comprehension  Comprehension:  (While delayed, patient demonstrated ability to answer simple yes/no questions regarding immediate environment and current state of being at independent level. While delayed, patient demonstrated ability to follow simple 1 step commands independently. While delayed, patient demonstrated ability to answer yes/no questions of increased complexity and to follow complex 1 and simple 2 step commands at independent level.)    Expression  Primary Mode of Expression:  (Confrontation naming of items in room was considered to be [facility]. Structured responsive speech and responses in natural conversation were considered to be frequently delayed and moderately fragmented where the patient started expressions but then discontinued.)    Motor Speech:  (SLP ranked functional intelligibility of speech for unfamiliar listeners at 100% in verbalizations.)    Overall Orientation Status:  (Patient demonstrated ability to verbalize name, birthday, age, city, state, month, and year at independent level. Patient did not verbalize address, phone number, hospital, month, or date independently.)    Attention:  (Patient presented with decreased select and sustained attention with blank stares inconsistently noted.)    Memory:  (Patient demonstrated decreased immediate memory with sequences of unrelated numbers/words set up to 4 items with repetitions provided.  Patient demonstrated decreased short-term/working memory with less than 5 minute delay+distractions present during assessment.)    Problem Solving:  (While delayed, patient verbalized appropriate solutions to situations that could occur during activities of daily living at independent level. It is noted that during evaluation, patient verbalized PCP and pharmacy at independent level. Patient verbalized conditions in which he takes medications independently. It is also noted that during assessment, patient repeated multiple times-without prompts and not relating to topics and questions-that he has dementia.)    Additional Assessments:  Assessed patient's swallowing function. With puree consistency trials presented by SLP, oral transit varied from 2-3 seconds in length and no oral cavity residue was noted post swallows. With regular solid consistency trials presented by SLP, patient exhibited decreased rotary jaw movement during oral prep. Oral transit of regular solid consistency primarily varied from 2-3 seconds in length and min oral cavity residue was observed post swallows; residue cleared from the mouth with additional dry swallows. Oral transit of nectar thick liquid trials and thin H2O trials, all presented via cup by SLP, primarily measured 1-2 seconds in length. Laryngeal elevation during swallow initiation was considered to be sluggish and inconsistently mildly decreased for swallow airway protection. Even so, no outward S/S penetration/aspiration was noted with any puree consistency trial, regular solid consistency trial, nectar thick liquid trial, or thin H2O trial administered during evaluation this date. At this time, would trial bite sized consistency and nectar thick liquids. Question assistance during meals (with SLP in room and patient aware, patient presented with significant tremors and difficulty bringing food/drinks to self. With SLP in room and patient unaware, he was observed to be navigating phone with stylus without tremors present.) Do recommend meds whole in pudding/applesauce.  If patient receives mouth care prior to intake, okay for ice chips and sips thin H2O IN BETWEEN MEALS for comfort. Will continue to follow to monitor speech/language/cognitive/swallow function.     Electronically signed by AMADA Phoenix on 10/24/2020 at 2:29 PM

## 2020-10-25 ENCOUNTER — APPOINTMENT (OUTPATIENT)
Dept: MRI IMAGING | Age: 55
End: 2020-10-25
Payer: MEDICARE

## 2020-10-25 VITALS
HEIGHT: 72 IN | RESPIRATION RATE: 18 BRPM | HEART RATE: 71 BPM | OXYGEN SATURATION: 97 % | BODY MASS INDEX: 27.09 KG/M2 | WEIGHT: 200 LBS | TEMPERATURE: 98.1 F | SYSTOLIC BLOOD PRESSURE: 108 MMHG | DIASTOLIC BLOOD PRESSURE: 77 MMHG

## 2020-10-25 PROBLEM — F44.4 FUNCTIONAL NEUROLOGICAL SYMPTOM DISORDER WITH WEAKNESS OR PARALYSIS: Status: ACTIVE | Noted: 2020-10-25

## 2020-10-25 PROBLEM — R46.89 AGGRESSIVE BEHAVIOR OF ADULT: Status: ACTIVE | Noted: 2020-10-25

## 2020-10-25 LAB
ANION GAP SERPL CALCULATED.3IONS-SCNC: 11 MMOL/L (ref 7–19)
BUN BLDV-MCNC: 15 MG/DL (ref 6–20)
CALCIUM SERPL-MCNC: 11.2 MG/DL (ref 8.6–10)
CHLORIDE BLD-SCNC: 108 MMOL/L (ref 98–111)
CO2: 22 MMOL/L (ref 22–29)
CREAT SERPL-MCNC: 1.1 MG/DL (ref 0.5–1.2)
GFR AFRICAN AMERICAN: >59
GFR NON-AFRICAN AMERICAN: >60
GLUCOSE BLD-MCNC: 95 MG/DL (ref 74–109)
MAGNESIUM: 2.2 MG/DL (ref 1.6–2.6)
PHOSPHORUS: 3.2 MG/DL (ref 2.5–4.5)
POTASSIUM SERPL-SCNC: 3.9 MMOL/L (ref 3.5–5)
SODIUM BLD-SCNC: 141 MMOL/L (ref 136–145)

## 2020-10-25 PROCEDURE — 6360000002 HC RX W HCPCS: Performed by: HOSPITALIST

## 2020-10-25 PROCEDURE — G0378 HOSPITAL OBSERVATION PER HR: HCPCS

## 2020-10-25 PROCEDURE — 80048 BASIC METABOLIC PNL TOTAL CA: CPT

## 2020-10-25 PROCEDURE — 99225 PR SBSQ OBSERVATION CARE/DAY 25 MINUTES: CPT | Performed by: PSYCHIATRY & NEUROLOGY

## 2020-10-25 PROCEDURE — 84100 ASSAY OF PHOSPHORUS: CPT

## 2020-10-25 PROCEDURE — 6360000002 HC RX W HCPCS

## 2020-10-25 PROCEDURE — 36415 COLL VENOUS BLD VENIPUNCTURE: CPT

## 2020-10-25 PROCEDURE — 2580000003 HC RX 258: Performed by: HOSPITALIST

## 2020-10-25 PROCEDURE — 83735 ASSAY OF MAGNESIUM: CPT

## 2020-10-25 PROCEDURE — 6370000000 HC RX 637 (ALT 250 FOR IP): Performed by: HOSPITALIST

## 2020-10-25 PROCEDURE — 6360000004 HC RX CONTRAST MEDICATION: Performed by: PSYCHIATRY & NEUROLOGY

## 2020-10-25 PROCEDURE — A9577 INJ MULTIHANCE: HCPCS | Performed by: PSYCHIATRY & NEUROLOGY

## 2020-10-25 PROCEDURE — 96374 THER/PROPH/DIAG INJ IV PUSH: CPT

## 2020-10-25 PROCEDURE — 70553 MRI BRAIN STEM W/O & W/DYE: CPT

## 2020-10-25 RX ORDER — LORAZEPAM 2 MG/ML
1 INJECTION INTRAMUSCULAR ONCE
Status: COMPLETED | OUTPATIENT
Start: 2020-10-25 | End: 2020-10-25

## 2020-10-25 RX ORDER — LORAZEPAM 2 MG/ML
INJECTION INTRAMUSCULAR
Status: COMPLETED
Start: 2020-10-25 | End: 2020-10-25

## 2020-10-25 RX ORDER — LORAZEPAM 2 MG/ML
1 INJECTION INTRAMUSCULAR
Status: COMPLETED | OUTPATIENT
Start: 2020-10-25 | End: 2020-10-25

## 2020-10-25 RX ADMIN — ATENOLOL 50 MG: 50 TABLET ORAL at 08:12

## 2020-10-25 RX ADMIN — MULTIPLE VITAMINS W/ MINERALS TAB 1 TABLET: TAB at 08:12

## 2020-10-25 RX ADMIN — BACLOFEN 5 MG: 10 TABLET ORAL at 08:12

## 2020-10-25 RX ADMIN — TAMSULOSIN HYDROCHLORIDE 0.4 MG: 0.4 CAPSULE ORAL at 08:12

## 2020-10-25 RX ADMIN — CLOPIDOGREL BISULFATE 75 MG: 75 TABLET ORAL at 08:12

## 2020-10-25 RX ADMIN — GADOBENATE DIMEGLUMINE 18 ML: 529 INJECTION, SOLUTION INTRAVENOUS at 15:46

## 2020-10-25 RX ADMIN — ASPIRIN 81 MG: 81 TABLET, COATED ORAL at 08:12

## 2020-10-25 RX ADMIN — MEGESTROL ACETATE 40 MG: 40 TABLET ORAL at 08:12

## 2020-10-25 RX ADMIN — PANTOPRAZOLE SODIUM 20 MG: 20 TABLET, DELAYED RELEASE ORAL at 05:54

## 2020-10-25 RX ADMIN — LORAZEPAM 1 MG: 2 INJECTION INTRAMUSCULAR; INTRAVENOUS at 15:20

## 2020-10-25 RX ADMIN — SODIUM CHLORIDE: 9 INJECTION, SOLUTION INTRAVENOUS at 07:41

## 2020-10-25 RX ADMIN — LORAZEPAM 1 MG: 2 INJECTION INTRAMUSCULAR at 15:20

## 2020-10-25 RX ADMIN — LORAZEPAM 1 MG: 2 INJECTION INTRAMUSCULAR; INTRAVENOUS at 14:57

## 2020-10-25 RX ADMIN — BUSPIRONE HYDROCHLORIDE 15 MG: 15 TABLET ORAL at 08:11

## 2020-10-25 RX ADMIN — TOPIRAMATE 25 MG: 50 TABLET, FILM COATED ORAL at 08:12

## 2020-10-25 ASSESSMENT — PAIN SCALES - GENERAL: PAINLEVEL_OUTOF10: 4

## 2020-10-25 NOTE — DISCHARGE SUMMARY
[place], [place], [address]        DEPARTMENT OF HOSPITALIST MEDICINE        DISCHARGE SUMMARY:      PATIENT NAME:  Alexey Sloan  :  1965  MRN:  [id number]    Admission Date:   10/23/2020  8:33 PM Attending: Laurent Mercedes MD   Discharge Date:   10/25/2020              PCP: No primary care provider on file. Length of Stay: 0 days     Chief Complaint on Admission:   Chief Complaint   Patient presents with    Fatigue     pt states his neurologist from [facility] [facility] told him to come. 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 *     Assessment and plan . .. 10/24/2020    ·  progressive weakness with visual changes, urinary incontinence- ct head neg- neurology consulted- PT/OT  · HTN- home meds  · History of CVA- home meds  · HL- home meds  · Anxiety/depression - home meds  · Dysphagia- speech eval        CUMULATIVE  HOSPITAL  COURSE  AND  TREATMENT:    10/25/2020  Patient has multiple neurological complaints going on and he was very disrespectful to our neurologist who believes he might have some other issues going on relating to psychiatric domain. He had an MRI of the brain which he finally done after getting 2 doses of Ativan 1 mg IV each 15 minutes apart to calm him down as he becomes panicky and claustrophobic in the MRI. I reviewed the results of the MRI which are normal.  He is stable and cleared from neurology standpoint to be discharged with instructions to follow-up closely with another neurologist as an outpatient. Neurology also recommending evaluation for malingering/psychosomatic disorder to be evaluated and followed up with psychiatrist/PCP.   He wants to go home, not want any further work-up with neurology or speech evaluation, and his family is at bedside to pick him up after the effect of Ativan wears off.        10/24/2020  History of Present Illness:   Julia Dykes presents to [facility] with worsening over months weakness of his upper and lower extremities. Patient went to see a neurologist in [place] yesterday who recommended him to go to the ED. Pt sates that he is having episodes of incontinence and worsening difficulty with swallowing food. He states he lost 60 lb in one year. He can only tolerated thicken liquid diet. Patient was still walking with a cane assistance until July 2020. Rodger Vogt is now only ambulatory by wheelchair.  Rodger Vogt states he intermittently has double vision in his left eye.  Patient denies  cough, congestion, fever, chills, difficulty breathing, chest pain, abdominal pain, vomiting, diarrhea, diarrhea, constipation, burning with urination, or passing out.     OBJECTIVE:  /70   Pulse 80   Temp 97.6 °F (36.4 °C) (Temporal)   Resp 16   Ht 6' (1.829 m)   Wt 200 lb (90.7 kg)   SpO2 94%   BMI 27.12 kg/m²       Heart: RRR   Lungs: Clear   Abdomen: Soft, non-tender   Extremities: No edema   Neurologic: Cranial nerves II through XII intact   Skin: Warm and dry          Laboratory Data:  Recent Labs     10/23/20  2203   WBC 9.0   HGB 16.6        Recent Labs     10/23/20  2203 10/25/20  0305    141   K 4.3 3.9    108   CO2 24 22   BUN 9 15   CREATININE 0.8 1.1   GLUCOSE 99 95     Recent Labs     10/23/20  2203   AST 24   ALT 30   BILITOT 0.6   ALKPHOS 106     Troponin T:   Recent Labs     10/23/20  2203   TROPONINI <0.01     Pro-BNP: No results for input(s): BNP in the last 72 hours. INR: No results for input(s): INR in the last 72 hours. UA:  Recent Labs     10/24/20  0145   COLORU YELLOW   PHUR 5.0   CLARITYU Clear   SPECGRAV 1.017   LEUKOCYTESUR Negative   UROBILINOGEN 0.2   BILIRUBINUR Negative   BLOODU Negative   GLUCOSEU Negative     A1C: No results for input(s): LABA1C in the last 72 hours.   ABG:No results for input(s): PHART, IGE1BRZ, PO2ART, CLC3RHH, BEART, HGBAE, D9CDXZWP, CARBOXHGBART in the last 72 hours. Impressions of imaging performed in 48 hours before discharge:    Ct Head Wo Contrast    Result Date: 10/23/2020  1. No acute intracranial process. Signed by Dr Sydnee Ambrosio on 10/23/2020 10:36 PM    Xr Chest Portable    Result Date: 10/23/2020  1. No radiographic evidence of acute cardiopulmonary process. Signed by Dr Sydnee Ambrosio on 10/23/2020 10:31 PM    Mri Brain W Wo Contrast    Result Date: 10/25/2020  Impression: Negative MRI brain without and with gadolinium.  Signed by Dr George Hatch on 10/25/2020 4:28 PM        [facility] Medication Instructions [facility]:[id number]    Printed on:10/25/20 1801   Medication Information                      aspirin 81 MG EC tablet  Take 81 mg by mouth daily             atenolol (TENORMIN) 50 MG tablet  Take 50 mg by mouth daily             atorvastatin (LIPITOR) 20 MG tablet  Take 20 mg by mouth nightly             baclofen (LIORESAL) 10 MG tablet  Take 10 mg by mouth 2 times daily             busPIRone (BUSPAR) 15 MG tablet  Take 15 mg by mouth 2 times daily             clopidogrel (PLAVIX) 75 MG tablet  Take 75 mg by mouth daily             donepezil (ARICEPT ODT) 10 MG disintegrating tablet  Take 10 mg by mouth nightly             megestrol (MEGACE) 40 MG tablet  Take 40 mg by mouth daily             mirtazapine (REMERON) 15 MG tablet  Take 15 mg by mouth nightly             Multiple Vitamins-Minerals (THERAPEUTIC MULTIVITAMIN-MINERALS) tablet  Take 1 tablet by mouth daily             omeprazole (PRILOSEC) 40 MG delayed release capsule  Take 40 mg by mouth daily             primidone (MYSOLINE) 50 MG tablet  Take 100 mg by mouth nightly             tamsulosin (FLOMAX) 0.4 MG capsule  Take 0.4 mg by mouth daily             topiramate (TOPAMAX) 50 MG tablet  Take 25 mg by mouth 2 times daily                   ISSUES TO BE ADDRESSED AT HOSPITAL FOLLOW-UP

## 2020-10-25 NOTE — PROGRESS NOTES
Patient:   Jennifer Segovia  MR#:    [id number]   Room:    6564/085-20   YOB: 1965  Date of Progress Note: 10/25/2020  Time of Note                           10:39 AM  Consulting Physician:   Chalino Arthur M.D. Attending Physician:  Jem Torres MD     CHIEF COMPLAINT: ?  Subjective  This 54 y.o. male who presents with a multitude of neurological complaints. Says he has had most of them intermittently for several years but for the past 5 or 6 months they have been more persistent but not necessarily progressive. His old records show that he has had multiple admissions for alcohol abuse with anxiety/depression and suicide attempts. 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 Says he has seen a neurologist in [place] last year but would not tell me the diagnosis that was given him. He saw a neurologist in [address] [place] [address] a few days ago who told him to come to the [facility] emergency department. Says he has not eaten much and has lost 60 to 70 pounds over the last 6 months. Complains of trouble chewing and swallowing at times. Complains of blurry vision in the left eye at times. Complains of trouble ambulating and trouble using his hands. Has chronic pain in his right shoulder. Has some shortness of air at times. Says he is on disability for dementia. No new neurological problems since admission. Refused MRI yesterday. REVIEW OF SYSTEMS:  Constitutional: No fevers No chills  Neck:No stiffness  Respiratory: No shortness of breath  Cardiovascular: No chest pain No palpitations  Gastrointestinal: No abdominal pain    Genitourinary: No Dysuria  Neurological: No headache, no confusion      PHYSICAL EXAM:  /67   Pulse 86   Temp 97.1 °F (36.2 °C) (Temporal)   Resp 18   Ht 6' (1.829 m)   Wt 200 lb (90.7 kg)   SpO2 96%   BMI 27.12 kg/m²     Constitutional: he appears well-developed and well-nourished.    Eyes - conjunctiva normal.  Pupils react to light  Ear, nose, throat -hearing intact to voice. No scars, masses, or lesions over external nose or ears, no atrophy of tongue  Neck-symmetric, no masses noted, no jugular vein distension  Respiration- chest wall appears symmetric, good expansion,   normal effort without use of accessory muscles  Cardiovascular- RRR  Musculoskeletal - no significant wasting of muscles noted, no bony deformities, gait no gross ataxia  Extremities-no clubbing, cyanosis or edema  Skin - warm, dry, and intact. No rash, erythema, or pallor. Psychiatric - mood, affect, and behavior appear normal.      Neurology  NEUROLOGICAL EXAM:      Mental status   Awake, alert, fluent        Cranial Nerves   2 through 12 grossly intact   Motor function  Antigravity x 4. Inconsistently using his hands     Sensory function Intact to light touch     Cerebellar  not tested     Tremor None present     Gait                  Not tested           Nursing/pcp notes, imaging,labs and vitals reviewed. PT,OT and/or speech notes reviewed    Lab Results   Component Value Date    WBC 9.0 10/23/2020    HGB 16.6 10/23/2020    HCT 49.3 10/23/2020    MCV 95.7 (H) 10/23/2020     10/23/2020     Lab Results   Component Value Date     10/25/2020    K 3.9 10/25/2020     10/25/2020    CO2 22 10/25/2020    BUN 15 10/25/2020    CREATININE 1.1 10/25/2020    GLUCOSE 95 10/25/2020    CALCIUM 11.2 (H) 10/25/2020    PROT 6.8 10/23/2020    LABALBU 4.2 10/23/2020    BILITOT 0.6 10/23/2020    ALKPHOS 106 10/23/2020    AST 24 10/23/2020    ALT 30 10/23/2020    LABGLOM >60 10/25/2020    GFRAA >59 10/25/2020   No results found for: INR  Lab Results   Component Value Date    CRP 0.10 10/24/2020             RECORD REVIEW: Previous medical records, medications were reviewed at today's visit    IMPRESSION:   I am not fully convinced that he has dementia nor that he has an underlying neurological problem otherwise.   Some of his symptoms sound somewhat suspicious for myasthenia gravis but he has a great deal of embellishment on examination. I cannot think of one neurological problem that would be causing all of his symptoms. Patient became irate and even belligerent when I walked into the room today. So I will walked out of the room and told him I was going to. After I exited, he told the nurse that if he could get up he would punch me in the face. This was before I had a chance to say anything at all. Suspect somatoform disorder. Possible malingering. No further neurological recommendations currently. Please contact if needed. Recommend discharge today or tomorrow and follow-up with neurology elsewhere. Of note, he had a full neurological work-up for the majority of his problems 1 year ago in [place] although we do not have those records. He says he is having the same problems as then, but they are just worse now. Additionally, I seriously doubt his diagnosis of Alzheimer's disease. Discussed with PCP and nurse.

## 2020-10-25 NOTE — PROGRESS NOTES
[place], [place], [address]        DEPARTMENT OF HOSPITALIST MEDICINE        PLAN  OF  CARE  NOTE:      Patient was admitted earlier today by our nocturnist.  Please see the history and physical for details. I saw and examined the patient at the bedside today. Patient admitted with altered bowel neurological complaints with intermittent and progressive weakness. He is very anxious about his symptoms and wants an answer. Neurology consult has been given. He is admitted in the hospital and will undergo  complete neurological work-up with recommendations. Repeat labs in a.m. Electrolyte replacement as per protocol. Patient will be monitored very closely on the floor. Further recommendations as per the hospital course. Patient  is on DVT prophylaxis  Current medications reviewed  Lab work reviewed  Radiology/Chest x-ray films reviewed  Treatment recommendations from suspecialities reviewed, appreciated and agreed with  Discussed with the nurse and addressed all questions/concerns  Discussed with Patient and/or Family at the bedside in detail . .. they understand and agree with the management plan.         Blanco Bolden MD  10/24/2020, 7:50 PM

## 2020-10-25 NOTE — PROGRESS NOTES
Patient discharged home per order. Patient verbalized understanding of discharge instructions. Patient instructed to call for follow up appointment with PCP and neurologist. Patient stable and leaving floor via wheelchair with nurse.

## 2020-10-26 LAB
EKG P AXIS: 54 DEGREES
EKG P-R INTERVAL: 192 MS
EKG Q-T INTERVAL: 346 MS
EKG QRS DURATION: 98 MS
EKG QTC CALCULATION (BAZETT): 386 MS
EKG T AXIS: 23 DEGREES

## 2020-10-26 PROCEDURE — 93010 ELECTROCARDIOGRAM REPORT: CPT | Performed by: INTERNAL MEDICINE

## 2020-10-29 LAB
ACETYLCHOLINE BINDING ANTIBODY: 0 NMOL/L (ref 0–0.4)
ACETYLCHOLINE BLOCKING AB: 22 % (ref 0–26)

## 2020-10-31 LAB — ACETYLCHOL MODUL AB: 13 %

## 2021-05-05 NOTE — PROGRESS NOTES
Comprehensive Nutrition Assessment    Type and Reason for Visit:  Initial, Positive Nutrition Screen    Nutrition Recommendations/Plan: continue current POC    Nutrition Assessment:  Pt appears adequately nourished as evidenced by fat and muscle mass. Pt asleep at time of visit. Breakfast intake NA. Aware on Megace    Malnutrition Assessment:  Malnutrition Status:  No malnutrition    Context:  Acute Illness     Findings of the 6 clinical characteristics of malnutrition:  Energy Intake:  Mild decrease in energy intake (Comment)  Weight Loss:  7 - Greater than 5% over 1 month     Body Fat Loss:  Unable to assess     Muscle Mass Loss:  Unable to assess    Fluid Accumulation:  No significant fluid accumulation     Strength:  Not Performed    Nutrition Related Findings:  Adequately nourished. Per nursing having difficulty swallowing. tolerating pureed and thickened liquids at home PTA. SLP consult. Wounds:  None       Current Nutrition Therapies:    DIET DYSPHAGIA PUREED; Anthropometric Measures:  · Height: 6' (182.9 cm)  · Current Body Weight: 200 lb (90.7 kg)(estimated)   · Admission Body Weight: 200 lb (90.7 kg)    · Usual Body Weight: 239 lb 1 oz (108.4 kg)(5/2020)     · Ideal Body Weight: 178 lbs; % Ideal Body Weight 112.4 %   · BMI: 27.1  · Adjusted Body Weight:  ; No Adjustment    · BMI Categories: Overweight (BMI 25.0-29. 9)       Nutrition Diagnosis:   · Unintended weight loss related to biting/chewing (masticatory) difficulty, swallowing difficulty, psychological cause or life stress, acute injury/trauma as evidenced by weight loss, weight loss greater than or equal to 5% in 1 month, poor intake prior to admission      Nutrition Interventions:   Food and/or Nutrient Delivery:  Continue Current Diet  Nutrition Education/Counseling:  No recommendation at this time   Coordination of Nutrition Care:  Continued Inpatient Monitoring, Speech Therapy    Goals:  po intake 50% or greater.   Weight stable       Nutrition Monitoring and Evaluation:   Behavioral-Environmental Outcomes:      Food/Nutrient Intake Outcomes:  Food and Nutrient Intake  Physical Signs/Symptoms Outcomes:  Chewing or Swallowing, Skin, Weight     Discharge Planning:     Too soon to determine     Electronically signed by Dora Peñaloza MS, RD, LD on 10/24/20 at 11:08 AM CDT    Contact: [phone] 05-May-2021 09:54

## 2022-08-12 NOTE — NURSING NOTE
Behavior   Anxiety: Sleep disturbance  Depression: depressed mood  Pain   0  AVH   no  S/I   no  H/I   no  Affect   calm and pleasant    Patient speech is clear, dressed appropriately, and makes good eye contact. Patient stated that he's feeling better and may get to go home tomorrow but he didn't sound too enthused about it. Patient stated that he just didn't know what it's going to be like once he's discharged. Patient was concerned about whether he'd be able to get some good sleep tonight so I informed him what medications he was receiving tonight and he had a question about the dosage of one so I instructed him to discuss this matter with the MD in the morning. Left patient               Intervention  Medications reviewed and administered  Assessment complete    Response  Verbalized understanding   Took medications  Interacted with assessment    Plan  Will promote and reinforce current treatment plan and encourage involvement in care plan goals.   Will provide for safe, calm, quiet environment.  Will promote open communication with staff and foster a trusting/working relationship with patient.   Will promote participation in groups and therapies and independent reflection.       Subsequent Stages Histo Method Verbiage: Using a similar technique to that described above, a thin layer of tissue was removed from all areas where tumor was visible on the previous stage.  The tissue was again oriented, mapped, dyed, and processed as above.

## 2023-09-29 ENCOUNTER — HOSPITAL ENCOUNTER (EMERGENCY)
Facility: HOSPITAL | Age: 58
Discharge: HOME | End: 2023-09-30
Attending: EMERGENCY MEDICINE
Payer: MEDICARE

## 2023-09-29 DIAGNOSIS — F10.920 ALCOHOLIC INTOXICATION WITHOUT COMPLICATION: Primary | ICD-10-CM

## 2023-09-29 DIAGNOSIS — S09.93XA FACIAL INJURY, INITIAL ENCOUNTER: ICD-10-CM

## 2023-09-29 PROCEDURE — 99285 EMERGENCY DEPT VISIT HI MDM: CPT

## 2023-09-29 PROCEDURE — 96372 THER/PROPH/DIAG INJ SC/IM: CPT

## 2023-09-30 ENCOUNTER — APPOINTMENT (OUTPATIENT)
Dept: CT IMAGING | Facility: HOSPITAL | Age: 58
End: 2023-09-30
Payer: MEDICARE

## 2023-09-30 VITALS
SYSTOLIC BLOOD PRESSURE: 131 MMHG | RESPIRATION RATE: 18 BRPM | OXYGEN SATURATION: 90 % | TEMPERATURE: 97.4 F | DIASTOLIC BLOOD PRESSURE: 78 MMHG | HEART RATE: 78 BPM

## 2023-09-30 LAB
ALBUMIN SERPL-MCNC: 4 G/DL (ref 3.5–5.2)
ALBUMIN/GLOB SERPL: 1.4 G/DL
ALP SERPL-CCNC: 94 U/L (ref 39–117)
ALT SERPL W P-5'-P-CCNC: 48 U/L (ref 1–41)
AMPHET+METHAMPHET UR QL: NEGATIVE
AMPHETAMINES UR QL: NEGATIVE
ANION GAP SERPL CALCULATED.3IONS-SCNC: 13 MMOL/L (ref 5–15)
AST SERPL-CCNC: 37 U/L (ref 1–40)
BARBITURATES UR QL SCN: NEGATIVE
BASOPHILS # BLD AUTO: 0.03 10*3/MM3 (ref 0–0.2)
BASOPHILS NFR BLD AUTO: 0.3 % (ref 0–1.5)
BENZODIAZ UR QL SCN: POSITIVE
BILIRUB SERPL-MCNC: 0.4 MG/DL (ref 0–1.2)
BILIRUB UR QL STRIP: NEGATIVE
BUN SERPL-MCNC: 11 MG/DL (ref 6–20)
BUN/CREAT SERPL: 10.8 (ref 7–25)
BUPRENORPHINE SERPL-MCNC: NEGATIVE NG/ML
CALCIUM SPEC-SCNC: 10.7 MG/DL (ref 8.6–10.5)
CANNABINOIDS SERPL QL: NEGATIVE
CHLORIDE SERPL-SCNC: 102 MMOL/L (ref 98–107)
CLARITY UR: CLEAR
CO2 SERPL-SCNC: 23 MMOL/L (ref 22–29)
COCAINE UR QL: NEGATIVE
COLOR UR: YELLOW
CREAT SERPL-MCNC: 1.02 MG/DL (ref 0.76–1.27)
DEPRECATED RDW RBC AUTO: 45.4 FL (ref 37–54)
EGFRCR SERPLBLD CKD-EPI 2021: 85.2 ML/MIN/1.73
EOSINOPHIL # BLD AUTO: 0.16 10*3/MM3 (ref 0–0.4)
EOSINOPHIL NFR BLD AUTO: 1.6 % (ref 0.3–6.2)
ERYTHROCYTE [DISTWIDTH] IN BLOOD BY AUTOMATED COUNT: 12.9 % (ref 12.3–15.4)
ETHANOL BLD-MCNC: 270 MG/DL (ref 0–10)
ETHANOL UR QL: 0.27 %
FENTANYL UR-MCNC: NEGATIVE NG/ML
GLOBULIN UR ELPH-MCNC: 2.9 GM/DL
GLUCOSE SERPL-MCNC: 116 MG/DL (ref 65–99)
GLUCOSE UR STRIP-MCNC: NEGATIVE MG/DL
HCT VFR BLD AUTO: 51.4 % (ref 37.5–51)
HGB BLD-MCNC: 17.8 G/DL (ref 13–17.7)
HGB UR QL STRIP.AUTO: NEGATIVE
HOLD SPECIMEN: NORMAL
IMM GRANULOCYTES # BLD AUTO: 0.04 10*3/MM3 (ref 0–0.05)
IMM GRANULOCYTES NFR BLD AUTO: 0.4 % (ref 0–0.5)
KETONES UR QL STRIP: NEGATIVE
LEUKOCYTE ESTERASE UR QL STRIP.AUTO: NEGATIVE
LYMPHOCYTES # BLD AUTO: 5.07 10*3/MM3 (ref 0.7–3.1)
LYMPHOCYTES NFR BLD AUTO: 50.4 % (ref 19.6–45.3)
MCH RBC QN AUTO: 33.2 PG (ref 26.6–33)
MCHC RBC AUTO-ENTMCNC: 34.6 G/DL (ref 31.5–35.7)
MCV RBC AUTO: 95.9 FL (ref 79–97)
METHADONE UR QL SCN: NEGATIVE
MONOCYTES # BLD AUTO: 0.83 10*3/MM3 (ref 0.1–0.9)
MONOCYTES NFR BLD AUTO: 8.3 % (ref 5–12)
NEUTROPHILS NFR BLD AUTO: 3.92 10*3/MM3 (ref 1.7–7)
NEUTROPHILS NFR BLD AUTO: 39 % (ref 42.7–76)
NITRITE UR QL STRIP: NEGATIVE
NRBC BLD AUTO-RTO: 0 /100 WBC (ref 0–0.2)
OPIATES UR QL: NEGATIVE
OXYCODONE UR QL SCN: NEGATIVE
PCP UR QL SCN: NEGATIVE
PH UR STRIP.AUTO: 5.5 [PH] (ref 5–9)
PLATELET # BLD AUTO: 159 10*3/MM3 (ref 140–450)
PMV BLD AUTO: 11.1 FL (ref 6–12)
POTASSIUM SERPL-SCNC: 3.7 MMOL/L (ref 3.5–5.2)
PROT SERPL-MCNC: 6.9 G/DL (ref 6–8.5)
PROT UR QL STRIP: NEGATIVE
RBC # BLD AUTO: 5.36 10*6/MM3 (ref 4.14–5.8)
SODIUM SERPL-SCNC: 138 MMOL/L (ref 136–145)
SP GR UR STRIP: 1.01 (ref 1–1.03)
TRICYCLICS UR QL SCN: NEGATIVE
UROBILINOGEN UR QL STRIP: NORMAL
WBC NRBC COR # BLD: 10.05 10*3/MM3 (ref 3.4–10.8)
WHOLE BLOOD HOLD COAG: NORMAL
WHOLE BLOOD HOLD COAG: NORMAL

## 2023-09-30 PROCEDURE — 72128 CT CHEST SPINE W/O DYE: CPT

## 2023-09-30 PROCEDURE — 82077 ASSAY SPEC XCP UR&BREATH IA: CPT | Performed by: EMERGENCY MEDICINE

## 2023-09-30 PROCEDURE — 25010000002 HALOPERIDOL LACTATE PER 5 MG: Performed by: EMERGENCY MEDICINE

## 2023-09-30 PROCEDURE — 72125 CT NECK SPINE W/O DYE: CPT

## 2023-09-30 PROCEDURE — 25010000002 TETANUS-DIPHTH-ACELL PERTUSSIS 5-2.5-18.5 LF-MCG/0.5 SUSPENSION PREFILLED SYRINGE: Performed by: EMERGENCY MEDICINE

## 2023-09-30 PROCEDURE — 70486 CT MAXILLOFACIAL W/O DYE: CPT

## 2023-09-30 PROCEDURE — 71260 CT THORAX DX C+: CPT

## 2023-09-30 PROCEDURE — 25010000002 DIPHENHYDRAMINE PER 50 MG: Performed by: EMERGENCY MEDICINE

## 2023-09-30 PROCEDURE — 85025 COMPLETE CBC W/AUTO DIFF WBC: CPT | Performed by: EMERGENCY MEDICINE

## 2023-09-30 PROCEDURE — 80053 COMPREHEN METABOLIC PANEL: CPT | Performed by: EMERGENCY MEDICINE

## 2023-09-30 PROCEDURE — 74177 CT ABD & PELVIS W/CONTRAST: CPT

## 2023-09-30 PROCEDURE — 72131 CT LUMBAR SPINE W/O DYE: CPT

## 2023-09-30 PROCEDURE — 70450 CT HEAD/BRAIN W/O DYE: CPT

## 2023-09-30 PROCEDURE — 25510000001 IOPAMIDOL 61 % SOLUTION: Performed by: EMERGENCY MEDICINE

## 2023-09-30 PROCEDURE — 81003 URINALYSIS AUTO W/O SCOPE: CPT | Performed by: EMERGENCY MEDICINE

## 2023-09-30 PROCEDURE — 90715 TDAP VACCINE 7 YRS/> IM: CPT | Performed by: EMERGENCY MEDICINE

## 2023-09-30 PROCEDURE — 80307 DRUG TEST PRSMV CHEM ANLYZR: CPT | Performed by: EMERGENCY MEDICINE

## 2023-09-30 PROCEDURE — 25010000002 LORAZEPAM PER 2 MG: Performed by: EMERGENCY MEDICINE

## 2023-09-30 PROCEDURE — 90471 IMMUNIZATION ADMIN: CPT | Performed by: EMERGENCY MEDICINE

## 2023-09-30 RX ORDER — DIPHENHYDRAMINE HYDROCHLORIDE 50 MG/ML
50 INJECTION INTRAMUSCULAR; INTRAVENOUS ONCE
Status: COMPLETED | OUTPATIENT
Start: 2023-09-30 | End: 2023-09-30

## 2023-09-30 RX ORDER — HALOPERIDOL 5 MG/ML
10 INJECTION INTRAMUSCULAR ONCE
Status: COMPLETED | OUTPATIENT
Start: 2023-09-30 | End: 2023-09-30

## 2023-09-30 RX ORDER — LORAZEPAM 2 MG/ML
2 INJECTION INTRAMUSCULAR ONCE
Status: COMPLETED | OUTPATIENT
Start: 2023-09-30 | End: 2023-09-30

## 2023-09-30 RX ORDER — SODIUM CHLORIDE 0.9 % (FLUSH) 0.9 %
10 SYRINGE (ML) INJECTION AS NEEDED
Status: DISCONTINUED | OUTPATIENT
Start: 2023-09-30 | End: 2023-09-30 | Stop reason: HOSPADM

## 2023-09-30 RX ADMIN — TETANUS TOXOID, REDUCED DIPHTHERIA TOXOID AND ACELLULAR PERTUSSIS VACCINE, ADSORBED 0.5 ML: 5; 2.5; 8; 8; 2.5 SUSPENSION INTRAMUSCULAR at 09:31

## 2023-09-30 RX ADMIN — IOPAMIDOL 90 ML: 612 INJECTION, SOLUTION INTRAVENOUS at 03:23

## 2023-09-30 RX ADMIN — DIPHENHYDRAMINE HYDROCHLORIDE 50 MG: 50 INJECTION INTRAMUSCULAR; INTRAVENOUS at 01:10

## 2023-09-30 RX ADMIN — LORAZEPAM 2 MG: 2 INJECTION INTRAMUSCULAR; INTRAVENOUS at 01:11

## 2023-09-30 RX ADMIN — HALOPERIDOL LACTATE 10 MG: 5 INJECTION, SOLUTION INTRAMUSCULAR at 01:11

## 2023-09-30 NOTE — ED NOTES
Pt offered chair by BURAK Dyer, to prevent falling because pt is sitting and rolling around in room and fraser on rolling stool; pt refused chair; pt got off stool and crawled to bed and got in bed without assistance. Security remains at bedside

## 2023-09-30 NOTE — ED NOTES
Security at bedside because pt is refusing to get back in bed; pt cussing  and throwing chap stick at security.
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
